# Patient Record
Sex: MALE | Race: WHITE | NOT HISPANIC OR LATINO | Employment: OTHER | URBAN - METROPOLITAN AREA
[De-identification: names, ages, dates, MRNs, and addresses within clinical notes are randomized per-mention and may not be internally consistent; named-entity substitution may affect disease eponyms.]

---

## 2017-03-07 ENCOUNTER — GENERIC CONVERSION - ENCOUNTER (OUTPATIENT)
Dept: OTHER | Facility: OTHER | Age: 82
End: 2017-03-07

## 2017-03-07 ENCOUNTER — ALLSCRIPTS OFFICE VISIT (OUTPATIENT)
Dept: OTHER | Facility: OTHER | Age: 82
End: 2017-03-07

## 2017-03-07 DIAGNOSIS — M54.50 LOW BACK PAIN: ICD-10-CM

## 2017-03-08 ENCOUNTER — HOSPITAL ENCOUNTER (OUTPATIENT)
Dept: RADIOLOGY | Facility: MEDICAL CENTER | Age: 82
Discharge: HOME | End: 2017-03-08
Payer: MEDICARE

## 2017-03-08 ENCOUNTER — TRANSCRIBE ORDERS (OUTPATIENT)
Dept: ADMINISTRATIVE | Facility: HOSPITAL | Age: 82
End: 2017-03-08

## 2017-03-08 DIAGNOSIS — M54.50 LOW BACK PAIN: ICD-10-CM

## 2017-03-08 PROCEDURE — 72110 X-RAY EXAM L-2 SPINE 4/>VWS: CPT

## 2017-03-25 ENCOUNTER — ALLSCRIPTS OFFICE VISIT (OUTPATIENT)
Dept: OTHER | Facility: OTHER | Age: 82
End: 2017-03-25

## 2017-03-30 ENCOUNTER — GENERIC CONVERSION - ENCOUNTER (OUTPATIENT)
Dept: OTHER | Facility: OTHER | Age: 82
End: 2017-03-30

## 2017-04-03 ENCOUNTER — GENERIC CONVERSION - ENCOUNTER (OUTPATIENT)
Dept: OTHER | Facility: OTHER | Age: 82
End: 2017-04-03

## 2017-04-11 ENCOUNTER — ALLSCRIPTS OFFICE VISIT (OUTPATIENT)
Dept: OTHER | Facility: OTHER | Age: 82
End: 2017-04-11

## 2017-05-18 ENCOUNTER — LAB REQUISITION (OUTPATIENT)
Dept: LAB | Facility: HOSPITAL | Age: 82
End: 2017-05-18
Payer: MEDICARE

## 2017-05-18 ENCOUNTER — GENERIC CONVERSION - ENCOUNTER (OUTPATIENT)
Dept: OTHER | Facility: OTHER | Age: 82
End: 2017-05-18

## 2017-05-18 ENCOUNTER — ALLSCRIPTS OFFICE VISIT (OUTPATIENT)
Dept: OTHER | Facility: OTHER | Age: 82
End: 2017-05-18

## 2017-05-18 DIAGNOSIS — C18.6 MALIGNANT NEOPLASM OF DESCENDING COLON (HCC): ICD-10-CM

## 2017-05-18 DIAGNOSIS — I10 ESSENTIAL (PRIMARY) HYPERTENSION: ICD-10-CM

## 2017-05-18 DIAGNOSIS — Z00.00 ENCOUNTER FOR GENERAL ADULT MEDICAL EXAMINATION WITHOUT ABNORMAL FINDINGS: ICD-10-CM

## 2017-05-18 DIAGNOSIS — R42 DIZZINESS AND GIDDINESS: ICD-10-CM

## 2017-05-18 LAB
ALBUMIN SERPL BCP-MCNC: 3.8 G/DL (ref 3.5–5)
ALP SERPL-CCNC: 40 U/L (ref 46–116)
ALT SERPL W P-5'-P-CCNC: 71 U/L (ref 12–78)
ANION GAP SERPL CALCULATED.3IONS-SCNC: 7 MMOL/L (ref 4–13)
ANISOCYTOSIS BLD QL SMEAR: PRESENT
AST SERPL W P-5'-P-CCNC: 39 U/L (ref 5–45)
BASOPHILS # BLD MANUAL: 0.11 THOUSAND/UL (ref 0–0.1)
BASOPHILS NFR MAR MANUAL: 2 % (ref 0–1)
BILIRUB SERPL-MCNC: 0.7 MG/DL (ref 0.2–1)
BUN SERPL-MCNC: 15 MG/DL (ref 5–25)
CALCIUM SERPL-MCNC: 8.6 MG/DL (ref 8.3–10.1)
CHLORIDE SERPL-SCNC: 103 MMOL/L (ref 100–108)
CO2 SERPL-SCNC: 27 MMOL/L (ref 21–32)
CREAT SERPL-MCNC: 1.01 MG/DL (ref 0.6–1.3)
EOSINOPHIL # BLD MANUAL: 0.05 THOUSAND/UL (ref 0–0.4)
EOSINOPHIL NFR BLD MANUAL: 1 % (ref 0–6)
ERYTHROCYTE [DISTWIDTH] IN BLOOD BY AUTOMATED COUNT: 13.2 % (ref 11.6–15.1)
GFR SERPL CREATININE-BSD FRML MDRD: >60 ML/MIN/1.73SQ M
GLUCOSE P FAST SERPL-MCNC: 208 MG/DL (ref 65–99)
HCT VFR BLD AUTO: 46.1 % (ref 36.5–49.3)
HGB BLD-MCNC: 15.9 G/DL (ref 12–17)
LYMPHOCYTES # BLD AUTO: 1.34 THOUSAND/UL (ref 0.6–4.47)
LYMPHOCYTES # BLD AUTO: 25 % (ref 14–44)
MCH RBC QN AUTO: 31.9 PG (ref 26.8–34.3)
MCHC RBC AUTO-ENTMCNC: 34.5 G/DL (ref 31.4–37.4)
MCV RBC AUTO: 92 FL (ref 82–98)
MONOCYTES # BLD AUTO: 0.64 THOUSAND/UL (ref 0–1.22)
MONOCYTES NFR BLD: 12 % (ref 4–12)
NEUTROPHILS # BLD MANUAL: 3.22 THOUSAND/UL (ref 1.85–7.62)
NEUTS SEG NFR BLD AUTO: 60 % (ref 43–75)
NRBC BLD AUTO-RTO: 0 /100 WBCS
PLATELET # BLD AUTO: 146 THOUSANDS/UL (ref 149–390)
PLATELET BLD QL SMEAR: ABNORMAL
PMV BLD AUTO: 12 FL (ref 8.9–12.7)
POTASSIUM SERPL-SCNC: 4.3 MMOL/L (ref 3.5–5.3)
PROT SERPL-MCNC: 7.1 G/DL (ref 6.4–8.2)
RBC # BLD AUTO: 4.99 MILLION/UL (ref 3.88–5.62)
RBC MORPH BLD: PRESENT
SODIUM SERPL-SCNC: 137 MMOL/L (ref 136–145)
TSH SERPL DL<=0.05 MIU/L-ACNC: 1.73 UIU/ML (ref 0.36–3.74)
WBC # BLD AUTO: 5.37 THOUSAND/UL (ref 4.31–10.16)

## 2017-05-18 PROCEDURE — 85027 COMPLETE CBC AUTOMATED: CPT | Performed by: FAMILY MEDICINE

## 2017-05-18 PROCEDURE — 80053 COMPREHEN METABOLIC PANEL: CPT | Performed by: FAMILY MEDICINE

## 2017-05-18 PROCEDURE — 85007 BL SMEAR W/DIFF WBC COUNT: CPT | Performed by: FAMILY MEDICINE

## 2017-05-18 PROCEDURE — 84443 ASSAY THYROID STIM HORMONE: CPT | Performed by: FAMILY MEDICINE

## 2017-06-29 ENCOUNTER — GENERIC CONVERSION - ENCOUNTER (OUTPATIENT)
Dept: OTHER | Facility: OTHER | Age: 82
End: 2017-06-29

## 2017-08-09 ENCOUNTER — ALLSCRIPTS OFFICE VISIT (OUTPATIENT)
Dept: OTHER | Facility: OTHER | Age: 82
End: 2017-08-09

## 2017-11-15 ENCOUNTER — ALLSCRIPTS OFFICE VISIT (OUTPATIENT)
Dept: OTHER | Facility: OTHER | Age: 82
End: 2017-11-15

## 2017-11-19 NOTE — PROGRESS NOTES
Assessment    1  Acute sinusitis, recurrence not specified, unspecified location (461 9) (J01 90)    Plan  Acute sinusitis, recurrence not specified, unspecified location    · Cefuroxime Axetil 500 MG Oral Tablet; TAKE 1 TABLET 2 TIMES DAILY AFTERMEALS    Discussion/Summary    Discussed OTC cold meds  Care, ER instructions given  F/U 5-7 days if not resolved  Possible side effects of new medications were reviewed with the patient/guardian today  The treatment plan was reviewed with the patient/guardian  The patient/guardian understands and agrees with the treatment plan      Chief Complaint    1  Cold Symptoms  Pt presents with sinus, congestion and blocked ears  History of Present Illness  HPI: PT  presents with 2-3 day history of runny nose, swollen glands, ears popping, nasal congestion; denies cough, ST, fever, chills, N/V/D  He has taken a leftover dose of Ceftin that was prescribed 5 years ago  Denies hx of asthma/allergies  Does not smoke  He has gotten a flu shot at [employer]  Review of Systems   Constitutional: no fever or chills, feels well, no tiredness, no recent weight loss or weight gain  ENT: as noted in HPI  Cardiovascular: no complaints of slow or fast heart rate, no chest pain, no palpitations, no leg claudication or lower extremity edema  Respiratory: as noted in HPI  Gastrointestinal: no complaints of abdominal pain, no constipation, no nausea or vomiting, no diarrhea or bloody stools  Genitourinary: no complaints of dysuria or incontinence, no hesitancy, no nocturia, no genital lesion, no inadequacy of penile erection  Active Problems  1  Acute sinusitis, recurrence not specified, unspecified location (461 9) (J01 90)   2  Benign essential hypertension (401 1) (I10)   3  Carcinoma of left colon (153 2) (C18 6)   4  Cataract of right eye (366 9) (H26 9)   5  Chronic low back pain (724 2,338 29) (M54 5,G89 29)   6  Dizziness (780 4) (R42)   7  Glaucoma (365 9) (H40 9)   8  Positive depression screening (796 4) (Z13 89)   9  Prediabetes (790 29) (R73 09)    Past Medical History  1  History of Acute lymphadenitis of upper limb (683) (L04 2)   2  Acute upper respiratory infection (465 9) (J06 9)   3  Acute upper respiratory infection (465 9) (J06 9)   4  Acute upper respiratory infection (465 9) (J06 9)   5  History of Allergic rhinitis due to pollen (477 0) (J30 1)   6  History of Asthma without status asthmaticus (493 90) (J45 909)   7  History of Diverticulosis (562 10) (K57 90)   8  History of acute bacterial sinusitis (V12 69) (Z87 09)   9  History of acute bacterial sinusitis (V12 69) (Z87 09)   10  History of acute pharyngitis (V12 69) (Z87 09)   11  History of acute sinusitis (V12 69) (Z87 09)   12  History of acute sinusitis (V12 69) (Z87 09)   13  History of allergic rhinitis (V12 69) (Z87 09)   14  History of colon cancer (V10 05) (Z85 038)   15  History of fatigue (V13 89) (Z87 898)   16  History of Inguinal hernia (550 90) (K40 90)   17  History of Nocturia (788 43) (R35 1)   18  History of Plantar fasciitis (728 71) (M72 2)   19  History of Preoperative examination (V72 84) (Z01 818)   20  History of Shoulder pain, right (719 41) (M25 511)   21  History of Skin lesion (709 9) (L98 9)   22  History of Skin lesion (709 9) (L98 9)   23  History of Special screening examination for neoplasm of prostate (V76 44) (Z12 5)   24  History of Strain of neck muscle, initial encounter (847 0) (S16 1XXA)   25  History of Wound, open, arm, forearm (881 00) (S51 809A)    Family History  Mother    1  Family history of    2  Family history of Diabetes (250 00) (E11 9)   3  Family history of Heart disease (429 9) (I51 9)  Father    4  Family history of     Social History   · Denied: History of Alcohol use   · Caffeine use (V49 89) (F15 90)   · Former smoker (V15 82) (W64 379)   · No drug use  The social history was reviewed and is unchanged  Surgical History    1   History of Cataract Surgery   2  History of Left Hemicolectomy   3  History of Shoulder Surgery    Current Meds   1  Wilmer Contour Test In [employer]; USE 1 STRIP Daily; Therapy: 47DJX6149 to (Last Yeseniaxiomara Lugo)  Requested for: 21Oct2016 Ordered   2  Combigan 0 2-0 5 % Ophthalmic Solution; Therapy: 45EVW8996 to ()  Requested for: 22QED4206 Recorded   3  Fluticasone Propionate 50 MCG/ACT Nasal Suspension; USE 2 SPRAYS IN EACH NOSTRIL ONCE DAILY; Therapy: 85LJV8443 to (Last Rx:79Ylj1648)  Requested for: 51Lci1814 Ordered   4  Lisinopril-Hydrochlorothiazide 10-12 5 MG Oral Tablet; TAKE 1 TABLET DAILY; Therapy: 88DRI8822 to (Evaluate:44Rjd4274)  Requested for: 30IZV0608; Last Rx:42Grd8551 Ordered   5  Lumigan 0 01 % Ophthalmic Solution; 1 drop both eyes bid Recorded   6  Simbrinza 1-0 2 % Ophthalmic Suspension; Therapy: 41CHT5084 to Recorded    The medication list was reviewed and updated today  Allergies  1  No Known Drug Allergies    Vitals   Recorded: 45KWC0180 09:33AM Recorded: 90OEA7040 09:08AM   Temperature  97 6 F, Tympanic   Heart Rate  97   Respiration  16   Systolic 606 946   Diastolic 78 82   Height  5 ft 6 in   Weight  181 lb    BMI Calculated  29 21   BSA Calculated  1 92   O2 Saturation  99   Pain Scale  0       Physical Exam   Constitutional  General appearance: No acute distress, well appearing and well nourished  Ears, Nose, Mouth, and Throat  External inspection of ears and nose: Normal    Otoscopic examination: Abnormal  -- Bilateral dull TMs  Nasal mucosa, septum, and turbinates: Abnormal  -- B/L boggy turbinates  Oropharynx: Abnormal  -- PND and erythema; no exudates  Pulmonary  Respiratory effort: No increased work of breathing or signs of respiratory distress  Auscultation of lungs: Clear to auscultation, equal breath sounds bilaterally, no wheezes, no rales, no rhonci  Cardiovascular  Auscultation of heart: Normal rate and rhythm, normal S1 and S2, without murmurs  Lymphatic  Palpation of lymph nodes in neck: No lymphadenopathy  Psychiatric  Orientation to person, place and time: Normal    Mood and affect: Normal    Additional Exam:  Vital signs were reviewed; neck supple  Future Appointments    Date/Time Provider Specialty Site   12/01/2017 10:00 AM CAITY Clark  Hematology Oncology [facility]       Signatures   Electronically signed by :  Priya Jean, [facility]; Nov 15 2017 10:27AM EST                       (Author)    Electronically signed by : Osito Katz DO; Nov 17 2017 10:18PM EST                       (Co-author)

## 2017-11-30 ENCOUNTER — GENERIC CONVERSION - ENCOUNTER (OUTPATIENT)
Dept: OTHER | Facility: OTHER | Age: 82
End: 2017-11-30

## 2017-12-01 ENCOUNTER — GENERIC CONVERSION - ENCOUNTER (OUTPATIENT)
Dept: OTHER | Facility: OTHER | Age: 82
End: 2017-12-01

## 2017-12-04 ENCOUNTER — GENERIC CONVERSION - ENCOUNTER (OUTPATIENT)
Dept: OTHER | Facility: OTHER | Age: 82
End: 2017-12-04

## 2018-01-13 VITALS
DIASTOLIC BLOOD PRESSURE: 92 MMHG | TEMPERATURE: 95.8 F | RESPIRATION RATE: 17 BRPM | WEIGHT: 181.56 LBS | OXYGEN SATURATION: 96 % | HEIGHT: 66 IN | SYSTOLIC BLOOD PRESSURE: 162 MMHG | BODY MASS INDEX: 29.18 KG/M2 | HEART RATE: 80 BPM

## 2018-01-13 VITALS
BODY MASS INDEX: 28.96 KG/M2 | RESPIRATION RATE: 17 BRPM | HEART RATE: 114 BPM | DIASTOLIC BLOOD PRESSURE: 80 MMHG | SYSTOLIC BLOOD PRESSURE: 142 MMHG | WEIGHT: 180.19 LBS | TEMPERATURE: 98.9 F | OXYGEN SATURATION: 96 % | HEIGHT: 66 IN

## 2018-01-14 VITALS
TEMPERATURE: 97.4 F | OXYGEN SATURATION: 98 % | SYSTOLIC BLOOD PRESSURE: 132 MMHG | HEART RATE: 112 BPM | RESPIRATION RATE: 17 BRPM | DIASTOLIC BLOOD PRESSURE: 86 MMHG | HEIGHT: 66 IN | BODY MASS INDEX: 29.1 KG/M2 | WEIGHT: 181.06 LBS

## 2018-01-14 VITALS
SYSTOLIC BLOOD PRESSURE: 158 MMHG | DIASTOLIC BLOOD PRESSURE: 88 MMHG | HEART RATE: 89 BPM | RESPIRATION RATE: 17 BRPM | OXYGEN SATURATION: 95 % | WEIGHT: 176 LBS | HEIGHT: 66 IN | BODY MASS INDEX: 28.28 KG/M2 | TEMPERATURE: 96.8 F

## 2018-01-14 VITALS
WEIGHT: 181 LBS | HEART RATE: 97 BPM | RESPIRATION RATE: 16 BRPM | HEIGHT: 66 IN | DIASTOLIC BLOOD PRESSURE: 78 MMHG | SYSTOLIC BLOOD PRESSURE: 122 MMHG | BODY MASS INDEX: 29.09 KG/M2 | TEMPERATURE: 97.6 F | OXYGEN SATURATION: 99 %

## 2018-01-14 NOTE — RESULT NOTES
Verified Results  * XR SPINE LUMBAR MINIMUM 4 VIEWS NON INJURY 17QSV2861 08:27AM Shirley Layne Order Number: [id number]     Test Name Result Flag Reference   XR SPINE LUMBAR MINIMUM 4 VIEWS (Report)     LUMBAR SPINE     INDICATION: Intermittent back pain for several years  Low back pain  COMPARISON: None     VIEWS: AP, lateral, bilateral oblique and coned down projections     IMAGES: 5     FINDINGS:     Osseous structures demineralized  Bilateral pars interarticularis defects L5  Grade 3 anterolisthesis L5 on S1  Loss in the axial heights of the T12 and L1 vertebral bodies along their superior endplates, compatible with Schmorl's node formation or compression fractures of unknown age  Old healed fractures of the left 9th and 10th ribs  Disc space narrowing throughout the lumbar spine, most pronounced L5-S1  Diffuse facet hypertrophic changes throughout the lumbar spine, most pronounced L4-L5 and L5-S1  Mild scoliotic curvature, concave left  Visualized soft tissues appear unremarkable  IMPRESSION:   1  Advanced degenerative changes lumbar spine, most pronounced L5-S1    2  Bilateral pars defects L5 with grade 3 anterolisthesis L5 on S1    3  Loss in the axial height of the T12 and L1 vertebral bodies along their superior endplates, representing either degenerative change/Schmorl's node formation versus age-indeterminate compression fracture  4  Old healed fractures of the left 9th and 10th ribs         ##sigslh##sigslh       Workstation performed: [id number]     Signed by:   Azra Vera DO   3/9/17

## 2018-01-15 VITALS
WEIGHT: 183.56 LBS | TEMPERATURE: 96.9 F | DIASTOLIC BLOOD PRESSURE: 90 MMHG | HEIGHT: 66 IN | SYSTOLIC BLOOD PRESSURE: 138 MMHG | RESPIRATION RATE: 17 BRPM | BODY MASS INDEX: 29.5 KG/M2 | OXYGEN SATURATION: 97 % | HEART RATE: 81 BPM

## 2018-01-15 NOTE — RESULT NOTES
Verified Results  (1) CBC/PLT/DIFF 57LKQ9260 11:17AM [facility] Order Number: [id number]     Test Name Result Flag Reference   WBC COUNT 5 37 Thousand/uL  4 31-10 16   RBC COUNT 4 99 Million/uL  3 88-5 62   HEMOGLOBIN 15 9 g/dL  12 0-17 0   HEMATOCRIT 46 1 %  36 5-49 3   MCV 92 fL  82-98   MCH 31 9 pg  26 8-34 3   MCHC 34 5 g/dL  31 4-37 4   RDW 13 2 %  11 6-15 1   MPV 12 0 fL  8 9-12 7   PLATELET COUNT 801 Thousands/uL L 149-390   nRBC AUTOMATED 0 /100 WBCs     This is an appended report  These results have been appended to a previously verified report  NEUTROPHILS - REL 60 %  43-75   LYMPHOCYTES - REL 25 %  14-44   MONOCYTES - REL 12 %  4-12   EOSINOPHILS - REL 1 %  0-6   BASOPHILS - REL 2 % H 0-1   NEUTROPHILS ABS 3 22 Thousand/uL  1 85-7 62   LYMPHOTCYTES ABS 1 34 Thousand/uL  0 60-4 47   MONOCYTES ABS 0 64 Thousand/uL  0 00-1 22   EOSINOPHILS ABS 0 05 Thousand/uL  0 00-0 40   BASOPHILS ABS 0 11 Thousand/uL H 0 00-0 10   TOTAL COUNTED      RBC MORPHOLOGY Present     Anisocytosis Present     PLT ESTIMATE Decreased A Adequate     (1) COMPREHENSIVE METABOLIC PANEL 65FHW7159 34:53ND [facility] Order Number: [id number]     Test Name Result Flag Reference   SODIUM 137 mmol/L  136-145   POTASSIUM 4 3 mmol/L  3 5-5 3   CHLORIDE 103 mmol/L  100-108   CARBON DIOXIDE 27 mmol/L  21-32   ANION GAP (CALC) 7 mmol/L  4-13   BLOOD UREA NITROGEN 15 mg/dL  5-25   CREATININE 1 01 mg/dL  0 60-1 30   Standardized to IDMS reference method   CALCIUM 8 6 mg/dL  8 3-10 1   BILI, TOTAL 0 70 mg/dL  0 20-1 00   ALK PHOSPHATAS 40 U/L L    ALT (SGPT) 71 U/L  12-78   AST(SGOT) 39 U/L  5-45   ALBUMIN 3 8 g/dL  3 5-5 0   TOTAL PROTEIN 7 1 g/dL  6 4-8 2   eGFR Non-African American      >60 0 ml/min/1 73sq m   [employer] Disease Education Program recommendations are as follows:  GFR calculation is accurate only with a steady state creatinine  Chronic Kidney disease less than 60 ml/min/1 73 sq  meters  Kidney failure less than 15 ml/min/1 73 sq  meters  GLUCOSE FASTING 208 mg/dL H 65-99     (1) TSH WITH FT4 REFLEX 23WYB6702 11:17AM Zhanna Berger Order Number: [id number]     Test Name Result Flag Reference   TSH 1 730 uIU/mL  0 358-3 740   Patients undergoing fluorescein dye angiography may retain small amounts of fluorescein in the body for 48-72 hours post procedure  Samples containing fluorescein can produce falsely depressed TSH values  If the patient had this procedure,a specimen should be resubmitted post fluorescein clearance

## 2018-01-24 VITALS
DIASTOLIC BLOOD PRESSURE: 104 MMHG | BODY MASS INDEX: 29.47 KG/M2 | RESPIRATION RATE: 16 BRPM | HEIGHT: 66 IN | WEIGHT: 183.38 LBS | HEART RATE: 95 BPM | OXYGEN SATURATION: 95 % | TEMPERATURE: 97.4 F | SYSTOLIC BLOOD PRESSURE: 172 MMHG

## 2018-01-24 VITALS
RESPIRATION RATE: 16 BRPM | SYSTOLIC BLOOD PRESSURE: 122 MMHG | TEMPERATURE: 99.4 F | DIASTOLIC BLOOD PRESSURE: 74 MMHG | HEART RATE: 113 BPM | BODY MASS INDEX: 29.41 KG/M2 | OXYGEN SATURATION: 95 % | WEIGHT: 183 LBS | HEIGHT: 66 IN

## 2018-03-05 ENCOUNTER — TELEPHONE (OUTPATIENT)
Dept: FAMILY MEDICINE CLINIC | Facility: CLINIC | Age: 83
End: 2018-03-05

## 2018-03-05 DIAGNOSIS — R73.03 PREDIABETES: Primary | ICD-10-CM

## 2018-03-05 PROBLEM — R97.0 ELEVATED CEA: Status: ACTIVE | Noted: 2017-12-01

## 2018-03-05 PROBLEM — G89.29 CHRONIC LOW BACK PAIN: Status: ACTIVE | Noted: 2017-03-07

## 2018-03-05 PROBLEM — R74.01 ELEVATED TRANSAMINASE LEVEL: Status: ACTIVE | Noted: 2017-12-01

## 2018-03-05 PROBLEM — Z13.31 POSITIVE DEPRESSION SCREENING: Status: ACTIVE | Noted: 2017-08-09

## 2018-03-05 PROBLEM — R42 DIZZINESS: Status: ACTIVE | Noted: 2017-05-18

## 2018-03-05 PROBLEM — M54.50 CHRONIC LOW BACK PAIN: Status: ACTIVE | Noted: 2017-03-07

## 2018-03-05 RX ORDER — LANCETS 30 GAUGE
EACH MISCELLANEOUS
Qty: 100 EACH | Refills: 3 | Status: SHIPPED | OUTPATIENT
Start: 2018-03-05

## 2018-03-05 NOTE — TELEPHONE ENCOUNTER
Patient came to office requesting lancet to test blood sugar, please sent to  [address] [place] [address] in [address] [place] [address], thank you

## 2018-03-13 DIAGNOSIS — R73.09 ABNORMAL GLUCOSE: Primary | ICD-10-CM

## 2018-05-28 DIAGNOSIS — R74.02 NONSPECIFIC ELEVATION OF LEVELS OF TRANSAMINASE AND LACTIC ACID DEHYDROGENASE (LDH): ICD-10-CM

## 2018-05-28 DIAGNOSIS — C18.6 MALIGNANT NEOPLASM OF DESCENDING COLON (HCC): ICD-10-CM

## 2018-05-28 DIAGNOSIS — R74.01 NONSPECIFIC ELEVATION OF LEVELS OF TRANSAMINASE AND LACTIC ACID DEHYDROGENASE (LDH): ICD-10-CM

## 2018-05-28 DIAGNOSIS — R97.0 ELEVATED CARCINOEMBRYONIC ANTIGEN: ICD-10-CM

## 2018-08-10 ENCOUNTER — LAB REQUISITION (OUTPATIENT)
Dept: LAB | Facility: HOSPITAL | Age: 83
End: 2018-08-10
Payer: MEDICARE

## 2018-08-10 DIAGNOSIS — D49.2 NEOPLASM OF UNSPECIFIED BEHAVIOR OF BONE, SOFT TISSUE, AND SKIN: ICD-10-CM

## 2018-08-10 PROCEDURE — 88305 TISSUE EXAM BY PATHOLOGIST: CPT | Performed by: PATHOLOGY

## 2018-08-21 PROCEDURE — 88305 TISSUE EXAM BY PATHOLOGIST: CPT | Performed by: PATHOLOGY

## 2018-08-22 ENCOUNTER — LAB REQUISITION (OUTPATIENT)
Dept: LAB | Facility: HOSPITAL | Age: 83
End: 2018-08-22
Payer: MEDICARE

## 2018-08-22 DIAGNOSIS — D49.2 NEOPLASM OF UNSPECIFIED BEHAVIOR OF BONE, SOFT TISSUE, AND SKIN: ICD-10-CM

## 2018-08-31 PROCEDURE — 88305 TISSUE EXAM BY PATHOLOGIST: CPT | Performed by: PATHOLOGY

## 2018-09-04 ENCOUNTER — LAB REQUISITION (OUTPATIENT)
Dept: LAB | Facility: HOSPITAL | Age: 83
End: 2018-09-04
Payer: MEDICARE

## 2018-09-04 DIAGNOSIS — D49.2 NEOPLASM OF UNSPECIFIED BEHAVIOR OF BONE, SOFT TISSUE, AND SKIN: ICD-10-CM

## 2018-11-26 DIAGNOSIS — R74.02 NONSPECIFIC ELEVATION OF LEVELS OF TRANSAMINASE AND LACTIC ACID DEHYDROGENASE (LDH): ICD-10-CM

## 2018-11-26 DIAGNOSIS — R97.0 ELEVATED CARCINOEMBRYONIC ANTIGEN: ICD-10-CM

## 2018-11-26 DIAGNOSIS — R74.01 NONSPECIFIC ELEVATION OF LEVELS OF TRANSAMINASE AND LACTIC ACID DEHYDROGENASE (LDH): ICD-10-CM

## 2018-11-26 DIAGNOSIS — C18.6 MALIGNANT NEOPLASM OF DESCENDING COLON (HCC): ICD-10-CM

## 2018-11-28 DIAGNOSIS — C18.6 CARCINOMA OF LEFT COLON (HCC): Primary | ICD-10-CM

## 2018-12-03 DIAGNOSIS — C18.6 CARCINOMA OF LEFT COLON (HCC): Primary | ICD-10-CM

## 2018-12-14 ENCOUNTER — OFFICE VISIT (OUTPATIENT)
Dept: HEMATOLOGY ONCOLOGY | Facility: CLINIC | Age: 83
End: 2018-12-14
Payer: MEDICARE

## 2018-12-14 VITALS
SYSTOLIC BLOOD PRESSURE: 130 MMHG | TEMPERATURE: 96.7 F | WEIGHT: 176 LBS | RESPIRATION RATE: 16 BRPM | DIASTOLIC BLOOD PRESSURE: 80 MMHG | BODY MASS INDEX: 28.28 KG/M2 | HEIGHT: 66 IN | OXYGEN SATURATION: 97 % | HEART RATE: 103 BPM

## 2018-12-14 DIAGNOSIS — C18.6 CARCINOMA OF LEFT COLON (HCC): Primary | ICD-10-CM

## 2018-12-14 PROCEDURE — 99214 OFFICE O/P EST MOD 30 MIN: CPT | Performed by: INTERNAL MEDICINE

## 2018-12-14 RX ORDER — BRIMONIDINE TARTRATE, TIMOLOL MALEATE 2; 5 MG/ML; MG/ML
SOLUTION/ DROPS OPHTHALMIC
COMMUNITY
Start: 2010-10-15 | End: 2020-05-13 | Stop reason: ALTCHOICE

## 2018-12-14 NOTE — PATIENT INSTRUCTIONS
The patient is aware to seek medical attention for abdominal pain, change in bowel habits, excessive fatigue, or if other new problems arise

## 2018-12-14 NOTE — PROGRESS NOTES
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, status post left hemicolectomy, T2N1 stage C1, eight courses of Xeloda from February 2006 to July 2006  Colonoscopy on April 15, 2015: There is a small polyp 23 cm below the anastomosis removed by forceps, three-year follow-up is recommended  Review of systems:      Constitutional:  He has been feeling well, he denies chills or sweats  HEENT:  He denies nose bleeds  He denies oral cavity or throat soreness  Cardiovascular:  He denies chest pain, there has been no edema  Respiratory:  He denies cough or dyspnea  GI:  His appetite has been good  No abdominal pain  Bowel habits have been formed and regular  :  He denies urinary frequency  Musculoskeletal:  He denies joint pain or back pain  Skin:  He denies rash  Neurologic:  He denies headache or paresthesias  He denies difficulty walking  Psychiatric:  He denies emotional problems  Hematologic:  He denies easy bruising  Physical Examination:    Blood pressure 130/80, pulse 103, temperature (!) 96 7 °F (35 9 °C), resp  rate 16, height 5' 6" (1 676 m), weight 79 8 kg (176 lb), SpO2 97 %  Body surface area is 1 89 meters squared  He appears well  EOMI  No hearing deficit  Orophaynx clear  No lymphadenopathy of the neck  No axillary lymphadenopathy   Lungs are clear bilaterally  Heart has regular rate and rhythm  No hepatic or splenic enlargement  No inguinal lymphadenopathy  No lower extremity edema  No ecchymoses    There are multiple keratoses and hemangiomata of the trunk and upper extremities and to lesser extent the lower extremities, none with suspicious appearance  Normal gait and station  Neurological is grossly intact  Oriented x3, normal mood and affect  ECOG 0     Laboratory:    From December 3, 2018:  WBC 6 0, hemoglobin 14 7, platelets 214, creatinine 1 17, alk-phos 45, bili 0 5, AST 31, ALT 48, CEA 7 0 (<7 0)  Contrast enhanced CT of the chest, abdomen pelvis from December 6, 2017:  The 2 mm YANG nodule is unchanged compared to November 27, 2015, no hilar mediastinal lymphadenopathy, there is a < 1 cm left hepatic lobe lesion likely a small cyst, a 2 2 x 1 7 left adrenal likely an adrenal myelolipoma, no abdominal pelvic lymphadenopathy, a 3 2 x 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

## 2019-01-11 DIAGNOSIS — E11.9 TYPE 2 DIABETES MELLITUS WITHOUT COMPLICATION, WITHOUT LONG-TERM CURRENT USE OF INSULIN (HCC): Primary | ICD-10-CM

## 2019-01-14 ENCOUNTER — TELEPHONE (OUTPATIENT)
Dept: FAMILY MEDICINE CLINIC | Facility: CLINIC | Age: 84
End: 2019-01-14

## 2019-01-14 RX ORDER — FLUTICASONE PROPIONATE 50 MCG
2 SPRAY, SUSPENSION (ML) NASAL DAILY
COMMUNITY
Start: 2017-08-09 | End: 2021-12-21

## 2019-01-14 RX ORDER — LISINOPRIL AND HYDROCHLOROTHIAZIDE 12.5; 1 MG/1; MG/1
1 TABLET ORAL DAILY
COMMUNITY
Start: 2017-05-18 | End: 2019-01-31 | Stop reason: ALTCHOICE

## 2019-01-14 NOTE — TELEPHONE ENCOUNTER
Patient came to office requesting a refill on his test strip, pt also bring along a form that needs to be filled out from the pharmacy in order for him to get his test strips  I ask MARK middleton if she could sign on this form which she did but just gave pt a QTY of 100 strips because pt has not been seeing for over over 2 years for his chronics conditions  Can you please put blood work orders in for patient, he will be going to [facility]  FY; patient was advice that he will not get any further refills until he have blood work done and make a follow up appointment, patient refused to make a appointment and  Said " I don't care, if you don't want to give me my medication I will find a different doctor''

## 2019-01-15 NOTE — TELEPHONE ENCOUNTER
I called and Spoke to Norm Corado informed him I was calling from our office and was asked to call per Debra Banks informing we would hate to lose him as a patient  He is not able to prescribe medication for him with out monitoring his condition  Pt stated," I Already said I would schedule at my convenience"  I informed pt I would let you know he will call at his convenience to make that regular follow up

## 2019-01-15 NOTE — TELEPHONE ENCOUNTER
Call patient  Please tell him that we would hate to lose him at as a patient, but cannot prescribed medication for him without monitoring his condition

## 2019-01-30 ENCOUNTER — TELEPHONE (OUTPATIENT)
Dept: FAMILY MEDICINE CLINIC | Facility: CLINIC | Age: 84
End: 2019-01-30

## 2019-01-30 NOTE — TELEPHONE ENCOUNTER
Jason Hewitt, pt's blood work from 1/23/19 are in [place]  I requested an update and now it shows  Let me know if you need anything else

## 2019-01-31 ENCOUNTER — OFFICE VISIT (OUTPATIENT)
Dept: FAMILY MEDICINE CLINIC | Facility: CLINIC | Age: 84
End: 2019-01-31
Payer: MEDICARE

## 2019-01-31 VITALS
RESPIRATION RATE: 15 BRPM | WEIGHT: 179.3 LBS | HEIGHT: 65 IN | BODY MASS INDEX: 29.87 KG/M2 | DIASTOLIC BLOOD PRESSURE: 90 MMHG | SYSTOLIC BLOOD PRESSURE: 150 MMHG | OXYGEN SATURATION: 96 % | HEART RATE: 92 BPM | TEMPERATURE: 96.1 F

## 2019-01-31 DIAGNOSIS — C18.6 CARCINOMA OF LEFT COLON (HCC): ICD-10-CM

## 2019-01-31 DIAGNOSIS — Z00.00 MEDICARE ANNUAL WELLNESS VISIT, SUBSEQUENT: Primary | ICD-10-CM

## 2019-01-31 DIAGNOSIS — E11.9 TYPE 2 DIABETES MELLITUS WITHOUT COMPLICATION, WITHOUT LONG-TERM CURRENT USE OF INSULIN (HCC): ICD-10-CM

## 2019-01-31 DIAGNOSIS — I10 BENIGN ESSENTIAL HYPERTENSION: ICD-10-CM

## 2019-01-31 LAB — HBA1C MFR BLD HPLC: 7.2 %

## 2019-01-31 PROCEDURE — 99214 OFFICE O/P EST MOD 30 MIN: CPT | Performed by: FAMILY MEDICINE

## 2019-01-31 PROCEDURE — G0439 PPPS, SUBSEQ VISIT: HCPCS | Performed by: FAMILY MEDICINE

## 2019-01-31 NOTE — PROGRESS NOTES
[facility]      NAME: Sultana Corral  AGE: 81 y o  SEX: male  : 1935   MRN: [id number]    DATE: 2019  TIME: 12:39 PM    Assessment and Plan     Problem List Items Addressed This Visit        Digestive    Carcinoma of left colon ([facility] [address] )     Stable  CEA level slightly decreased from 1 year ago  Endocrine    Type 2 diabetes mellitus without complication, without long-term current use of insulin ([facility] [address] )     Lab Results   Component Value Date    HGBA1C 7 2 2019       No results for input(s): POCGLU in the last 72 hours  Blood Sugar Average: Last 72 hrs:   hemoglobin A1c up to 7 2  Patient declines medication  Feels that if he more regularly checks his sugar he will also adhere to a better diet  Cardiovascular and Mediastinum    Benign essential hypertension     Currently controlled on no medication  Other Visit Diagnoses     Medicare annual wellness visit, subsequent    -  Primary    Questionnaire reviewed  Immunizations up-to-date  Age-related screenings reviewed  Patient declined to discuss advance directive              Return to office in:  6 months    Chief Complaint     Chief Complaint   Patient presents with   [facility] Wellness Visit     pt here for his AWV and to review labs       History of Present Illness     Patient presents for routine follow-up of chronic medical problems  Past medical history consistent for glaucoma, elevated fasting glucose and hypertension  He is on drops for his eyes but he is on no medication for his blood sugar or his blood pressure  He has no complaints at this time states that he feels well  He is also here to review recent fasting blood work          The following portions of the patient's history were reviewed and updated as appropriate: allergies, current medications, past family history, past medical history, past social history, past surgical history and problem list     Review of Systems Review of Systems   Constitutional: Negative  Respiratory: Negative  Cardiovascular: Negative  Gastrointestinal: Negative  Genitourinary: Negative  Musculoskeletal: Negative  Psychiatric/Behavioral: Negative  Active Problem List     Patient Active Problem List   Diagnosis    Benign essential hypertension    Carcinoma of left colon ([address] )    Cataract of right eye    Chronic low back pain    Dizziness    Elevated CEA    Elevated transaminase level    Glaucoma    Positive depression screening    Abnormal glucose    Type 2 diabetes mellitus without complication, without long-term current use of insulin (HCC)       Objective   /90 (BP Location: Right arm, Patient Position: Sitting, Cuff Size: Adult)   Pulse 92   Temp (!) 96 1 °F (35 6 °C) (Tympanic)   Resp 15   Ht 5' 5" (1 651 m)   Wt 81 3 kg (179 lb 4 8 oz)   SpO2 96%   BMI 29 84 kg/m²     Physical Exam   Constitutional: He is oriented to person, place, and time  He appears well-developed and well-nourished  No distress  HENT:   Head: Normocephalic and atraumatic  Eyes: Pupils are equal, round, and reactive to light  Conjunctivae are normal  Right eye exhibits no discharge  Neck: Normal range of motion  No thyromegaly present  Cardiovascular: Normal rate and regular rhythm  Pulmonary/Chest: Effort normal and breath sounds normal  No respiratory distress  Lymphadenopathy:     He has no cervical adenopathy  Neurological: He is alert and oriented to person, place, and time  Skin: Skin is warm and dry  He is not diaphoretic  Psychiatric: He has a normal mood and affect  His behavior is normal  Judgment and thought content normal    Nursing note and vitals reviewed          Current Medications     Current Outpatient Prescriptions:     bimatoprost (LUMIGAN) 0 01 % ophthalmic drops, Apply 1 drop to eye, Disp: , Rfl:     brimonidine-timolol (COMBIGAN) 0 2-0 5 %, Apply to eye, Disp: , Rfl:    Brinzolamide-Brimonidine (SIMBRINZA) 1-0 2 % SUSP, Apply to eye, Disp: , Rfl:     fluticasone (FLONASE) 50 mcg/act nasal spray, 2 sprays into each nostril daily, Disp: , Rfl:     glucose blood (DAFNE CONTOUR TEST) test strip, Test Once Daily, Disp: 100 each, Rfl: 1    Lancets MISC, To test BS once daily  , Disp: 100 each, Rfl: 3    Health Maintenance     Health Maintenance   Topic Date Due    Medicare Annual Wellness Visit (AWV)  1935    Diabetic Foot Exam  03/26/1946    URINE MICROALBUMIN  03/26/1945    DTaP,Tdap,and Td Vaccines (1 - Tdap) 07/03/2015    Pneumococcal PPSV23/PCV13 65+ Years / High and Highest Risk (2 of 2 - PPSV23) 09/15/2015    DM Eye Exam  06/29/2018    HEMOGLOBIN A1C  07/23/2019    Fall Risk  01/31/2020    Depression Screening PHQ  01/31/2020    CRC Screening: Colonoscopy  01/31/2022    INFLUENZA VACCINE  Completed     Immunization History   Administered Date(s) Administered    Influenza 09/09/2014, 10/02/2015, 10/04/2016, 11/01/2017, 10/05/2018    Influenza Quadrivalent, 6-35 Months IM 11/01/2017    Influenza Split High Dose Preservative Free IM 09/09/2014, 10/02/2015, 10/04/2016    Pneumococcal Conjugate 13-Valent 07/21/2015    TD (adult) Preservative Free 07/02/2015    Zoster 01/05/2013    Zoster Vaccine Recombinant 05/20/2018       Bob Boyd DO  [facility]

## 2019-01-31 NOTE — TELEPHONE ENCOUNTER
Thank you Hillary Paredes! He has his appointment today and just wanted to make sure it was here to review

## 2019-01-31 NOTE — PROGRESS NOTES
Assessment and Plan:  Problem List Items Addressed This Visit     None      Visit Diagnoses     Medicare annual wellness visit, subsequent    -  Primary    Questionnaire reviewed  Immunizations up-to-date  Age-related screenings reviewed  Patient declined to discuss advance directive    Type 2 diabetes mellitus without complication, without long-term current use of insulin [facility])            Health Maintenance Due   Topic Date Due    Medicare Annual Wellness Visit (AWV)  1935    Diabetic Foot Exam  03/26/1946    URINE MICROALBUMIN  03/26/1946    DTaP,Tdap,and Td Vaccines (1 - Tdap) 07/03/2015    Pneumococcal PPSV23/PCV13 65+ Years / High and Highest Risk (2 of 2 - PPSV23) 09/15/2015    DM Eye Exam  06/29/2018         HPI:  Patient Active Problem List   Diagnosis    Benign essential hypertension    Carcinoma of left colon [facility] )    Cataract of right eye    Chronic low back pain    Dizziness    Elevated CEA    Elevated transaminase level    Glaucoma    Positive depression screening    Abnormal glucose     Past Medical History:   Diagnosis Date    [facility]      History reviewed  No pertinent surgical history  Family History   Problem Relation Age of Onset    Family history unknown: Yes     History   Smoking Status    Former Smoker   Smokeless Tobacco    Never Used     Comment: quit 30 years ago (as of 12/2018)     History   Alcohol Use    Yes      History   Drug Use No         Current Outpatient Prescriptions   Medication Sig Dispense Refill    bimatoprost (LUMIGAN) 0 01 % ophthalmic drops Apply 1 drop to eye      brimonidine-timolol (COMBIGAN) 0 2-0 5 % Apply to eye      Brinzolamide-Brimonidine (SIMBRINZA) 1-0 2 % SUSP Apply to eye      fluticasone (FLONASE) 50 mcg/act nasal spray 2 sprays into each nostril daily      glucose blood (DAFNE CONTOUR TEST) test strip Test Once Daily 100 each 1    Lancets MISC To test BS once daily   100 each 3     No current facility-administered medications for this visit  No Known Allergies  Immunization History   Administered Date(s) Administered    Influenza 09/09/2014, 10/02/2015, 10/04/2016, 11/01/2017, 10/05/2018    Influenza Quadrivalent, 6-35 Months IM 11/01/2017    Influenza Split High Dose Preservative Free IM 09/09/2014, 10/02/2015, 10/04/2016    Pneumococcal Conjugate 13-Valent 07/21/2015    TD (adult) Preservative Free 07/02/2015    Zoster 01/05/2013    Zoster Vaccine Recombinant 05/20/2018       Patient Care Team:  Bradford Garces DO as PCP - General (Family Medicine)  MD Gigi Mckinnon MD    Medicare Screening Tests and Risk Assessments:  Jeni Brown is here for his Subsequent Wellness visit  Health Risk Assessment:  Patient rates overall health as good  Patient feels that their physical health rating is Same  Eyesight was rated as Same  Hearing was rated as Same  Patient feels that their emotional and mental health rating is Same  Pain experienced by patient in the last 7 days has been Some  Patient states that he has experienced no weight loss or gain in last 6 months  Emotional/Mental Health:  Patient has been feeling nervous/anxious  PHQ-9 Depression Screening:    Frequency of the following problems over the past two weeks:      1  Little interest or pleasure in doing things: 0 - not at all      2  Feeling down, depressed, or hopeless: 0 - not at all  PHQ-2 Score: 0          Broken Bones/Falls: Fall Risk Assessment:    In the past year, patient has experienced: History of falling in past year     Number of falls: 2 or more  Patient feels unsteady standing  Patient is not taking medication that can cause feelings of lightheadedness or tiredness  Patient often has no need to rush to the toilet  Chronic conditions that may contribute to falls diabetes  Bladder/Bowel:  Patient has not leaked urine accidently in the last six months    Patient reports no loss of bowel control  Immunizations:  Patient has had a flu vaccination within the last year  Patient has received a pneumonia shot  Patient has received a shingles shot  Home Safety:  Patient does not have trouble with stairs inside or outside of their home  Patient currently reports that there are no safety hazards present in home, no working smoke alarms, no working carbon monoxide detectors  Preventative Screenings:   prostate cancer screen performed, colon cancer screen completed, cholesterol screen completed, glaucoma eye exam completed,     Nutrition:  Current diet: Diabetic with servings of the following:    Medications:  Patient is currently taking over-the-counter supplements  Patient is able to manage medications  Lifestyle Choices:  Patient reports no tobacco use  Patient has smoked or used tobacco in the past   Patient has not stopped tobacco use  Patient reports alcohol use  Patient drives a vehicle  Patient wears seat belt  Activities of Daily Living:  Can get out of bed by his or her self, able to dress self, able to make own meals, able to do own shopping, able to bathe self, can do own laundry/housekeeping, can manage own money, pay bills and track expenses    Previous Hospitalizations:  No hospitalization or ED visit in past 12 months        Preventative Screening/Counseling:      Cardiovascular:      General: Screening Current          Diabetes:      General: Screening Current          Colorectal Cancer:      General: Patient Declines          Prostate Cancer:      General: Screening Not Indicated          Osteoporosis:      General: Screening Not Indicated          AAA:      General: Screening Not Indicated          Glaucoma:      General: Risks and Benefits Discussed          HIV:      General: Screening Not Indicated          Hepatitis C:      General: Screening Not Indicated        Advanced Directives:    Additional Comments: Patient declined to even answer questions on this subject  Immunizations:      Influenza: Influenza UTD This Year      Pneumococcal: Lifetime Vaccine Completed      Shingrix: Vaccine Status Unknown      Hepatitis B (Low risk patients): Series Not Indicated      Zostavax: Zostavax Vaccine UTD      TD: Td Vaccine UTD            No exam data present    Physical Exam:  Review of Systems   Gastrointestinal: Negative for bowel incontinence  Psychiatric/Behavioral: The patient is not nervous/anxious  Vitals:    01/31/19 1048   BP: 150/90   BP Location: Right arm   Patient Position: Sitting   Cuff Size: Adult   Pulse: 92   Resp: 15   Temp: (!) 96 1 °F (35 6 °C)   TempSrc: Tympanic   SpO2: 96%   Weight: 81 3 kg (179 lb 4 8 oz)   Height: 5' 5" (1 651 m)   Body mass index is 29 84 kg/m²      Physical Exam

## 2019-06-27 ENCOUNTER — TRANSITIONAL CARE MANAGEMENT (OUTPATIENT)
Dept: FAMILY MEDICINE CLINIC | Facility: CLINIC | Age: 84
End: 2019-06-27

## 2019-07-05 ENCOUNTER — OFFICE VISIT (OUTPATIENT)
Dept: FAMILY MEDICINE CLINIC | Facility: CLINIC | Age: 84
End: 2019-07-05
Payer: MEDICARE

## 2019-07-05 VITALS
HEART RATE: 94 BPM | HEIGHT: 64 IN | SYSTOLIC BLOOD PRESSURE: 120 MMHG | WEIGHT: 168 LBS | OXYGEN SATURATION: 97 % | BODY MASS INDEX: 28.68 KG/M2 | RESPIRATION RATE: 16 BRPM | TEMPERATURE: 96.5 F | DIASTOLIC BLOOD PRESSURE: 70 MMHG

## 2019-07-05 DIAGNOSIS — J01.90 ACUTE SINUSITIS, RECURRENCE NOT SPECIFIED, UNSPECIFIED LOCATION: Primary | ICD-10-CM

## 2019-07-05 PROCEDURE — 99213 OFFICE O/P EST LOW 20 MIN: CPT | Performed by: NURSE PRACTITIONER

## 2019-07-05 RX ORDER — AMOXICILLIN 875 MG/1
875 TABLET, COATED ORAL 2 TIMES DAILY
Qty: 14 TABLET | Refills: 0 | Status: SHIPPED | OUTPATIENT
Start: 2019-07-05 | End: 2019-07-12

## 2019-07-05 NOTE — PROGRESS NOTES
Pending sale to [facility]    ASSESSMENT AND PLAN     1  Acute sinusitis, recurrence not specified, unspecified location  Presents today with S/S consistent of a sinusitis  Assessment as below  Rx today for amoxicillin x7 days  Dose, use, symptom management reviewed  Advised re-evaluation should his symptoms change or worsen  Patient does have a follow-up scheduled with Dr Allison Jane for TCM status post CVA  No concerns today  Following with neuro and Cardiology  - amoxicillin (AMOXIL) 875 mg tablet; Take 1 tablet (875 mg total) by mouth 2 (two) times a day for 7 days  Dispense: 14 tablet; Refill: 0            SUBJECTIVE       Patient ID: Cesar Rolon is a 81 y o  male  Chief Complaint   Patient presents with    Sore Throat     x 2 weeks/congested,cough       HISTORY OF PRESENT ILLNESS    Presents today with sinus pain/pressure x5 days  Occasional productive cough  Blowing thick yellow secretions  Sore throat with postnasal drip  Denies fever  Denies GI distress  He had a few antibiotics left over from some other infection he had that he took and states that he did feel little better, but he ran out and has not taken any in the last 2 days  He is not sure what that antibiotic was  The following portions of the patient's history were reviewed and updated as appropriate: allergies, current medications, past family history, past medical history, past social history, past surgical history and problem list     REVIEW OF SYSTEMS  Review of Systems   Constitutional: Negative  HENT: Positive for congestion, postnasal drip, sinus pressure, sinus pain and sore throat  Negative for ear pain  Respiratory: Positive for cough  Negative for chest tightness, shortness of breath and wheezing  Cardiovascular: Negative  Neurological: Negative  Psychiatric/Behavioral: Negative          OBJECTIVE      VITAL SIGNS  /70 (BP Location: Left arm, Patient Position: Sitting, Cuff Size: Adult)   Pulse 94   Temp (!) 96 5 °F (35 8 °C) (Tympanic)   Resp 16   Ht 5' 4 37" (1 635 m)   Wt 76 2 kg (168 lb)   SpO2 97%   BMI 28 51 kg/m²     CURRENT MEDICATIONS    Current Outpatient Medications:     Brinzolamide-Brimonidine (SIMBRINZA) 1-0 2 % SUSP, Apply to eye, Disp: , Rfl:     glucose blood (DAFNE CONTOUR TEST) test strip, Test Once Daily, Disp: 100 each, Rfl: 1    Lancets MISC, To test BS once daily  , Disp: 100 each, Rfl: 3    amoxicillin (AMOXIL) 875 mg tablet, Take 1 tablet (875 mg total) by mouth 2 (two) times a day for 7 days, Disp: 14 tablet, Rfl: 0    brimonidine-timolol (COMBIGAN) 0 2-0 5 %, Apply to eye, Disp: , Rfl:     fluticasone (FLONASE) 50 mcg/act nasal spray, 2 sprays into each nostril daily, Disp: , Rfl:       PHYSICAL EXAMINATION   Physical Exam   Constitutional: He is oriented to person, place, and time  Vital signs are normal  He appears well-developed and well-nourished  HENT:   Head: Normocephalic  Right Ear: Hearing, tympanic membrane, external ear and ear canal normal    Left Ear: Hearing, tympanic membrane, external ear and ear canal normal    Nose: Mucosal edema present  Right sinus exhibits frontal sinus tenderness  Left sinus exhibits frontal sinus tenderness  Mouth/Throat: Mucous membranes are dry  Posterior oropharyngeal erythema present  No oropharyngeal exudate  Eyes: Conjunctivae are normal  Right eye exhibits no discharge  Left eye exhibits no discharge  Cardiovascular: Normal rate  An irregular rhythm present  Treating with Cardiology  Currently has loop recorder   Pulmonary/Chest: Effort normal and breath sounds normal  No respiratory distress  He has no decreased breath sounds  He has no wheezes  He has no rhonchi  He has no rales  Musculoskeletal: Normal range of motion  Lymphadenopathy:        Head (right side): No submental, no submandibular, no tonsillar, no preauricular, no posterior auricular and no occipital adenopathy present  Head (left side): No submental, no submandibular, no tonsillar, no preauricular, no posterior auricular and no occipital adenopathy present  He has no cervical adenopathy  Neurological: He is alert and oriented to person, place, and time  Skin: Skin is warm, dry and intact  Psychiatric: He has a normal mood and affect  Nursing note and vitals reviewed

## 2019-07-15 ENCOUNTER — OFFICE VISIT (OUTPATIENT)
Dept: FAMILY MEDICINE CLINIC | Facility: CLINIC | Age: 84
End: 2019-07-15
Payer: MEDICARE

## 2019-07-15 VITALS
WEIGHT: 167.2 LBS | OXYGEN SATURATION: 94 % | SYSTOLIC BLOOD PRESSURE: 114 MMHG | HEIGHT: 65 IN | DIASTOLIC BLOOD PRESSURE: 64 MMHG | HEART RATE: 69 BPM | BODY MASS INDEX: 27.86 KG/M2 | TEMPERATURE: 97.6 F

## 2019-07-15 DIAGNOSIS — E11.9 TYPE 2 DIABETES MELLITUS WITHOUT COMPLICATION, WITHOUT LONG-TERM CURRENT USE OF INSULIN (HCC): Primary | ICD-10-CM

## 2019-07-15 DIAGNOSIS — I63.512 ACUTE ISCHEMIC LEFT MCA STROKE (HCC): ICD-10-CM

## 2019-07-15 DIAGNOSIS — I10 BENIGN ESSENTIAL HYPERTENSION: ICD-10-CM

## 2019-07-15 DIAGNOSIS — E78.2 MIXED HYPERLIPIDEMIA: ICD-10-CM

## 2019-07-15 PROCEDURE — 99214 OFFICE O/P EST MOD 30 MIN: CPT | Performed by: FAMILY MEDICINE

## 2019-07-15 RX ORDER — ATORVASTATIN CALCIUM 40 MG/1
40 TABLET, FILM COATED ORAL
COMMUNITY
End: 2019-07-15 | Stop reason: SDUPTHER

## 2019-07-15 RX ORDER — METOPROLOL SUCCINATE 25 MG/1
25 TABLET, EXTENDED RELEASE ORAL DAILY
COMMUNITY
End: 2019-07-15 | Stop reason: SDUPTHER

## 2019-07-15 RX ORDER — ATORVASTATIN CALCIUM 40 MG/1
40 TABLET, FILM COATED ORAL
Qty: 30 TABLET | Refills: 3 | Status: SHIPPED | OUTPATIENT
Start: 2019-07-15 | End: 2021-10-26 | Stop reason: SDUPTHER

## 2019-07-15 RX ORDER — LISINOPRIL 5 MG/1
5 TABLET ORAL DAILY
COMMUNITY
End: 2019-07-15 | Stop reason: SDUPTHER

## 2019-07-15 RX ORDER — LISINOPRIL 5 MG/1
5 TABLET ORAL DAILY
Qty: 30 TABLET | Refills: 3 | Status: SHIPPED | OUTPATIENT
Start: 2019-07-15 | End: 2020-05-13 | Stop reason: ALTCHOICE

## 2019-07-15 RX ORDER — ASPIRIN 81 MG/1
81 TABLET ORAL DAILY
COMMUNITY
End: 2020-05-13 | Stop reason: ALTCHOICE

## 2019-07-15 RX ORDER — METOPROLOL SUCCINATE 25 MG/1
25 TABLET, EXTENDED RELEASE ORAL DAILY
Qty: 30 TABLET | Refills: 3 | Status: SHIPPED | OUTPATIENT
Start: 2019-07-15 | End: 2020-05-13 | Stop reason: DRUGHIGH

## 2019-07-15 NOTE — ASSESSMENT & PLAN NOTE
Minimal if any residual deficit aside from very slight speech disruption  Continue with current regimen and follow up with Cardiology regarding echocardiogram and depressed ejection fraction

## 2019-07-15 NOTE — ASSESSMENT & PLAN NOTE
Lab Results   Component Value Date    HGBA1C 7 2 01/23/2019       No results for input(s): POCGLU in the last 72 hours  Blood Sugar Average: Last 72 hrs:    Patient started on  Metformin during hospitalization  I encouraged him to continue with that medication
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
## 2019-09-27 DIAGNOSIS — E11.9 TYPE 2 DIABETES MELLITUS WITHOUT COMPLICATION, WITHOUT LONG-TERM CURRENT USE OF INSULIN (HCC): ICD-10-CM

## 2019-12-06 ENCOUNTER — OFFICE VISIT (OUTPATIENT)
Dept: HEMATOLOGY ONCOLOGY | Facility: CLINIC | Age: 84
End: 2019-12-06
Payer: MEDICARE

## 2019-12-06 VITALS
OXYGEN SATURATION: 97 % | SYSTOLIC BLOOD PRESSURE: 140 MMHG | DIASTOLIC BLOOD PRESSURE: 70 MMHG | RESPIRATION RATE: 16 BRPM | WEIGHT: 173 LBS | HEIGHT: 65 IN | BODY MASS INDEX: 28.82 KG/M2 | HEART RATE: 65 BPM | TEMPERATURE: 96.7 F

## 2019-12-06 DIAGNOSIS — C18.6 CARCINOMA OF LEFT COLON (HCC): Primary | ICD-10-CM

## 2019-12-06 PROCEDURE — 99213 OFFICE O/P EST LOW 20 MIN: CPT | Performed by: INTERNAL MEDICINE

## 2019-12-06 NOTE — PROGRESS NOTES
12/6/2019    Yaakovroger Nowaks    He was admitted to [facility] June 21-26, 2019 with cerebrovascular accident involving left middle cerebral artery distribution, he received treatment with tPA unsuccessfully and then mechanical thrombectomy with reestablishment of flow  A loop recorder was placed on June 26, 2019  Stefanie Bueno was done on June 26, 2019:  LVEF was 35%, moderate atheromatous disease in the descending aorta and aortic arch, no evidence of right to left shunt through a PFO  He was started on apixaban, currently 5 mg b i d  Cardiac catheterization was done on December 3, 2019: There is mild anterior lateral hypokinesis, LVEF 40-50% by visual estimate, mild LAD and severe RCA disease    At this time he is feeling well  There has been no residual neurological deficit  He denies nose bleeds or gingival bleeding or excessive bruising  Hematology/Oncology History: 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, status post left hemicolectomy, T2N1 stage C1, eight courses of Xeloda from February 2006 to July 2006       Colonoscopy on April 15, 2015: There is a small polyp 23 cm below the anastomosis removed by forceps, three-year follow-up is recommended  Review of systems:      Constitutional:  He has been feeling well, he denies chills or sweats  HEENT:  He denies nose bleeds  He denies oral cavity or throat soreness  Cardiovascular:  He denies chest pain, there has been no edema  Respiratory:  He denies cough or dyspnea  GI:  His appetite has been good  No abdominal pain  Bowel habits have been formed and regular  :  He denies urinary frequency  Musculoskeletal:  He denies joint pain or back pain  Skin:  He denies rash  Neurologic:  See HPI  He denies headache or paresthesias  He denies difficulty walking  Psychiatric:  He denies emotional problems      Hematologic:  He denies easy bruising  Physical Examination:    Blood pressure 140/70, pulse 65, temperature (!) 96 7 °F (35 9 °C), temperature source Tympanic, resp  rate 16, height 5' 5" (1 651 m), weight 78 5 kg (173 lb), SpO2 97 %  Body surface area is 1 86 meters squared  He appears well  EOMI  No hearing deficit  Orophaynx clear  No lymphadenopathy of the neck  No axillary lymphadenopathy   Lungs are clear bilaterally  Heart has regular rate and rhythm  No hepatic or splenic enlargement  No inguinal lymphadenopathy  No lower extremity edema  No ecchymoses  There are multiple keratoses and hemangiomata of the trunk and upper extremities and to lesser extent the lower extremities, none with suspicious appearance  Normal gait and station  Neurological is grossly intact  Oriented x3, normal mood and affect      ECOG 1    Laboratory:    From December 2, 2019:  CEA is 6 1 (< 7 0), creatinine 1 21, calcium 9 1, AST 31, ALT 46, alk-phos 38, bili 0 6, WBC 7 5 with ANC 3 2, hemoglobin 14 8, platelets 912    Assessment/Plan:    Bhavana Atkinson remains in clinical remission of adenocarcinoma left colon, T2N1, diagnosed in November 2005       The patient declines surveillance colonoscopy being concerned as to risk of bowel perforation  He is agreeable to fecal immunological test for colorectal cancer screening      He is aware to seek medical attention for abdominal pain, change in bowel habits, excessive fatigue, or if other new problems arise  Otherwise, I plan to see him again in one year

## 2020-05-12 DIAGNOSIS — E11.9 TYPE 2 DIABETES MELLITUS WITHOUT COMPLICATION, WITHOUT LONG-TERM CURRENT USE OF INSULIN (HCC): ICD-10-CM

## 2020-05-13 ENCOUNTER — OFFICE VISIT (OUTPATIENT)
Dept: FAMILY MEDICINE CLINIC | Facility: CLINIC | Age: 85
End: 2020-05-13
Payer: MEDICARE

## 2020-05-13 VITALS
WEIGHT: 170 LBS | HEART RATE: 97 BPM | SYSTOLIC BLOOD PRESSURE: 126 MMHG | BODY MASS INDEX: 28.29 KG/M2 | DIASTOLIC BLOOD PRESSURE: 82 MMHG | TEMPERATURE: 98 F | OXYGEN SATURATION: 95 %

## 2020-05-13 DIAGNOSIS — E78.2 MIXED HYPERLIPIDEMIA: ICD-10-CM

## 2020-05-13 DIAGNOSIS — C18.6 CARCINOMA OF LEFT COLON (HCC): ICD-10-CM

## 2020-05-13 DIAGNOSIS — I48.0 PAROXYSMAL ATRIAL FIBRILLATION (HCC): ICD-10-CM

## 2020-05-13 DIAGNOSIS — I25.10 CORONARY ARTERY DISEASE INVOLVING NATIVE CORONARY ARTERY OF NATIVE HEART WITHOUT ANGINA PECTORIS: ICD-10-CM

## 2020-05-13 DIAGNOSIS — I10 BENIGN ESSENTIAL HYPERTENSION: Primary | ICD-10-CM

## 2020-05-13 DIAGNOSIS — E11.9 TYPE 2 DIABETES MELLITUS WITHOUT COMPLICATION, WITHOUT LONG-TERM CURRENT USE OF INSULIN (HCC): ICD-10-CM

## 2020-05-13 PROBLEM — I69.30 CHRONIC ARTERIAL ISCHEMIC STROKE: Status: ACTIVE | Noted: 2020-01-29

## 2020-05-13 PROBLEM — N17.9 AKI (ACUTE KIDNEY INJURY) (HCC): Status: RESOLVED | Noted: 2020-04-09 | Resolved: 2020-05-13

## 2020-05-13 PROBLEM — I50.22 CHRONIC SYSTOLIC HEART FAILURE (HCC): Status: ACTIVE | Noted: 2019-07-16

## 2020-05-13 PROBLEM — Z95.818 STATUS POST PLACEMENT OF IMPLANTABLE LOOP RECORDER: Status: ACTIVE | Noted: 2019-07-16

## 2020-05-13 PROBLEM — Z86.73 CHRONIC ARTERIAL ISCHEMIC STROKE: Status: ACTIVE | Noted: 2020-01-29

## 2020-05-13 PROBLEM — N17.9 AKI (ACUTE KIDNEY INJURY) (HCC): Status: ACTIVE | Noted: 2020-04-09

## 2020-05-13 PROBLEM — E78.1 HYPERTRIGLYCERIDEMIA: Status: ACTIVE | Noted: 2019-07-15

## 2020-05-13 PROBLEM — R53.81 PHYSICAL DECONDITIONING: Status: ACTIVE | Noted: 2020-05-13

## 2020-05-13 PROBLEM — I63.512 ACUTE ISCHEMIC LEFT MCA STROKE (HCC): Status: RESOLVED | Noted: 2019-06-21 | Resolved: 2020-05-13

## 2020-05-13 PROCEDURE — 4040F PNEUMOC VAC/ADMIN/RCVD: CPT | Performed by: FAMILY MEDICINE

## 2020-05-13 PROCEDURE — 99214 OFFICE O/P EST MOD 30 MIN: CPT | Performed by: FAMILY MEDICINE

## 2020-05-13 PROCEDURE — 4010F ACE/ARB THERAPY RXD/TAKEN: CPT | Performed by: FAMILY MEDICINE

## 2020-05-13 PROCEDURE — 3079F DIAST BP 80-89 MM HG: CPT | Performed by: FAMILY MEDICINE

## 2020-05-13 PROCEDURE — 1036F TOBACCO NON-USER: CPT | Performed by: FAMILY MEDICINE

## 2020-05-13 PROCEDURE — 3074F SYST BP LT 130 MM HG: CPT | Performed by: FAMILY MEDICINE

## 2020-05-13 PROCEDURE — 1160F RVW MEDS BY RX/DR IN RCRD: CPT | Performed by: FAMILY MEDICINE

## 2020-05-13 PROCEDURE — 3066F NEPHROPATHY DOC TX: CPT | Performed by: FAMILY MEDICINE

## 2020-05-13 RX ORDER — APIXABAN 5 MG/1
5 TABLET, FILM COATED ORAL 2 TIMES DAILY
COMMUNITY
Start: 2020-04-22 | End: 2022-04-27 | Stop reason: SDUPTHER

## 2020-05-13 RX ORDER — CYCLOPENTOLATE HYDROCHLORIDE 10 MG/ML
SOLUTION/ DROPS OPHTHALMIC
COMMUNITY
Start: 2020-04-17 | End: 2021-12-21

## 2020-05-13 RX ORDER — NITROGLYCERIN 0.4 MG/1
0.4 TABLET SUBLINGUAL AS NEEDED
COMMUNITY
Start: 2019-12-03 | End: 2022-04-27

## 2020-05-13 RX ORDER — MIRTAZAPINE 15 MG/1
7.5 TABLET, FILM COATED ORAL DAILY
COMMUNITY
Start: 2020-05-12

## 2020-05-13 RX ORDER — ACETAZOLAMIDE 250 MG/1
500 TABLET ORAL 2 TIMES DAILY
COMMUNITY
Start: 2020-04-07 | End: 2020-05-13 | Stop reason: ALTCHOICE

## 2020-05-13 RX ORDER — METOPROLOL SUCCINATE 100 MG/1
100 TABLET, EXTENDED RELEASE ORAL DAILY
COMMUNITY
Start: 2020-04-22 | End: 2020-09-21 | Stop reason: SDUPTHER

## 2020-05-13 RX ORDER — AMLODIPINE BESYLATE 10 MG/1
10 TABLET ORAL DAILY
COMMUNITY
Start: 2020-05-01 | End: 2020-05-15 | Stop reason: ALTCHOICE

## 2020-05-13 RX ORDER — LISINOPRIL 10 MG/1
10 TABLET ORAL DAILY
COMMUNITY
Start: 2020-04-22 | End: 2022-04-27 | Stop reason: SDUPTHER

## 2020-05-13 RX ORDER — BRINZOLAMIDE 1 %
SUSPENSION, DROPS(FINAL DOSAGE FORM)(ML) OPHTHALMIC (EYE)
COMMUNITY
Start: 2020-04-07 | End: 2020-05-13 | Stop reason: ALTCHOICE

## 2020-05-26 DIAGNOSIS — E11.9 TYPE 2 DIABETES MELLITUS WITHOUT COMPLICATION, WITHOUT LONG-TERM CURRENT USE OF INSULIN (HCC): ICD-10-CM

## 2020-06-13 DIAGNOSIS — E11.9 TYPE 2 DIABETES MELLITUS WITHOUT COMPLICATION, WITHOUT LONG-TERM CURRENT USE OF INSULIN (HCC): ICD-10-CM

## 2020-09-21 ENCOUNTER — OFFICE VISIT (OUTPATIENT)
Dept: FAMILY MEDICINE CLINIC | Facility: CLINIC | Age: 85
End: 2020-09-21
Payer: MEDICARE

## 2020-09-21 VITALS
OXYGEN SATURATION: 97 % | SYSTOLIC BLOOD PRESSURE: 130 MMHG | DIASTOLIC BLOOD PRESSURE: 68 MMHG | TEMPERATURE: 97.6 F | WEIGHT: 167 LBS | HEIGHT: 65 IN | BODY MASS INDEX: 27.82 KG/M2 | HEART RATE: 87 BPM

## 2020-09-21 DIAGNOSIS — I10 BENIGN ESSENTIAL HYPERTENSION: ICD-10-CM

## 2020-09-21 DIAGNOSIS — I25.10 CORONARY ARTERY DISEASE INVOLVING NATIVE CORONARY ARTERY OF NATIVE HEART WITHOUT ANGINA PECTORIS: ICD-10-CM

## 2020-09-21 DIAGNOSIS — E11.9 TYPE 2 DIABETES MELLITUS WITHOUT COMPLICATION, WITHOUT LONG-TERM CURRENT USE OF INSULIN (HCC): ICD-10-CM

## 2020-09-21 DIAGNOSIS — I48.0 PAROXYSMAL ATRIAL FIBRILLATION (HCC): ICD-10-CM

## 2020-09-21 DIAGNOSIS — Z00.00 MEDICARE ANNUAL WELLNESS VISIT, SUBSEQUENT: Primary | ICD-10-CM

## 2020-09-21 DIAGNOSIS — E11.8 TYPE 2 DIABETES MELLITUS WITH COMPLICATION, WITHOUT LONG-TERM CURRENT USE OF INSULIN (HCC): ICD-10-CM

## 2020-09-21 DIAGNOSIS — C18.6 CARCINOMA OF LEFT COLON (HCC): ICD-10-CM

## 2020-09-21 PROCEDURE — G0438 PPPS, INITIAL VISIT: HCPCS | Performed by: FAMILY MEDICINE

## 2020-09-21 PROCEDURE — 99214 OFFICE O/P EST MOD 30 MIN: CPT | Performed by: FAMILY MEDICINE

## 2020-09-21 RX ORDER — METOPROLOL SUCCINATE 100 MG/1
100 TABLET, EXTENDED RELEASE ORAL DAILY
Qty: 90 TABLET | Refills: 1 | Status: SHIPPED | OUTPATIENT
Start: 2020-09-21 | End: 2021-02-12 | Stop reason: SDUPTHER

## 2020-09-21 NOTE — ASSESSMENT & PLAN NOTE
Lab Results   Component Value Date    HGBA1C 6 6 (H) 04/10/2020   Most recent hemoglobin A1c 6 6 in April  Due for recheck in near future

## 2020-09-21 NOTE — PROGRESS NOTES
[facility]      NAME: Nurys Osorio  AGE: 81 y o  SEX: male  : 1935   MRN: [id number]    DATE: 2020  TIME: 10:18 AM    Assessment and Plan     Problem List Items Addressed This Visit     Type 2 diabetes mellitus with complication, without long-term current use of insulin ([facility] )       Lab Results   Component Value Date    HGBA1C 6 6 (H) 04/10/2020   Most recent hemoglobin A1c 6 6 in April  Due for recheck in near future  Relevant Medications    metFORMIN (GLUCOPHAGE) 500 mg tablet    Paroxysmal atrial fibrillation (HCC)     Continue Eliquis  Relevant Medications    metoprolol succinate (TOPROL-XL) 100 mg 24 hr tablet    Coronary artery disease involving native coronary artery     Asymptomatic  Continue follow-up with Cardiology and risk factor management  Relevant Medications    metoprolol succinate (TOPROL-XL) 100 mg 24 hr tablet    Carcinoma of left colon ([facility] )     Continue routine surveillance and follow-up with Hematology-Oncology  Benign essential hypertension     Well controlled on lisinopril and metoprolol  Continue current dosages         Relevant Medications    metoprolol succinate (TOPROL-XL) 100 mg 24 hr tablet      Other Visit Diagnoses     Medicare annual wellness visit, subsequent    -  Primary     questionnaire reviewed  Immunization and screening history is updated  Advance directive not in place but counseling provided  Return to office in:  6 months    Chief Complaint     Chief Complaint   Patient presents with   [facility] Wellness Visit     Pt is here for a sub awv    Follow-up     Pt is here for a 4 month follow up  History of Present Illness     Patient presents for routine follow-up of chronic medical problems  He is being treated for type 2 diabetes, atrial fibrillation, hypertension, hyperlipidemia and colon cancer  He follows with cardiology for his atrial fibrillation and coronary disease    He sees hematology to follow-up on his colon cancer  He is on Eliquis for atrial fibrillation  He takes lisinopril for blood pressure along with metoprolol  He is on metformin for his diabetes atorvastatin for his lipids  He has no complaints today and feels well  He checks his sugars at home and they are almost always below 140  The following portions of the patient's history were reviewed and updated as appropriate: allergies, current medications, past family history, past medical history, past social history, past surgical history and problem list     Review of Systems   Review of Systems   Constitutional: Negative  Respiratory: Negative  Cardiovascular: Negative  Gastrointestinal: Negative  Genitourinary: Negative  Musculoskeletal: Negative  Psychiatric/Behavioral: Negative  Active Problem List     Patient Active Problem List   Diagnosis    Benign essential hypertension    Carcinoma of left colon (HCC)    Chronic low back pain    Elevated CEA    Elevated transaminase level    Glaucoma    Positive depression screening    Type 2 diabetes mellitus with complication, without long-term current use of insulin (HCC)    Hypertriglyceridemia    Chronic arterial ischemic stroke    Chronic systolic heart failure (HCC)    Coronary artery disease involving native coronary artery    Paroxysmal atrial fibrillation (HCC)    Physical deconditioning    Status post placement of implantable loop recorder       Objective   /68 (BP Location: Right arm, Patient Position: Sitting, Cuff Size: Adult)   Pulse 87   Temp 97 6 °F (36 4 °C) (Tympanic)   Ht 5' 5" (1 651 m)   Wt 75 8 kg (167 lb)   SpO2 97%   BMI 27 79 kg/m²     Physical Exam  Vitals signs and nursing note reviewed  Constitutional:       General: He is not in acute distress  Appearance: He is well-developed  He is not diaphoretic  HENT:      Head: Normocephalic and atraumatic     Eyes:      General:         Right eye: No discharge  Conjunctiva/sclera: Conjunctivae normal       Pupils: Pupils are equal, round, and reactive to light  Neck:      Musculoskeletal: Normal range of motion  Thyroid: No thyromegaly  Cardiovascular:      Rate and Rhythm: Normal rate and regular rhythm  Pulmonary:      Effort: Pulmonary effort is normal  No respiratory distress  Breath sounds: Normal breath sounds  Lymphadenopathy:      Cervical: No cervical adenopathy  Skin:     General: Skin is warm and dry  Neurological:      Mental Status: He is alert and oriented to person, place, and time  Psychiatric:         Behavior: Behavior normal          Thought Content: Thought content normal          Judgment: Judgment normal            Current Medications     Current Outpatient Medications:     atorvastatin (LIPITOR) 40 mg tablet, Take 1 tablet (40 mg total) by mouth daily at bedtime, Disp: 30 tablet, Rfl: 3    cyclopentolate (CYCLOGYL) 1 % ophthalmic solution, instill 1 drop into right eye twice a day, Disp: , Rfl:     ELIQUIS 5 MG, Take 5 mg by mouth 2 (two) times a day, Disp: , Rfl:     glucose blood (Wilmer Contour Test) test strip, Test Once Daily, Disp: 100 each, Rfl: 1    Lancets MISC, To test BS once daily  , Disp: 100 each, Rfl: 3    lisinopril (ZESTRIL) 10 mg tablet, Take 10 mg by mouth daily, Disp: , Rfl:     metFORMIN (GLUCOPHAGE) 500 mg tablet, Take 1 tablet (500 mg total) by mouth every 12 (twelve) hours, Disp: 180 tablet, Rfl: 1    metoprolol succinate (TOPROL-XL) 100 mg 24 hr tablet, Take 1 tablet (100 mg total) by mouth daily, Disp: 90 tablet, Rfl: 1    mirtazapine (REMERON) 15 mg tablet, Take 7 5 mg by mouth daily, Disp: , Rfl:     nitroglycerin (NITROSTAT) 0 4 mg SL tablet, Place 0 4 mg under the tongue as needed, Disp: , Rfl:     TOBRADEX ophthalmic ointment, 3 (three) times a day, Disp: , Rfl:     fluticasone (FLONASE) 50 mcg/act nasal spray, 2 sprays into each nostril daily, Disp: , Rfl: Health Maintenance     Health Maintenance   Topic Date Due    Colonoscopy Surveillance  1935    BMI: Followup Plan  03/26/1954    DTaP,Tdap,and Td Vaccines (1 - Tdap) 03/26/1957    Pneumococcal Vaccine: 65+ Years (2 of 2 - PPSV23) 07/21/2016    DM Eye Exam  06/29/2018    Medicare Annual Wellness Visit (AWV)  01/31/2020    Influenza Vaccine  07/01/2020    HEMOGLOBIN A1C  10/10/2020    Fall Risk  09/21/2021    Depression Screening PHQ  09/21/2021    BMI: Adult  09/21/2021    HIB Vaccine  Aged Out    Hepatitis B Vaccine  Aged Out    IPV Vaccine  Aged Out    Hepatitis A Vaccine  Aged Out    Meningococcal ACWY Vaccine  Aged Out    HPV Vaccine  Aged [employer] History   Administered Date(s) Administered    INFLUENZA 09/09/2014, 10/02/2015, 10/04/2016, 11/01/2017, 10/05/2018, 11/07/2019    Influenza Quadrivalent, 6-35 Months IM 11/01/2017    Influenza Split High Dose Preservative Free IM 09/09/2014, 10/02/2015, 10/04/2016    Pneumococcal 11/07/2019    Pneumococcal Conjugate 13-Valent 07/21/2015    TD (adult) Preservative Free 07/02/2015    Tetanus, adsorbed 07/02/2015    Zoster 01/05/2013    Zoster Vaccine Recombinant 05/20/2018    influenza, trivalent, adjuvanted 09/26/2019       Neda Lal DO  [facility]

## 2020-09-21 NOTE — PROGRESS NOTES
Assessment and Plan:     Problem List Items Addressed This Visit     Type 2 diabetes mellitus without complication, without long-term current use of insulin ([facility] )      Other Visit Diagnoses     Medicare annual wellness visit, subsequent    -  Primary     questionnaire reviewed  Immunization and screening history is updated  Advance directive not in place but counseling provided  BMI Counseling: Body mass index is 27 79 kg/m²  The BMI is above normal  Nutrition recommendations include decreasing portion sizes, encouraging healthy choices of fruits and vegetables, limiting drinks that contain sugar, moderation in carbohydrate intake and increasing intake of lean protein  Exercise recommendations include exercising 3-5 times per week  No pharmacotherapy was ordered  Preventive health issues were discussed with patient, and age appropriate screening tests were ordered as noted in patient's After Visit Summary  Personalized health advice and appropriate referrals for health education or preventive services given if needed, as noted in patient's After Visit Summary  History of Present Illness:     Patient presents for Welcome to Medicare visit       Patient Care Team:  Anthoney Essex, DO as PCP - General (Family Medicine)  MD Martina Oliveira MD     Review of Systems:     Review of Systems   Problem List:     Patient Active Problem List   Diagnosis    Benign essential hypertension    [facility]    Chronic low back pain    Elevated CEA    Elevated transaminase level    Glaucoma    Positive depression screening    Type 2 diabetes mellitus without complication, without long-term current use of insulin (HCC)    Hypertriglyceridemia    Chronic arterial ischemic stroke    Chronic systolic heart failure ([facility] )    Coronary artery disease involving native coronary artery    Paroxysmal atrial fibrillation [facility]    Physical deconditioning    Status post placement of implantable loop recorder      Past Medical and Surgical History:     Past Medical History:   Diagnosis Date    [facility] 6/21/2019    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 MRI small bleed CT 8-19 no bleed No neurology follow up LINQ 6-19 PAF seen 11-19   Last Assessment & Plan:  Good recovery CVA- recent LINQ trasmission showed PAF: Would like to start Eliquis but because had CNS bleed needs neurology evaluation make sure ok  No change meds   NICOLE (acute kidney injury) ([facility] ) 4/9/2020    Colon cancer ([facility] )     Vitreous hemorrhage of right eye ([facility] ) 7/19/2016     History reviewed  No pertinent surgical history  Family History:     Family History   Family history unknown: Yes      Social History:     E-Cigarette/Vaping    E-Cigarette Use Never User      E-Cigarette/Vaping Substances    Nicotine No     THC No     CBD No     Flavoring No     Other No     Unknown No      Social History     Socioeconomic History    Marital status: /Civil Union     Spouse name: None    Number of children: None    Years of education: None    Highest education level: None   Occupational History    None   Social Needs    Financial resource strain: None    Food insecurity     Worry: None     Inability: None    Transportation needs     Medical: None     Non-medical: None   Tobacco Use    Smoking status: Former Smoker    Smokeless tobacco: Never Used    Tobacco comment: quit 30 years ago (as of 12/2018)   Substance and Sexual Activity    Alcohol use:  Yes    Drug use: No    Sexual activity: Not Currently   Lifestyle    Physical activity     Days per week: None     Minutes per session: None    Stress: None   Relationships    Social connections     Talks on phone: None     Gets together: None     Attends Protestant service: None     Active member of club or organization: None     Attends meetings of clubs or organizations: None     Relationship status: None    Intimate partner violence     Fear of current or ex partner: None     Emotionally abused: None     Physically abused: None     Forced sexual activity: None   Other Topics Concern    None   Social History Narrative    None      Medications and Allergies:     Current Outpatient Medications   Medication Sig Dispense Refill    atorvastatin (LIPITOR) 40 mg tablet Take 1 tablet (40 mg total) by mouth daily at bedtime 30 tablet 3    cyclopentolate (CYCLOGYL) 1 % ophthalmic solution instill 1 drop into right eye twice a day      ELIQUIS 5 MG Take 5 mg by mouth 2 (two) times a day      glucose blood (Wilmer Contour Test) test strip Test Once Daily 100 each 1    Lancets MISC To test BS once daily  100 each 3    lisinopril (ZESTRIL) 10 mg tablet Take 10 mg by mouth daily      metFORMIN (GLUCOPHAGE) 500 mg tablet take 1 tablet by mouth every 12 hours 180 tablet 1    metoprolol succinate (TOPROL-XL) 100 mg 24 hr tablet Take 100 mg by mouth daily      mirtazapine (REMERON) 15 mg tablet Take 7 5 mg by mouth daily      nitroglycerin (NITROSTAT) 0 4 mg SL tablet Place 0 4 mg under the tongue as needed      TOBRADEX ophthalmic ointment 3 (three) times a day      fluticasone (FLONASE) 50 mcg/act nasal spray 2 sprays into each nostril daily       No current facility-administered medications for this visit  No Known Allergies   Immunizations:     Immunization History   Administered Date(s) Administered    INFLUENZA 09/09/2014, 10/02/2015, 10/04/2016, 11/01/2017, 10/05/2018, 11/07/2019    Influenza Quadrivalent, 6-35 Months IM 11/01/2017    Influenza Split High Dose Preservative Free IM 09/09/2014, 10/02/2015, 10/04/2016    Pneumococcal 11/07/2019    Pneumococcal Conjugate 13-Valent 07/21/2015    TD (adult) Preservative Free 07/02/2015    Tetanus, adsorbed 07/02/2015    Zoster 01/05/2013    Zoster Vaccine Recombinant 05/20/2018    influenza, trivalent, adjuvanted 09/26/2019      Health Maintenance:      There are no preventive care reminders to display for this patient  Topic Date Due    DTaP,Tdap,and Td Vaccines (1 - Tdap) 03/26/1957    Pneumococcal Vaccine: 65+ Years (2 of 2 - PPSV23) 07/21/2016    Influenza Vaccine  07/01/2020      Medicare Screening Tests and Risk Assessments:     Huong Johnson is here for his Subsequent Wellness visit  Last Medicare Wellness visit information reviewed, patient interviewed and updates made to the record today  Health Risk Assessment:   Patient rates overall health as good  Patient feels that their physical health rating is same  Eyesight was rated as same  Hearing was rated as same  Patient feels that their emotional and mental health rating is same  Pain experienced in the last 7 days has been some  Patient's pain rating has been 4/10  Patient states that he has experienced no weight loss or gain in last 6 months  Depression Screening:   PHQ-2 Score: 0      Fall Risk Screening: In the past year, patient has experienced: no history of falling in past year      Home Safety:  Patient has trouble with stairs inside or outside of their home  Patient has working smoke alarms and has working carbon monoxide detector  Home safety hazards include: none  Nutrition:   Current diet is Regular  Medications:   Patient is currently taking over-the-counter supplements  OTC medications include: see medication list  Patient is able to manage medications  Activities of Daily Living (ADLs)/Instrumental Activities of Daily Living (IADLs):   Walk and transfer into and out of bed and chair?: Yes  Dress and groom yourself?: Yes    Bathe or shower yourself?: Yes    Feed yourself?  Yes  Do your laundry/housekeeping?: No  Manage your money, pay your bills and track your expenses?: Yes  Make your own meals?: No    Do your own shopping?: Yes    Previous Hospitalizations:   Any hospitalizations or ED visits within the last 12 months?: Yes    How many hospitalizations have you had in the last year?: 1-2    Advance Care Planning:   Living will: No    Durable POA for healthcare: No    Advanced directive: No    Advanced directive counseling given: Yes    Five wishes given: Yes      Cognitive Screening:   Provider or family/friend/caregiver concerned regarding cognition?: No    PREVENTIVE SCREENINGS      Cardiovascular Screening:    General: Screening Not Indicated and History Lipid Disorder      Diabetes Screening:     General: Screening Not Indicated and History Diabetes      Colorectal Cancer Screening:     General: History Colorectal Cancer      Prostate Cancer Screening:    General: Screening Not Indicated      Osteoporosis Screening:    General: Screening Not Indicated      Abdominal Aortic Aneurysm (AAA) Screening:    Risk factors include: tobacco use        Lung Cancer Screening:     General: Screening Not Indicated      Hepatitis C Screening:    General: Screening Not Indicated    No exam data present     Physical Exam:     /68 (BP Location: Right arm, Patient Position: Sitting, Cuff Size: Adult)   Pulse 87   Temp 97 6 °F (36 4 °C) (Tympanic)   Ht 5' 5" (1 651 m)   Wt 75 8 kg (167 lb)   SpO2 97%   BMI 27 79 kg/m²     Physical Exam     Don Lomas,

## 2020-10-24 ENCOUNTER — OFFICE VISIT (OUTPATIENT)
Dept: FAMILY MEDICINE CLINIC | Facility: CLINIC | Age: 85
End: 2020-10-24
Payer: MEDICARE

## 2020-10-24 VITALS
BODY MASS INDEX: 28.36 KG/M2 | DIASTOLIC BLOOD PRESSURE: 70 MMHG | WEIGHT: 170.4 LBS | SYSTOLIC BLOOD PRESSURE: 114 MMHG | OXYGEN SATURATION: 95 % | TEMPERATURE: 97.6 F | HEART RATE: 61 BPM

## 2020-10-24 DIAGNOSIS — M10.9 ACUTE GOUT INVOLVING TOE OF LEFT FOOT, UNSPECIFIED CAUSE: Primary | ICD-10-CM

## 2020-10-24 PROCEDURE — 99214 OFFICE O/P EST MOD 30 MIN: CPT | Performed by: FAMILY MEDICINE

## 2020-10-24 RX ORDER — PREDNISONE 20 MG/1
40 TABLET ORAL DAILY
Qty: 10 TABLET | Refills: 0 | Status: SHIPPED | OUTPATIENT
Start: 2020-10-24 | End: 2020-10-29

## 2020-12-07 ENCOUNTER — TELEPHONE (OUTPATIENT)
Dept: HEMATOLOGY ONCOLOGY | Facility: CLINIC | Age: 85
End: 2020-12-07

## 2020-12-07 DIAGNOSIS — C18.6 CARCINOMA OF LEFT COLON (HCC): Primary | ICD-10-CM

## 2020-12-15 LAB — HBA1C MFR BLD HPLC: 6.3 %

## 2021-01-07 ENCOUNTER — TELEMEDICINE (OUTPATIENT)
Dept: HEMATOLOGY ONCOLOGY | Facility: CLINIC | Age: 86
End: 2021-01-07
Payer: MEDICARE

## 2021-01-07 DIAGNOSIS — C18.6 CARCINOMA OF LEFT COLON (HCC): Primary | ICD-10-CM

## 2021-01-07 DIAGNOSIS — R97.0 ELEVATED CEA: ICD-10-CM

## 2021-01-07 PROCEDURE — 99441 PR PHYS/QHP TELEPHONE EVALUATION 5-10 MIN: CPT | Performed by: INTERNAL MEDICINE

## 2021-01-07 NOTE — PROGRESS NOTES
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 status post left hemicolectomy stage III (T2, N1, M0) he received adjuvant capecitabine from February 2006 until July 2006    The last colonoscopy in April 2015 with polyp status post removal, no evidence of disease by review of system, lab criteria    He has persistent upper normal limit CEA, current CEA 6 4 (7 in 2019)    He had multiple medical problem including congestive heart failure, atrial fibrillation, CVA, currently on apixaban 5 mg p o  b i d  followed by Cardiology    Follow-up in 1 year with CBC, CMP, CEA  Problem List Items Addressed This Visit        Digestive    Carcinoma of left colon ([facility] ) - Primary    Relevant Orders    CBC and differential    Comprehensive metabolic panel    CEA       Other    Elevated CEA    Relevant Orders    CBC and differential    Comprehensive metabolic panel    CEA                Reason for visit is   Chief Complaint   Patient presents with    Virtual Brief Visit        Encounter provider Zhanna Clark MD    Provider located at [address] [place] [address]  [phone]    Recent Visits  No visits were found meeting these conditions  Showing recent visits within past 7 days and meeting all other requirements     Today's Visits  Date Type Provider Dept   01/07/21 Telemedicine Zhanna Clark MD [facility]   Showing today's visits and meeting all other requirements     Future Appointments  No visits were found meeting these conditions  Showing future appointments within next 150 days and meeting all other requirements        After connecting through telephone, the patient was identified by name and date of birth   Bekah Irving was informed that this is a telemedicine visit and that the visit is being conducted through telephone  My office door was closed  No one else was in the room  He acknowledged consent and understanding of privacy and security of the platform  The patient has agreed to participate and understands he can discontinue the visit at any time  Patient is aware this is a billable service  Subjective    Karine Morse is a 81 y o  male with history of colon cancer, congestive heart failure, CVA, atrial fibrillation, diabetes mellitus type 2  HPI   81-year-old  male who was diagnosed with moderately differentiated adenocarcinoma of the left colon with 1/12 positive lymph nodes status post left hemicolectomy stage III (T2, N1 a, M0) status post adjuvant capecitabine for 8 courses spanning from February 2006-July 2006    The last colonoscopy on April 2015 with polyp 23 cm below the anastomosis removed by forceps    He had congestive heart failure, CVA, coronary artery disease, atrial fibrillation with admission to the hospital on June 2019 with CVA involving the left middle cerebral artery status post tPA and successfully and then mechanical thrombectomy, ejection fraction in the range of 35%-40% he had been on apixaban 5 mg p o  b i d  CEA had been upper normal limit in the range of 7 in 2018    CEA 6 4 in 2020    Past Medical History:   Diagnosis Date    [facility]    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 MRI small bleed CT 8-19 no bleed No neurology follow up LINQ 6-19 PAF seen 11-19   Last Assessment & Plan:  Good recovery CVA- recent LINQ trasmission showed PAF: Would like to start Eliquis but because had CNS bleed needs neurology evaluation make sure ok  No change meds   [facility]ICOLE [facility]) [facility] ) 4/9/2020    Colon cancer ([facility] )     Vitreous hemorrhage of right eye ([facility] ) 7/19/2016       No past surgical history on file      Current Outpatient Medications   Medication Sig Dispense Refill    atorvastatin (LIPITOR) 40 mg tablet Take 1 tablet (40 mg total) by mouth daily at bedtime 30 tablet 3    cyclopentolate (CYCLOGYL) 1 % ophthalmic solution instill 1 drop into right eye twice a day      ELIQUIS 5 MG Take 5 mg by mouth 2 (two) times a day      fluticasone (FLONASE) 50 mcg/act nasal spray 2 sprays into each nostril daily      glucose blood (Wilmer Contour Test) test strip Test Once Daily 100 each 1    Lancets MISC To test BS once daily  100 each 3    lisinopril (ZESTRIL) 10 mg tablet Take 10 mg by mouth daily      metFORMIN (GLUCOPHAGE) 500 mg tablet Take 1 tablet (500 mg total) by mouth every 12 (twelve) hours 180 tablet 1    metoprolol succinate (TOPROL-XL) 100 mg 24 hr tablet Take 1 tablet (100 mg total) by mouth daily 90 tablet 1    mirtazapine (REMERON) 15 mg tablet Take 7 5 mg by mouth daily      nitroglycerin (NITROSTAT) 0 4 mg SL tablet Place 0 4 mg under the tongue as needed      TOBRADEX ophthalmic ointment 3 (three) times a day       No current facility-administered medications for this visit  No Known Allergies    Review of Systems   Constitutional: Positive for fatigue  Negative for chills and fever  HENT: Negative for ear pain and sore throat  Eyes: Negative for pain and visual disturbance  Respiratory: Positive for shortness of breath  Negative for cough  Cardiovascular: Negative for chest pain and palpitations  Gastrointestinal: Negative for abdominal pain, anal bleeding, blood in stool, constipation, diarrhea, nausea, rectal pain and vomiting  Endocrine: Positive for cold intolerance  Genitourinary: Negative for dysuria and hematuria  Musculoskeletal: Positive for arthralgias  Negative for back pain  Skin: Negative for color change and rash  Neurological: Negative for seizures and syncope  Hematological: Negative for adenopathy  Bruises/bleeds easily  All other systems reviewed and are negative  There were no vitals filed for this visit        I spent 10 minutes directly with the patient during this visit    VIRTUAL VISIT 80Felipe Chatterjee acknowledges that he has consented to an online visit or consultation  He understands that the online visit is based solely on information provided by him, and that, in the absence of a face-to-face physical evaluation by the physician, the diagnosis he receives is both limited and provisional in terms of accuracy and completeness  This is not intended to replace a full medical face-to-face evaluation by the physician  Viviana Cloud understands and accepts these terms

## 2021-02-12 DIAGNOSIS — E11.9 TYPE 2 DIABETES MELLITUS WITHOUT COMPLICATION, WITHOUT LONG-TERM CURRENT USE OF INSULIN (HCC): ICD-10-CM

## 2021-02-12 DIAGNOSIS — Z23 ENCOUNTER FOR IMMUNIZATION: ICD-10-CM

## 2021-02-12 DIAGNOSIS — I10 BENIGN ESSENTIAL HYPERTENSION: ICD-10-CM

## 2021-02-12 RX ORDER — METOPROLOL SUCCINATE 100 MG/1
100 TABLET, EXTENDED RELEASE ORAL DAILY
Qty: 90 TABLET | Refills: 1 | Status: SHIPPED | OUTPATIENT
Start: 2021-02-12 | End: 2021-08-06

## 2021-02-12 NOTE — TELEPHONE ENCOUNTER
[facility] requesting a refill on the following medications:  Metformin 500 mg  Metoprolol 100 mg  Please sign off if agreeable

## 2021-04-08 LAB
LEFT EYE DIABETIC RETINOPATHY: NORMAL
RIGHT EYE DIABETIC RETINOPATHY: NORMAL

## 2021-04-12 ENCOUNTER — OFFICE VISIT (OUTPATIENT)
Dept: FAMILY MEDICINE CLINIC | Facility: CLINIC | Age: 86
End: 2021-04-12
Payer: MEDICARE

## 2021-04-12 VITALS
WEIGHT: 176.4 LBS | TEMPERATURE: 97.4 F | SYSTOLIC BLOOD PRESSURE: 140 MMHG | DIASTOLIC BLOOD PRESSURE: 88 MMHG | BODY MASS INDEX: 29.39 KG/M2 | HEIGHT: 65 IN | OXYGEN SATURATION: 98 % | HEART RATE: 77 BPM

## 2021-04-12 DIAGNOSIS — S41.112A SKIN TEAR OF LEFT UPPER ARM WITHOUT COMPLICATION, INITIAL ENCOUNTER: Primary | ICD-10-CM

## 2021-04-12 DIAGNOSIS — E11.8 TYPE 2 DIABETES MELLITUS WITH COMPLICATION, WITHOUT LONG-TERM CURRENT USE OF INSULIN (HCC): ICD-10-CM

## 2021-04-12 DIAGNOSIS — M25.552 LEFT HIP PAIN: ICD-10-CM

## 2021-04-12 PROCEDURE — 99213 OFFICE O/P EST LOW 20 MIN: CPT | Performed by: FAMILY MEDICINE

## 2021-04-12 RX ORDER — BIMATOPROST 0.01 %
DROPS OPHTHALMIC (EYE)
COMMUNITY
Start: 2021-02-11

## 2021-04-12 NOTE — PROGRESS NOTES
[facility]      NAME: Deep Spencer  AGE: 81 y o  SEX: male  : 1935   MRN: [id number]    DATE: 2021  TIME: 9:16 AM    Assessment and Plan     Problem List Items Addressed This Visit     Type 2 diabetes mellitus with complication, without long-term current use of insulin ([facility] [address] )       Lab Results   Component Value Date    HGBA1C 6 3 (H) 12/15/2020   Stable on current therapy           Other Visit Diagnoses     Skin tear of left upper arm without complication, initial encounter    -  Primary    Left hip pain             injuries related to recent fall  Left upper arm skin tear superficial   No sign of infection  Dressing reapplied  Recommend normal wound care  Can take Tylenol for left hip bruise  No x-rays necessary at this time  Return to office in:   P r n  Chief Complaint     Chief Complaint   Patient presents with    Fall     left arm/leg pain  Pt fell on        History of Present Illness       Patient complains of left upper arm pain  He had a fall approximately 8 days ago in his yd /porch area  He has some discomfort in his right forearm with bruising, left hip and thigh and has an open wound on his left upper arm which is covered with a dressing  He is able to ambulate and move all extremities without difficulty but does have some discomfort in his left hip when he lays on that side at night  He is been applying a clean dressing and Neosporin to a skin tear on his left upper arm      The following portions of the patient's history were reviewed and updated as appropriate: allergies, current medications, past family history, past medical history, past social history, past surgical history and problem list     Review of Systems   Review of Systems   Constitutional: Negative  Respiratory: Negative  Cardiovascular: Negative  Gastrointestinal: Negative  Genitourinary: Negative  Musculoskeletal: Positive for arthralgias   Back pain: left hip  Skin: Positive for wound  Psychiatric/Behavioral: Negative  Active Problem List     Patient Active Problem List   Diagnosis    Benign essential hypertension    Carcinoma of left colon (HCC)    Chronic low back pain    Elevated CEA    Elevated transaminase level    Glaucoma    Positive depression screening    Type 2 diabetes mellitus with complication, without long-term current use of insulin (HCC)    Hypertriglyceridemia    Chronic arterial ischemic stroke    Chronic systolic heart failure (HCC)    Coronary artery disease involving native coronary artery    Paroxysmal atrial fibrillation (HCC)    Physical deconditioning    Status post placement of implantable loop recorder    Injury of right foot    Foot pain, right       Objective   /88 (BP Location: Right arm, Patient Position: Sitting, Cuff Size: Standard)   Pulse 77   Temp (!) 97 4 °F (36 3 °C) (Temporal)   Ht 5' 5" (1 651 m)   Wt 80 kg (176 lb 6 4 oz)   SpO2 98%   BMI 29 35 kg/m²     Physical Exam  Cardiovascular:      Pulses: no weak pulses          Dorsalis pedis pulses are 2+ on the right side and 2+ on the left side  Posterior tibial pulses are 2+ on the right side and 2+ on the left side  Feet:      Right foot:      Skin integrity: Callus and dry skin present  No ulcer, skin breakdown, erythema or warmth  Left foot:      Skin integrity: Callus and dry skin present  No ulcer, skin breakdown, erythema or warmth  Skin:     Comments: Superficial skin tears left upper arm       Patient's shoes and socks removed  Right Foot/Ankle   Right Foot Inspection  Skin Exam: skin normal, skin intact, dry skin, callus and callus no warmth, no erythema, no maceration, no abnormal color, no pre-ulcer and no ulcer                          Toe Exam: ROM and strength within normal limits  Sensory   Vibration: intact  Proprioception: intact   Monofilament testing: intact  Vascular  Capillary refills: < 3 seconds  The right DP pulse is 2+  The right PT pulse is 2+  Left Foot/Ankle  Left Foot Inspection  Skin Exam: skin normal, skin intact, dry skin and callusno warmth, no erythema, no maceration, normal color, no pre-ulcer and no ulcer                         Toe Exam: ROM and strength within normal limits                   Sensory   Vibration: intact  Proprioception: intact  Monofilament: intact  Vascular  Capillary refills: < 3 seconds  The left DP pulse is 2+  The left PT pulse is 2+  Assign Risk Category:  No deformity present; No loss of protective sensation; No weak pulses       Risk: 0        Current Medications     Current Outpatient Medications:     atorvastatin (LIPITOR) 40 mg tablet, Take 1 tablet (40 mg total) by mouth daily at bedtime, Disp: 30 tablet, Rfl: 3    glucose blood (Wilmer Contour Test) test strip, Test Once Daily, Disp: 100 each, Rfl: 1    Lancets MISC, To test BS once daily  , Disp: 100 each, Rfl: 3    lisinopril (ZESTRIL) 10 mg tablet, Take 10 mg by mouth daily, Disp: , Rfl:     metFORMIN (GLUCOPHAGE) 500 mg tablet, Take 1 tablet (500 mg total) by mouth every 12 (twelve) hours, Disp: 180 tablet, Rfl: 1    metoprolol succinate (TOPROL-XL) 100 mg 24 hr tablet, Take 1 tablet (100 mg total) by mouth daily, Disp: 90 tablet, Rfl: 1    mirtazapine (REMERON) 15 mg tablet, Take 7 5 mg by mouth daily, Disp: , Rfl:     TOBRADEX ophthalmic ointment, 3 (three) times a day, Disp: , Rfl:     cyclopentolate (CYCLOGYL) 1 % ophthalmic solution, instill 1 drop into right eye twice a day, Disp: , Rfl:     ELIQUIS 5 MG, Take 5 mg by mouth 2 (two) times a day, Disp: , Rfl:     fluticasone (FLONASE) 50 mcg/act nasal spray, 2 sprays into each nostril daily, Disp: , Rfl:     Lumigan 0 01 % ophthalmic drops, , Disp: , Rfl:     meclizine (ANTIVERT) 25 mg tablet, Take 1 tablet (25 mg total) by mouth every 8 (eight) hours as needed for dizziness, Disp: 30 tablet, Rfl: 0    methylPREDNISolone 4 MG tablet therapy pack, Use as directed on package, Disp: 21 each, Rfl: 0    nitroglycerin (NITROSTAT) 0 4 mg SL tablet, Place 0 4 mg under the tongue as needed, Disp: , Rfl:     Health Maintenance     Health Maintenance   Topic Date Due    DTaP,Tdap,and Td Vaccines (1 - Tdap) 03/26/1956    Pneumococcal Vaccine: 65+ Years (1 of 2 - PPSV23) 09/15/2015    HEMOGLOBIN A1C  06/15/2021    Fall Risk  09/21/2021    Depression Screening PHQ  09/21/2021    Medicare Annual Wellness Visit (AWV)  09/21/2021    BMI: Followup Plan  09/21/2021    DM Eye Exam  04/08/2022    Diabetic Foot Exam  04/12/2022    BMI: Adult  06/03/2022    Influenza Vaccine  Completed    COVID-19 Vaccine  Completed    HIB Vaccine  Aged Out    Hepatitis B Vaccine  Aged Out    IPV Vaccine  Aged Out    Hepatitis A Vaccine  Aged Out    Meningococcal ACWY Vaccine  Aged Out    HPV Vaccine  Aged Out     Immunization History   Administered Date(s) Administered    INFLUENZA 09/09/2014, 10/02/2015, 10/04/2016, 11/01/2017, 10/05/2018, 11/07/2019, 08/28/2020, 10/03/2020    Influenza Quadrivalent, 6-35 Months IM 11/01/2017    Influenza Split High Dose Preservative Free IM 09/09/2014, 10/02/2015, 10/04/2016    Influenza, high dose seasonal 0 7 mL 10/03/2020    Pneumococcal 11/07/2019    Pneumococcal Conjugate 13-Valent 07/21/2015    SARS-CoV-2 / COVID-19 mRNA IM ([address]) 01/28/2021, 02/25/2021    TD (adult) Preservative Free 07/02/2015    Tetanus, adsorbed 07/02/2015    Zoster 01/05/2013    Zoster Vaccine Recombinant 05/20/2018    influenza, trivalent, adjuvanted 09/26/2019       Israel Luciano DO  [facility]

## 2021-04-21 ENCOUNTER — TELEPHONE (OUTPATIENT)
Dept: FAMILY MEDICINE CLINIC | Facility: CLINIC | Age: 86
End: 2021-04-21

## 2021-04-21 DIAGNOSIS — R53.81 PHYSICAL DECONDITIONING: Primary | ICD-10-CM

## 2021-04-21 DIAGNOSIS — M54.50 CHRONIC LOW BACK PAIN WITHOUT SCIATICA, UNSPECIFIED BACK PAIN LATERALITY: ICD-10-CM

## 2021-04-21 DIAGNOSIS — R26.9 ABNORMALITY OF GAIT: ICD-10-CM

## 2021-04-21 DIAGNOSIS — G89.29 CHRONIC LOW BACK PAIN WITHOUT SCIATICA, UNSPECIFIED BACK PAIN LATERALITY: ICD-10-CM

## 2021-04-21 NOTE — TELEPHONE ENCOUNTER
Patient would like an order put in for a walker  He said it is called a rolater(sp?), truly asked him several times and not sure what he was saying  Please call him and let him know when the order is in  [phone]

## 2021-05-17 ENCOUNTER — OFFICE VISIT (OUTPATIENT)
Dept: FAMILY MEDICINE CLINIC | Facility: CLINIC | Age: 86
End: 2021-05-17
Payer: MEDICARE

## 2021-05-17 ENCOUNTER — APPOINTMENT (OUTPATIENT)
Dept: RADIOLOGY | Facility: CLINIC | Age: 86
End: 2021-05-17
Payer: MEDICARE

## 2021-05-17 VITALS
HEART RATE: 90 BPM | DIASTOLIC BLOOD PRESSURE: 60 MMHG | SYSTOLIC BLOOD PRESSURE: 118 MMHG | TEMPERATURE: 97.9 F | OXYGEN SATURATION: 98 % | BODY MASS INDEX: 28.76 KG/M2 | HEIGHT: 65 IN | WEIGHT: 172.6 LBS

## 2021-05-17 DIAGNOSIS — S99.921A INJURY OF RIGHT FOOT, INITIAL ENCOUNTER: ICD-10-CM

## 2021-05-17 DIAGNOSIS — S99.921A INJURY OF RIGHT FOOT, INITIAL ENCOUNTER: Primary | ICD-10-CM

## 2021-05-17 PROCEDURE — 73630 X-RAY EXAM OF FOOT: CPT

## 2021-05-17 PROCEDURE — 99214 OFFICE O/P EST MOD 30 MIN: CPT | Performed by: FAMILY MEDICINE

## 2021-05-17 NOTE — ASSESSMENT & PLAN NOTE
Patient reports injury to right foot 4 weeks ago after he dropped water softener cartridge on his foot  Mild bony tenderness over 3rd base of toe  Obtain Xray to rule out fracture/abnormality  Patient walking with rolling for stability     Will consider referral to Ortho/sports medicine for recommendations if Xray abnormal

## 2021-05-17 NOTE — PROGRESS NOTES
Assessment/Plan:    1  Injury of right foot, initial encounter  Assessment & Plan:  Patient reports injury to right foot 4 weeks ago after he dropped water softener cartridge on his foot  Mild bony tenderness over 3rd base of toe  Obtain Xray to rule out fracture/abnormality  Patient walking with rolling for stability  Will consider referral to Ortho/sports medicine for recommendations if Xray abnormal      Orders:  -     XR foot 3+ vw right; Future; Expected date: 05/17/2021      Subjective:      Patient ID: Rafa Kaur is a 81 y o  male  HPI    Patient is presenting with chronic pain in his right foot for the last month  4 weeks ago he dropped a water softener on his right foot  The foot was bruised and swollen at the base of his toes  He did not go to the doctor for this injury  Patient states that he is able to ambulate but he has pain mostly located in the bottom of his foot  No swelling or deformity  All other ROS negative  The following portions of the patient's history were reviewed and updated as appropriate: allergies, current medications, past family history, past medical history, past social history, past surgical history, and problem list       Current Outpatient Medications:     atorvastatin (LIPITOR) 40 mg tablet, Take 1 tablet (40 mg total) by mouth daily at bedtime, Disp: 30 tablet, Rfl: 3    cyclopentolate (CYCLOGYL) 1 % ophthalmic solution, instill 1 drop into right eye twice a day, Disp: , Rfl:     ELIQUIS 5 MG, Take 5 mg by mouth 2 (two) times a day, Disp: , Rfl:     fluticasone (FLONASE) 50 mcg/act nasal spray, 2 sprays into each nostril daily, Disp: , Rfl:     glucose blood (Wilmer Contour Test) test strip, Test Once Daily, Disp: 100 each, Rfl: 1    Lancets MISC, To test BS once daily  , Disp: 100 each, Rfl: 3    lisinopril (ZESTRIL) 10 mg tablet, Take 10 mg by mouth daily, Disp: , Rfl:     Lumigan 0 01 % ophthalmic drops, , Disp: , Rfl:     metFORMIN (GLUCOPHAGE) 500 mg tablet, Take 1 tablet (500 mg total) by mouth every 12 (twelve) hours, Disp: 180 tablet, Rfl: 1    metoprolol succinate (TOPROL-XL) 100 mg 24 hr tablet, Take 1 tablet (100 mg total) by mouth daily, Disp: 90 tablet, Rfl: 1    mirtazapine (REMERON) 15 mg tablet, Take 7 5 mg by mouth daily, Disp: , Rfl:     nitroglycerin (NITROSTAT) 0 4 mg SL tablet, Place 0 4 mg under the tongue as needed, Disp: , Rfl:     TOBRADEX ophthalmic ointment, 3 (three) times a day, Disp: , Rfl:       Review of Systems   Constitutional: Negative for chills and fever  HENT: Negative for ear pain and sore throat  Eyes: Negative for pain and visual disturbance  Respiratory: Negative for cough and shortness of breath  Cardiovascular: Negative for chest pain and palpitations  Gastrointestinal: Negative for abdominal pain and vomiting  Genitourinary: Negative for dysuria and hematuria  Musculoskeletal: Positive for arthralgias (foot pain )  Negative for back pain  Skin: Negative for color change and rash  Neurological: Negative for seizures and syncope  All other systems reviewed and are negative  Objective:      /60 (BP Location: Right arm, Patient Position: Sitting, Cuff Size: Large)   Pulse 90   Temp 97 9 °F (36 6 °C)   Ht 5' 5" (1 651 m)   Wt 78 3 kg (172 lb 9 6 oz)   SpO2 98%   BMI 28 72 kg/m²          Physical Exam  Vitals signs and nursing note reviewed  Constitutional:       General: He is not in acute distress  Appearance: Normal appearance  He is not ill-appearing  HENT:      Head: Normocephalic and atraumatic  Eyes:      Extraocular Movements: Extraocular movements intact  Cardiovascular:      Rate and Rhythm: Normal rate and regular rhythm  Pulmonary:      Effort: Pulmonary effort is normal  No respiratory distress  Breath sounds: Normal breath sounds  No wheezing  Musculoskeletal:      Right ankle: Normal       Right lower leg: No edema        Right foot: Normal range of motion and normal capillary refill  Bony tenderness present  No tenderness, swelling, crepitus or deformity  Feet:    Skin:     General: Skin is warm  Findings: No bruising, erythema or lesion  Neurological:      General: No focal deficit present  Mental Status: He is alert        Gait: Gait (using rolling walker ) normal    Psychiatric:         Mood and Affect: Mood normal          Behavior: Behavior normal

## 2021-05-26 ENCOUNTER — OFFICE VISIT (OUTPATIENT)
Dept: FAMILY MEDICINE CLINIC | Facility: CLINIC | Age: 86
End: 2021-05-26
Payer: MEDICARE

## 2021-05-26 VITALS
OXYGEN SATURATION: 94 % | HEIGHT: 65 IN | SYSTOLIC BLOOD PRESSURE: 122 MMHG | HEART RATE: 73 BPM | BODY MASS INDEX: 28.29 KG/M2 | TEMPERATURE: 97.7 F | WEIGHT: 169.8 LBS | DIASTOLIC BLOOD PRESSURE: 84 MMHG

## 2021-05-26 DIAGNOSIS — M79.671 FOOT PAIN, RIGHT: Primary | ICD-10-CM

## 2021-05-26 PROCEDURE — 99214 OFFICE O/P EST MOD 30 MIN: CPT | Performed by: FAMILY MEDICINE

## 2021-05-26 RX ORDER — METHYLPREDNISOLONE 4 MG/1
TABLET ORAL
Qty: 21 EACH | Refills: 0 | Status: SHIPPED | OUTPATIENT
Start: 2021-05-26

## 2021-05-26 NOTE — PROGRESS NOTES
Assessment/Plan:    1  Foot pain, right  Assessment & Plan:  Xray of right foot normal   Patient reports improvement in pain today  He is still ambulating with walker due to the pain  No increased swelling or redness  Patient with history of gout in the past    We will start treatment with prednisone taper at this time  Follow up if symptoms do not improve within 1 week and will refer to Ortho  Patient verbalized understanding  Orders:  -     methylPREDNISolone 4 MG tablet therapy pack; Use as directed on package      Subjective:      Patient ID: Henry López is a 81 y o  male  HPI    Patient presenting for follow up of foot pain  His Xray was normal and was reviewed with the patient  Atilio does not wish to see Sports Medicine for his foot pain because now he believes that his symptoms are due to gout  He recalls history of gout in his foot in the past   The pain has been present for a month  He does states that the pain has improved, but is still present over the top of his foot  He denies swelling, redness or warmth to touch  No other recent events  The following portions of the patient's history were reviewed and updated as appropriate: allergies, current medications, past family history, past medical history, past social history, past surgical history, and problem list       Current Outpatient Medications:     atorvastatin (LIPITOR) 40 mg tablet, Take 1 tablet (40 mg total) by mouth daily at bedtime, Disp: 30 tablet, Rfl: 3    cyclopentolate (CYCLOGYL) 1 % ophthalmic solution, instill 1 drop into right eye twice a day, Disp: , Rfl:     ELIQUIS 5 MG, Take 5 mg by mouth 2 (two) times a day, Disp: , Rfl:     fluticasone (FLONASE) 50 mcg/act nasal spray, 2 sprays into each nostril daily, Disp: , Rfl:     glucose blood (Wilmer Contour Test) test strip, Test Once Daily, Disp: 100 each, Rfl: 1    Lancets MISC, To test BS once daily  , Disp: 100 each, Rfl: 3    lisinopril (ZESTRIL) 10 mg tablet, Take 10 mg by mouth daily, Disp: , Rfl:     Lumigan 0 01 % ophthalmic drops, , Disp: , Rfl:     metFORMIN (GLUCOPHAGE) 500 mg tablet, Take 1 tablet (500 mg total) by mouth every 12 (twelve) hours, Disp: 180 tablet, Rfl: 1    metoprolol succinate (TOPROL-XL) 100 mg 24 hr tablet, Take 1 tablet (100 mg total) by mouth daily, Disp: 90 tablet, Rfl: 1    mirtazapine (REMERON) 15 mg tablet, Take 7 5 mg by mouth daily, Disp: , Rfl:     nitroglycerin (NITROSTAT) 0 4 mg SL tablet, Place 0 4 mg under the tongue as needed, Disp: , Rfl:     TOBRADEX ophthalmic ointment, 3 (three) times a day, Disp: , Rfl:     methylPREDNISolone 4 MG tablet therapy pack, Use as directed on package, Disp: 21 each, Rfl: 0      Review of Systems   Constitutional: Negative for chills and fever  HENT: Negative for ear pain and sore throat  Eyes: Negative for pain and visual disturbance  Respiratory: Negative for cough and shortness of breath  Cardiovascular: Negative for chest pain and palpitations  Gastrointestinal: Negative for abdominal pain and vomiting  Genitourinary: Negative for dysuria and hematuria  Musculoskeletal: Positive for arthralgias (right foot pain)  Negative for back pain  Skin: Negative for color change and rash  Neurological: Negative for seizures and syncope  All other systems reviewed and are negative  Objective:      /84 (BP Location: Right arm, Patient Position: Sitting, Cuff Size: Large)   Pulse 73   Temp 97 7 °F (36 5 °C) (Tympanic)   Ht 5' 5" (1 651 m)   Wt 77 kg (169 lb 12 8 oz)   SpO2 94%   BMI 28 26 kg/m²          Physical Exam  Vitals signs and nursing note reviewed  Constitutional:       General: He is not in acute distress  Appearance: Normal appearance  He is not ill-appearing  HENT:      Head: Normocephalic and atraumatic  Cardiovascular:      Rate and Rhythm: Normal rate and regular rhythm  Heart sounds: Normal heart sounds  Pulmonary:      Effort: Pulmonary effort is normal  No respiratory distress  Breath sounds: Normal breath sounds  No wheezing  Skin:     General: Skin is warm  Neurological:      General: No focal deficit present  Mental Status: He is alert  Psychiatric:         Mood and Affect: Mood normal          Behavior: Behavior normal          Thought Content:  Thought content normal

## 2021-05-26 NOTE — ASSESSMENT & PLAN NOTE
Xray of right foot normal   Patient reports improvement in pain today  He is still ambulating with walker due to the pain  No increased swelling or redness  Patient with history of gout in the past    We will start treatment with prednisone taper at this time  Follow up if symptoms do not improve within 1 week and will refer to Ortho  Patient verbalized understanding

## 2021-06-03 ENCOUNTER — OFFICE VISIT (OUTPATIENT)
Dept: FAMILY MEDICINE CLINIC | Facility: CLINIC | Age: 86
End: 2021-06-03
Payer: MEDICARE

## 2021-06-03 VITALS
OXYGEN SATURATION: 95 % | HEIGHT: 65 IN | SYSTOLIC BLOOD PRESSURE: 128 MMHG | WEIGHT: 171 LBS | TEMPERATURE: 97.5 F | HEART RATE: 66 BPM | BODY MASS INDEX: 28.49 KG/M2 | DIASTOLIC BLOOD PRESSURE: 80 MMHG | RESPIRATION RATE: 16 BRPM

## 2021-06-03 DIAGNOSIS — H81.10 BENIGN PAROXYSMAL POSITIONAL VERTIGO, UNSPECIFIED LATERALITY: Primary | ICD-10-CM

## 2021-06-03 PROCEDURE — 99214 OFFICE O/P EST MOD 30 MIN: CPT | Performed by: FAMILY MEDICINE

## 2021-06-03 RX ORDER — MECLIZINE HYDROCHLORIDE 25 MG/1
25 TABLET ORAL EVERY 8 HOURS PRN
Qty: 30 TABLET | Refills: 0 | Status: SHIPPED | OUTPATIENT
Start: 2021-06-03 | End: 2021-06-10 | Stop reason: SDUPTHER

## 2021-06-03 NOTE — PROGRESS NOTES
Assessment/Plan:   1  Benign paroxysmal positional vertigo, unspecified laterality    The patient's symptoms appeared the stone, symptoms appear most likely secondary to benign proximal positional vertigo  His cardiac exam was negative today  He does see Cardiology regularly  He also does have a  ICD which has been placed  This time, will hold off on further testing  Will start treatment empirically with meclizine 25 mg t i d  p r n   If any symptoms should worsen, he must call or follow up immediately  - meclizine (ANTIVERT) 25 mg tablet; Take 1 tablet (25 mg total) by mouth every 8 (eight) hours as needed for dizziness  Dispense: 30 tablet; Refill: 0           There are no diagnoses linked to this encounter  Subjective:       Chief Complaint   Patient presents with    Dizziness     when laying down       Patient ID: Ladarius Castro is a 81 y o  male  Patient is a 69-year-old male presents today with a CC of dizziness  He states the resources for the past 2 weeks  He only develops these symptoms when he lays down or gets up from a laying down position  He feels a spinning sensation  He denies any syncopal events  He also denies any headaches, palpitations, chest pain or shortness of breath  He has not been taking anything for symptom relief  Review of Systems   Constitutional: Negative for activity change, chills, fatigue and fever  HENT: Negative for congestion, ear pain, sinus pressure and sore throat  Eyes: Negative for redness, itching and visual disturbance  Respiratory: Negative for cough and shortness of breath  Cardiovascular: Negative for chest pain and palpitations  Gastrointestinal: Negative for abdominal pain, diarrhea and nausea  Endocrine: Negative for cold intolerance and heat intolerance  Genitourinary: Negative for dysuria, flank pain and frequency  Musculoskeletal: Negative for arthralgias, back pain, gait problem and myalgias     Skin: Negative for color change  Allergic/Immunologic: Negative for environmental allergies  Neurological: Negative for dizziness, numbness and headaches  Psychiatric/Behavioral: Negative for behavioral problems and sleep disturbance  The following portions of the patient's history were reviewed and updated as appropriate : past family history, past medical history, past social history and past surgical history  Current Outpatient Medications:     atorvastatin (LIPITOR) 40 mg tablet, Take 1 tablet (40 mg total) by mouth daily at bedtime, Disp: 30 tablet, Rfl: 3    cyclopentolate (CYCLOGYL) 1 % ophthalmic solution, instill 1 drop into right eye twice a day, Disp: , Rfl:     ELIQUIS 5 MG, Take 5 mg by mouth 2 (two) times a day, Disp: , Rfl:     fluticasone (FLONASE) 50 mcg/act nasal spray, 2 sprays into each nostril daily, Disp: , Rfl:     glucose blood (Wilmer Contour Test) test strip, Test Once Daily, Disp: 100 each, Rfl: 1    Lancets MISC, To test BS once daily  , Disp: 100 each, Rfl: 3    lisinopril (ZESTRIL) 10 mg tablet, Take 10 mg by mouth daily, Disp: , Rfl:     Lumigan 0 01 % ophthalmic drops, , Disp: , Rfl:     metFORMIN (GLUCOPHAGE) 500 mg tablet, Take 1 tablet (500 mg total) by mouth every 12 (twelve) hours, Disp: 180 tablet, Rfl: 1    methylPREDNISolone 4 MG tablet therapy pack, Use as directed on package, Disp: 21 each, Rfl: 0    metoprolol succinate (TOPROL-XL) 100 mg 24 hr tablet, Take 1 tablet (100 mg total) by mouth daily, Disp: 90 tablet, Rfl: 1    mirtazapine (REMERON) 15 mg tablet, Take 7 5 mg by mouth daily, Disp: , Rfl:     TOBRADEX ophthalmic ointment, 3 (three) times a day, Disp: , Rfl:     nitroglycerin (NITROSTAT) 0 4 mg SL tablet, Place 0 4 mg under the tongue as needed, Disp: , Rfl:          Objective:         Vitals:    06/03/21 1423   BP: 128/80   BP Location: Right arm   Patient Position: Sitting   Cuff Size: Adult   Pulse: 66   Resp: 16   Temp: 97 5 °F (36 4 °C)   TempSrc: Tympanic   SpO2: 95%   Weight: 77 6 kg (171 lb)   Height: 5' 5" (1 651 m)     Physical Exam  Vitals signs reviewed  Constitutional:       Appearance: He is well-developed  HENT:      Head: Normocephalic and atraumatic  Nose: Nose normal       Mouth/Throat:      Pharynx: No oropharyngeal exudate  Eyes:      General: No scleral icterus  Right eye: No discharge  Left eye: No discharge  Pupils: Pupils are equal, round, and reactive to light  Neck:      Musculoskeletal: Normal range of motion and neck supple  Trachea: No tracheal deviation  Cardiovascular:      Rate and Rhythm: Normal rate and regular rhythm  Pulses:           Dorsalis pedis pulses are 2+ on the right side and 2+ on the left side  Posterior tibial pulses are 2+ on the right side and 2+ on the left side  Heart sounds: Normal heart sounds  No murmur  No friction rub  No gallop  Comments:   No carotid bruit  Pulmonary:      Effort: Pulmonary effort is normal  No respiratory distress  Breath sounds: Normal breath sounds  No wheezing or rales  Abdominal:      General: Bowel sounds are normal  There is no distension  Palpations: Abdomen is soft  Tenderness: There is no abdominal tenderness  There is no guarding or rebound  Musculoskeletal: Normal range of motion  Lymphadenopathy:      Head:      Right side of head: No submental or submandibular adenopathy  Left side of head: No submental or submandibular adenopathy  Cervical: No cervical adenopathy  Right cervical: No superficial, deep or posterior cervical adenopathy  Left cervical: No superficial, deep or posterior cervical adenopathy  Skin:     General: Skin is warm and dry  Findings: No erythema  Neurological:      Mental Status: He is alert and oriented to person, place, and time  Cranial Nerves: No cranial nerve deficit  Sensory: No sensory deficit     Psychiatric:         Mood and Affect: Mood is not anxious or depressed  Speech: Speech normal          Behavior: Behavior normal          Thought Content:  Thought content normal          Judgment: Judgment normal

## 2021-06-10 DIAGNOSIS — H81.10 BENIGN PAROXYSMAL POSITIONAL VERTIGO, UNSPECIFIED LATERALITY: ICD-10-CM

## 2021-06-10 RX ORDER — MECLIZINE HYDROCHLORIDE 25 MG/1
25 TABLET ORAL EVERY 8 HOURS PRN
Qty: 30 TABLET | Refills: 0 | Status: SHIPPED | OUTPATIENT
Start: 2021-06-10

## 2021-07-05 ENCOUNTER — HOSPITAL ENCOUNTER (EMERGENCY)
Facility: HOSPITAL | Age: 86
Discharge: HOME | End: 2021-07-05
Attending: EMERGENCY MEDICINE | Admitting: EMERGENCY MEDICINE
Payer: MEDICARE

## 2021-07-05 ENCOUNTER — APPOINTMENT (EMERGENCY)
Dept: RADIOLOGY | Facility: HOSPITAL | Age: 86
End: 2021-07-05
Payer: MEDICARE

## 2021-07-05 ENCOUNTER — OFFICE VISIT (OUTPATIENT)
Dept: URGENT CARE | Facility: MEDICAL CENTER | Age: 86
End: 2021-07-05
Payer: MEDICARE

## 2021-07-05 VITALS
HEART RATE: 95 BPM | SYSTOLIC BLOOD PRESSURE: 139 MMHG | TEMPERATURE: 98.9 F | DIASTOLIC BLOOD PRESSURE: 67 MMHG | OXYGEN SATURATION: 98 % | BODY MASS INDEX: 28.46 KG/M2 | WEIGHT: 171 LBS | RESPIRATION RATE: 18 BRPM

## 2021-07-05 VITALS
TEMPERATURE: 98.5 F | OXYGEN SATURATION: 96 % | HEART RATE: 83 BPM | RESPIRATION RATE: 16 BRPM | SYSTOLIC BLOOD PRESSURE: 146 MMHG | DIASTOLIC BLOOD PRESSURE: 89 MMHG

## 2021-07-05 DIAGNOSIS — R53.81 PHYSICAL DECONDITIONING: ICD-10-CM

## 2021-07-05 DIAGNOSIS — R53.83 FATIGUE, UNSPECIFIED TYPE: Primary | ICD-10-CM

## 2021-07-05 DIAGNOSIS — R42 DIZZINESS: Primary | ICD-10-CM

## 2021-07-05 LAB
ANION GAP SERPL CALCULATED.3IONS-SCNC: 8 MMOL/L (ref 4–13)
BACTERIA UR QL AUTO: ABNORMAL /HPF
BASOPHILS # BLD AUTO: 0.01 THOUSANDS/ΜL (ref 0–0.1)
BASOPHILS NFR BLD AUTO: 0 % (ref 0–1)
BILIRUB UR QL STRIP: NEGATIVE
BUN SERPL-MCNC: 13 MG/DL (ref 5–25)
CALCIUM SERPL-MCNC: 8.4 MG/DL (ref 8.3–10.1)
CHLORIDE SERPL-SCNC: 106 MMOL/L (ref 100–108)
CLARITY UR: CLEAR
CO2 SERPL-SCNC: 29 MMOL/L (ref 21–32)
COLOR UR: YELLOW
CREAT SERPL-MCNC: 1.2 MG/DL (ref 0.6–1.3)
EOSINOPHIL # BLD AUTO: 0.07 THOUSAND/ΜL (ref 0–0.61)
EOSINOPHIL NFR BLD AUTO: 1 % (ref 0–6)
ERYTHROCYTE [DISTWIDTH] IN BLOOD BY AUTOMATED COUNT: 12.6 % (ref 11.6–15.1)
GFR SERPL CREATININE-BSD FRML MDRD: 55 ML/MIN/1.73SQ M
GLUCOSE SERPL-MCNC: 159 MG/DL (ref 65–140)
GLUCOSE SERPL-MCNC: 221 MG/DL (ref 65–140)
GLUCOSE UR STRIP-MCNC: NEGATIVE MG/DL
HCT VFR BLD AUTO: 40.1 % (ref 36.5–49.3)
HGB BLD-MCNC: 13.5 G/DL (ref 12–17)
HGB UR QL STRIP.AUTO: ABNORMAL
IMM GRANULOCYTES # BLD AUTO: 0.03 THOUSAND/UL (ref 0–0.2)
IMM GRANULOCYTES NFR BLD AUTO: 1 % (ref 0–2)
KETONES UR STRIP-MCNC: NEGATIVE MG/DL
LEUKOCYTE ESTERASE UR QL STRIP: NEGATIVE
LYMPHOCYTES # BLD AUTO: 1.62 THOUSANDS/ΜL (ref 0.6–4.47)
LYMPHOCYTES NFR BLD AUTO: 26 % (ref 14–44)
MCH RBC QN AUTO: 31.1 PG (ref 26.8–34.3)
MCHC RBC AUTO-ENTMCNC: 33.7 G/DL (ref 31.4–37.4)
MCV RBC AUTO: 92 FL (ref 82–98)
MONOCYTES # BLD AUTO: 1.08 THOUSAND/ΜL (ref 0.17–1.22)
MONOCYTES NFR BLD AUTO: 17 % (ref 4–12)
NEUTROPHILS # BLD AUTO: 3.52 THOUSANDS/ΜL (ref 1.85–7.62)
NEUTS SEG NFR BLD AUTO: 55 % (ref 43–75)
NITRITE UR QL STRIP: NEGATIVE
NON-SQ EPI CELLS URNS QL MICRO: ABNORMAL /HPF
NRBC BLD AUTO-RTO: 0 /100 WBCS
PH UR STRIP.AUTO: 6 [PH] (ref 4.5–8)
PLATELET # BLD AUTO: 147 THOUSANDS/UL (ref 149–390)
PMV BLD AUTO: 10.6 FL (ref 8.9–12.7)
POTASSIUM SERPL-SCNC: 3.9 MMOL/L (ref 3.5–5.3)
PROT UR STRIP-MCNC: NEGATIVE MG/DL
RBC # BLD AUTO: 4.34 MILLION/UL (ref 3.88–5.62)
RBC #/AREA URNS AUTO: ABNORMAL /HPF
SODIUM SERPL-SCNC: 143 MMOL/L (ref 136–145)
SP GR UR STRIP.AUTO: 1.01 (ref 1–1.03)
TROPONIN I SERPL-MCNC: <0.02 NG/ML
TSH SERPL DL<=0.05 MIU/L-ACNC: 1.78 UIU/ML (ref 0.36–3.74)
UROBILINOGEN UR QL STRIP.AUTO: 0.2 E.U./DL
WBC # BLD AUTO: 6.33 THOUSAND/UL (ref 4.31–10.16)
WBC #/AREA URNS AUTO: ABNORMAL /HPF

## 2021-07-05 PROCEDURE — 81001 URINALYSIS AUTO W/SCOPE: CPT

## 2021-07-05 PROCEDURE — 84443 ASSAY THYROID STIM HORMONE: CPT | Performed by: EMERGENCY MEDICINE

## 2021-07-05 PROCEDURE — G0463 HOSPITAL OUTPT CLINIC VISIT: HCPCS | Performed by: PHYSICIAN ASSISTANT

## 2021-07-05 PROCEDURE — 99213 OFFICE O/P EST LOW 20 MIN: CPT | Performed by: PHYSICIAN ASSISTANT

## 2021-07-05 PROCEDURE — 97163 PT EVAL HIGH COMPLEX 45 MIN: CPT

## 2021-07-05 PROCEDURE — 80048 BASIC METABOLIC PNL TOTAL CA: CPT | Performed by: EMERGENCY MEDICINE

## 2021-07-05 PROCEDURE — 85025 COMPLETE CBC W/AUTO DIFF WBC: CPT | Performed by: EMERGENCY MEDICINE

## 2021-07-05 PROCEDURE — 99285 EMERGENCY DEPT VISIT HI MDM: CPT | Performed by: EMERGENCY MEDICINE

## 2021-07-05 PROCEDURE — 84484 ASSAY OF TROPONIN QUANT: CPT | Performed by: EMERGENCY MEDICINE

## 2021-07-05 PROCEDURE — 71045 X-RAY EXAM CHEST 1 VIEW: CPT

## 2021-07-05 PROCEDURE — 82948 REAGENT STRIP/BLOOD GLUCOSE: CPT | Performed by: PHYSICIAN ASSISTANT

## 2021-07-05 PROCEDURE — 93005 ELECTROCARDIOGRAM TRACING: CPT

## 2021-07-05 PROCEDURE — 36415 COLL VENOUS BLD VENIPUNCTURE: CPT | Performed by: EMERGENCY MEDICINE

## 2021-07-05 PROCEDURE — 99284 EMERGENCY DEPT VISIT MOD MDM: CPT

## 2021-07-05 RX ORDER — ACETAMINOPHEN 325 MG/1
TABLET ORAL EVERY 6 HOURS
COMMUNITY

## 2021-07-05 RX ORDER — ACETAMINOPHEN 325 MG/1
975 TABLET ORAL ONCE
Status: COMPLETED | OUTPATIENT
Start: 2021-07-05 | End: 2021-07-05

## 2021-07-05 RX ADMIN — ACETAMINOPHEN 975 MG: 325 TABLET, FILM COATED ORAL at 10:45

## 2021-07-05 NOTE — PHYSICAL THERAPY NOTE
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 MRI small bleed CT 8-19 no bleed No neurology follow up LINQ 6-19 PAF seen 11-19   Last Assessment & Plan:  Good recovery CVA- recent LINQ trasmission showed PAF: Would like to start Eliquis but because had CNS bleed needs neurology evaluation make sure ok  No change meds   NICOLE (acute kidney [facility]) ([facility] ) 4/9/2020    Colon cancer ([facility] )     Vitreous hemorrhage of right eye ([facility] ) 7/19/2016     History reviewed  No pertinent surgical history  07/05/21 1233   PT Last Visit   PT Visit Date 07/05/21   Note Type   Note type Evaluation   Pain Assessment   Pain Assessment Tool Pain Assessment not indicated - pt denies pain   Pain Score No Pain   Home Living   Type of [address] [place] [address] One level;Stairs to enter without rails   Bathroom Shower/Tub Walk-in shower   Bathroom Toilet Raised   Bathroom Equipment Grab bars in [facility]; Other (Comment)  (Rollator)   Additional Comments 1 MIGUEL   Prior Function   Level of [place] Independent with ADLs and functional mobility; Needs assistance with IADLs   Lives With Spouse   Receives Help From Family   ADL Assistance Independent   IADLs Needs assistance   Falls in the last 6 months 1 to 4  (1)   Vocational Retired   Comments use of AD prn at home, reports he will use the walker if he feels unsteady as well as when ambulating in community  +  spouse home w patient to assist   Restrictions/Precautions   Weight Bearing Precautions Per Order No   Other Precautions Multiple lines;Telemetry; Fall Risk   General   Additional Pertinent History pt admitted to ED with complaints of fatigue, weakness and fall pta  reports hx of balance issues w "close call" falls d/t LOB, has hx of vertigo and currently on meclizine   Family/Caregiver Present Yes   Cognition   Overall Cognitive Status WFL   Arousal/Participation Cooperative   Orientation Level Oriented X4   Memory Within functional limits   Following Commands Follows all commands and directions without difficulty   RLE Assessment   RLE Assessment WFL   LLE Assessment   LLE Assessment WFL   Coordination   Sensation WFL  (denies n/t)   Bed Mobility   Supine to Sit 5  Supervision   Additional items Increased time required   Sit to Supine 5  Supervision   Additional items Increased time required   Transfers   Sit to Stand 5  Supervision   Additional items Increased time required; Other  (RW)   Stand to Sit 5  Supervision   Additional items Increased time required   Ambulation/Elevation   Gait pattern Short stride; Excessively slow   Gait Assistance 5  Supervision  (CGA)   Additional items Assist x 1   Assistive Device Rolling walker   Distance 25'   Balance   Static Standing Fair   Dynamic Standing Fair -   Ambulatory Fair -   Endurance Deficit   Endurance Deficit No   Endurance Deficit Description vitals supine at rest: 141/74, 87, 95% RA  /89, 90  Activity Tolerance   Activity Tolerance Patient tolerated treatment well   Medical Staff Made Aware Nena Momin resident   Assessment   Prognosis Good   Problem List Impaired balance   Assessment Germain Rosario is a 81 y o  [facility] on 7/5/2021 for <principal problem not specified>  Present to ED with complaints of fatigue, weakness and fall pta  Reports resolution of sx on evaluation  Does have hx of vertigo (takes meclizine) as well as balance issues  Use of AD prn at home given abovementioned factors  PT was consulted and pt was seen on 7/5/2021 for mobility assessment and d/c planning  Pt presents w multiple lines, low fall risk   Pt is currently functioning at a supervision assistance x1 level for bed mobility, transfers and ambulation w RW  No gross LOB but presents w gait deviations contributing to fall risk including slow gait speeds and decreased foot clearance  5xSTS score 29" indicating pt at risk for recurrent falls and would benefit from further fall risk assessment/ balance training  Pt will benefit from continued skilled IP PT to address the above mentioned impairments  in order to maximize recovery and increase functional independence when completing mobility and ADLs  At this time PT recommendations for d/c are home w OPPT for balance, falls risk  Barriers to Discharge None   Goals   Patient Goals go home   STG Expiration Date 07/19/21   Short Term Goal #1 1)  Pt will perform bed mobility with [place] demonstrating appropriate technique 100% of the time in order to improve function  2)  Perform all transfers with N[place]omi demonstrating safe and appropriate technique 100% of the time in order to improve ability to negotiate safely in home environment  3) Amb with least restrictive AD > 150'x1 with mod I in order to demonstrate ability to negotiate in home environment  4)  Improve overall strength and balance 1/2 grade in order to optimize ability to perform functional tasks and reduce fall risk  5) Increase activity tolerance to 45 minutes in order to improve endurance to functional tasks  6)  Negotiate stairs using most appropriate technique and S in order to be able to negotiate safely in home environment  7) PT for ongoing patient and family/caregiver education, DME needs and d/c planning in order to promote highest level of function in least restrictive environment  PT Treatment Day 0   Plan   Treatment/Interventions Functional transfer training;LE strengthening/ROM; Elevations; Therapeutic exercise; Endurance training;Patient/family training;Equipment eval/education; Bed mobility;Gait training; Compensatory technique education;Spoke to advanced practitioner   PT Frequency 2-3x/wk Recommendation   PT Discharge Recommendation Home with outpatient rehabilitation   PT - OK to Discharge Yes   Additional Comments The patient's AM-PAC Basic Mobility Inpatient Short Form Raw Score is 18, Standardized Score is 41 05  A standardized score less than 42 9 suggests the patient may benefit from discharge to post-acute rehabilitation services  Please also refer to the recommendation of the Physical Therapist for safe discharge planning  Current score reflects need for supervision for all tasks given fall risk/ for safety      AM-PAC Basic Mobility Inpatient   Turning in Bed Without Bedrails 3   Lying on Back to Sitting on Edge of Flat Bed 3   Moving Bed to Chair 3   Standing Up From Chair 3   Walk in Room 3   Climb 3-5 Stairs 3  (w railings)   Basic Mobility Inpatient Raw Score 18   Basic Mobility Standardized Score 41 05     History: co - morbidities, age, social background, fall risk, use of assistive device prn, assist for iadl's, multiple lines  Exam: impairments in systems including neuromuscular (balance, gait, transfers, motor function and sensation), am-pac, cardiopulmonary, cognition  Clinical: stable/unpredictable  Complexity:high      Siddhartha Pinto, PT

## 2021-07-05 NOTE — ED NOTES
Pt ambulated to bathroom with walker without assistance - pt reported no dizziness and no lightheadedness      Karolina Shirley RN  07/05/21 8247

## 2021-07-05 NOTE — ED ATTENDING ATTESTATION
7/5/2021  I, Margart Galeazzi, MD, saw and evaluated the patient  I have discussed the patient with the resident/non-physician practitioner and agree with the resident's/non-physician practitioner's findings, Plan of Care, and MDM as documented in the resident's/non-physician practitioner's note, except where noted  All available labs and Radiology studies were reviewed  I was present for key portions of any procedure(s) performed by the resident/non-physician practitioner and I was immediately available to provide assistance  At this point I agree with the current assessment done in the Emergency Department  I have conducted an independent evaluation of this patient a history and physical is as follows:    Final Diagnosis:  1  Dizziness    2  Physical deconditioning      Chief Complaint   Patient presents with    Dizziness     Patient reports feeling weak and dizzy since yesterday and today at 2 am was unable to make it to the bathroom so he laid down on floor and was unable to get up  Reports still feeling weak and dizzy also reports headache  Denies cp/sob  Took meclizine and feels like it made his dizziness worse  This is an 55-year-old male with history of diabetes, paroxysmal AFib, hyperlipidemia, CHF, CVA, hypertension who presents with weakness  Patient states that last evening, he started to feel dizzy described as vertiginous symptoms  He took a meclizine which improved his symptoms  However, after taking the meclizine, he started to feel diffusely weak  This morning, he was still feeling weak and was unable to get out of bed  However, patient was brought to the emergency department by private vehicle  Denies fever/chills, nausea/vomiting, lightheadedness/dizziness, numbness, headache, change in vision, URI symptoms, neck pain, chest pain, palpitations, shortness of breath, cough, back pain, flank pain, abdominal pain, diarrhea, hematochezia, melena, dysuria, hematuria        PMH:  - paroxysmal AFib, hypertension, hyperlipidemia, CHF, diabetes  PSH:  - not applicable    PE:   Vitals:    07/05/21 1115 07/05/21 1159 07/05/21 1215 07/05/21 1251   BP: 153/84 150/73 141/74 146/89   BP Location:  Right arm  Right arm   Pulse: 88 89 88 83   Resp: 16 16 16 16   Temp:       TempSrc:       SpO2: 95% 94% 95% 96%       Constitutional: Vital signs are normal  He appears well-developed  He is cooperative  No distress  HENT:   Mouth/Throat: Uvula is midline, oropharynx is clear and moist and mucous membranes are normal    Eyes: Pupil is equal, round, and reactive to light  Conjunctivae and EOM are normal   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  Neck: Trachea normal  No thyroid mass and no thyromegaly present  Cardiovascular: Normal rate, regular rhythm, normal heart sounds, intact distal pulses and normal pulses  No murmur heard  Pulmonary/Chest: Effort normal and breath sounds normal    Abdominal: Soft  Normal appearance and bowel sounds are normal  There is no tenderness  There is no rebound, no guarding and no CVA tenderness  Neurological: He is alert  Cranial nerves 2-12 intact, strength out of 5 throughout, sensation intact throughout  Skin: Skin is warm, dry and intact  Psychiatric: He has a normal mood and affect  His speech is normal and behavior is normal  Thought content normal          A:  - this is a chronically ill 60-year-old male who presents with diffuse weakness  P:  - will check labs, EKG, chest x-ray  Disposition pending re-evaluation and ambulatory test     - 13 point ROS was performed and all are normal unless stated in the history above  - Nursing note reviewed  Vitals reviewed  - Orders placed by myself and/or advanced practitioner / resident     - Previous chart was reviewed  - No language barrier    - History obtained from patient  - There are no limitations to the history obtained  - Critical care time: Not applicable for this patient            Medications acetaminophen (TYLENOL) tablet 975 mg (975 mg Oral Given 7/5/21 1045)     XR chest 1 view portable   ED Interpretation   No acute cardiopulmonary disease as interpreted by myself  Orders Placed This Encounter   Procedures    XR chest 1 view portable    Basic metabolic panel    CBC and differential    Troponin I    TSH    Urine Microscopic    Ambulatory referral to Physical Therapy    Urine dip analyzer    PT eval and treat    ECG 12 lead     Labs Reviewed   BASIC METABOLIC PANEL - Abnormal       Result Value Ref Range Status    Sodium 143  136 - 145 mmol/L Final    Potassium 3 9  3 5 - 5 3 mmol/L Final    Chloride 106  100 - 108 mmol/L Final    CO2 29  21 - 32 mmol/L Final    ANION GAP 8  4 - 13 mmol/L Final    BUN 13  5 - 25 mg/dL Final    Creatinine 1 20  0 60 - 1 30 mg/dL Final    Comment: Standardized to IDMS reference method    Glucose 159 (*) 65 - 140 mg/dL Final    Comment: If the patient is fasting, the ADA then defines impaired fasting glucose as > 100 mg/dL and diabetes as > or equal to 123 mg/dL  Specimen collection should occur prior to Sulfasalazine administration due to the potential for falsely depressed results  Specimen collection should occur prior to Sulfapyridine administration due to the potential for falsely elevated results      Calcium 8 4  8 3 - 10 1 mg/dL Final    eGFR 55  ml/min/1 73sq m Final    Narrative:     [place] guidelines for Chronic Kidney Disease (CKD):     Stage 1 with normal or high GFR (GFR > 90 mL/min/1 73 square meters)    Stage 2 Mild CKD (GFR = 60-89 mL/min/1 73 square meters)    Stage 3A Moderate CKD (GFR = 45-59 mL/min/1 73 square meters)    Stage 3B Moderate CKD (GFR = 30-44 mL/min/1 73 square meters)    Stage 4 Severe CKD (GFR = 15-29 mL/min/1 73 square meters)    Stage 5 End Stage CKD (GFR <15 mL/min/1 73 square meters)  Note: GFR calculation is accurate only with a steady state creatinine   CBC AND DIFFERENTIAL - Abnormal    WBC 6 33  4 31 - 10 16 Thousand/uL Final    RBC 4 34  3 88 - 5 62 Million/uL Final    Hemoglobin 13 5  12 0 - 17 0 g/dL Final    Hematocrit 40 1  36 5 - 49 3 % Final    MCV 92  82 - 98 fL Final    MCH 31 1  26 8 - 34 3 pg Final    MCHC 33 7  31 4 - 37 4 g/dL Final    RDW 12 6  11 6 - 15 1 % Final    MPV 10 6  8 9 - 12 7 fL Final    Platelets 828 (*) 585 - 390 Thousands/uL Final    nRBC 0  /100 WBCs Final    Neutrophils Relative 55  43 - 75 % Final    Immat GRANS % 1  0 - 2 % Final    Lymphocytes Relative 26  14 - 44 % Final    Monocytes Relative 17 (*) 4 - 12 % Final    Eosinophils Relative 1  0 - 6 % Final    Basophils Relative 0  0 - 1 % Final    Neutrophils Absolute 3 52  1 85 - 7 62 Thousands/µL Final    Immature Grans Absolute 0 03  0 00 - 0 20 Thousand/uL Final    Lymphocytes Absolute 1 62  0 60 - 4 47 Thousands/µL Final    Monocytes Absolute 1 08  0 17 - 1 22 Thousand/µL Final    Eosinophils Absolute 0 07  0 00 - 0 61 Thousand/µL Final    Basophils Absolute 0 01  0 00 - 0 10 Thousands/µL Final   URINE MICROSCOPIC - Abnormal    RBC, UA 0-1 (*) None Seen, 2-4 /hpf Final    WBC, UA None Seen  None Seen, 2-4, 5-60 /hpf Final    Epithelial Cells None Seen  None Seen, Occasional /hpf Final    Bacteria, UA None Seen  None Seen, Occasional /hpf Final   URINE MACROSCOPIC, POC - Abnormal    Color, UA Yellow   Final    Clarity, UA Clear   Final    pH, UA 6 0  4 5 - 8 0 Final    Leukocytes, UA Negative  Negative Final    Nitrite, UA Negative  Negative Final    Protein, UA Negative  Negative mg/dl Final    Glucose, UA Negative  Negative mg/dl Final    Ketones, UA Negative  Negative mg/dl Final    Urobilinogen, UA 0 2  0 2, 1 0 E U /dl E U /dl Final    Bilirubin, UA Negative  Negative Final    Blood, UA Small (*) Negative Final    Specific Jackson, UA 1 010  1 003 - 1 030 Final    Narrative:     CLINITEK RESULT   TROPONIN I - Normal    Troponin I <0 02  <=0 04 ng/mL Final    Comment: 3Autovalidation override  [employer] Chemistry analyzer 99% cutoff is > 0 04 ng/mL in network labs     o cTnI 99% cutoff is useful only when applied to patients in the clinical setting of myocardial ischemia   o cTnI 99% cutoff should be interpreted in the context of clinical history, ECG findings and possibly cardiac imaging to establish correct diagnosis  o cTnI 99% cutoff may be suggestive but clearly not indicative of a coronary event without the clinical setting of myocardial ischemia  TSH, 3RD GENERATION - Normal    TSH 3RD GENERATON 1 783  0 358 - 3 740 uIU/mL Final    Narrative:     Patients undergoing fluorescein dye angiography may retain small amounts of fluorescein in the body for 48-72 hours post procedure  Samples containing fluorescein can produce falsely depressed TSH values  If the patient had this procedure,a specimen should be resubmitted post fluorescein clearance  Time reflects when diagnosis was documented in both MDM as applicable and the Disposition within this note     Time User Action Codes Description Comment    7/5/2021 12:49 PM Latha Dickeyy Add [R42] Dizziness     7/5/2021 12:49 PM Latha Dickeyy Add [R53 81] Physical deconditioning       ED Disposition     ED Disposition Condition Date/Time Comment    Discharge Stable Mon Jul 5, 2021 12:49 PM Emily Garcia discharge to home/self care              Follow-up Information     Follow up With Specialties Details Why Contact Info Additional Information    [address] [facility] Physical Therapy Call   [facility], [address] [place] [address] [place]  , [place], [place], [address] Family Medicine Call   [address]  [place] [address]  [phone]           Patient's Medications   Discharge Prescriptions    No medications on file       Prior to Admission Medications   Prescriptions Last Dose Informant Patient Reported? Taking? ELIQUIS 5 MG   Yes No   Sig: Take 5 mg by mouth 2 (two) times a day   Lancets MISC  Self No No   Sig: To test BS once daily  Lumigan 0 01 % ophthalmic drops   Yes No   TOBRADEX ophthalmic ointment   Yes No   Sig: 3 (three) times a day   acetaminophen (TYLENOL) 325 mg tablet   Yes No   Sig: every 6 (six) hours   atorvastatin (LIPITOR) 40 mg tablet  Self No No   Sig: Take 1 tablet (40 mg total) by mouth daily at bedtime   cyclopentolate (CYCLOGYL) 1 % ophthalmic solution   Yes No   Sig: instill 1 drop into right eye twice a day   fluticasone (FLONASE) 50 mcg/act nasal spray  Self Yes No   Si sprays into each nostril daily   glucose blood (Wilmer Contour Test) test strip   No No   Sig: Test Once Daily   lisinopril (ZESTRIL) 10 mg tablet   Yes No   Sig: Take 10 mg by mouth daily   meclizine (ANTIVERT) 25 mg tablet   No No   Sig: Take 1 tablet (25 mg total) by mouth every 8 (eight) hours as needed for dizziness   metFORMIN (GLUCOPHAGE) 500 mg tablet   No No   Sig: Take 1 tablet (500 mg total) by mouth every 12 (twelve) hours   methylPREDNISolone 4 MG tablet therapy pack   No No   Sig: Use as directed on package   metoprolol succinate (TOPROL-XL) 100 mg 24 hr tablet   No No   Sig: Take 1 tablet (100 mg total) by mouth daily   mirtazapine (REMERON) 15 mg tablet   Yes No   Sig: Take 7 5 mg by mouth daily   nitroglycerin (NITROSTAT) 0 4 mg SL tablet   Yes No   Sig: Place 0 4 mg under the tongue as needed      Facility-Administered Medications: None       Portions of the record may have been created with voice recognition software  Occasional wrong word or "sound a like" substitutions may have occurred due to the inherent limitations of voice recognition software  Read the chart carefully and recognize, using context, where substitutions have occurred        ED Course         Critical Care Time  Procedures

## 2021-07-05 NOTE — PLAN OF CARE
Problem: PHYSICAL THERAPY ADULT  Goal: Performs mobility at highest level of function for planned discharge setting  See evaluation for individualized goals  Description: Treatment/Interventions: Functional transfer training, LE strengthening/ROM, Elevations, Therapeutic exercise, Endurance training, Patient/family training, Equipment eval/education, Bed mobility, Gait training, Compensatory technique education, Spoke to advanced practitioner          See flowsheet documentation for full assessment, interventions and recommendations  Note: Prognosis: Good  Problem List: Impaired balance  Assessment: Daniel Smith is a 81 y o  male admitted [facility] on 7/5/2021 for <principal problem not specified>  Present to ED with complaints of fatigue, weakness and fall pta  Reports resolution of sx on evaluation  Does have hx of vertigo (takes meclizine) as well as balance issues  Use of AD prn at home given abovementioned factors  PT was consulted and pt was seen on 7/5/2021 for mobility assessment and d/c planning  Pt presents w multiple lines, low fall risk  Pt is currently functioning at a supervision assistance x1 level for bed mobility, transfers and ambulation w RW  No gross LOB but presents w gait deviations contributing to fall risk including slow gait speeds and decreased foot clearance  5xSTS score 29" indicating pt at risk for recurrent falls and would benefit from further fall risk assessment/ balance training  Pt will benefit from continued skilled IP PT to address the above mentioned impairments  in order to maximize recovery and increase functional independence when completing mobility and ADLs  At this time PT recommendations for d/c are home w OPPT for balance, falls risk  Barriers to Discharge: None        PT Discharge Recommendation: Home with outpatient rehabilitation     PT - OK to Discharge: Yes    See flowsheet documentation for full assessment

## 2021-07-05 NOTE — PROGRESS NOTES
[address]        NAME: Frances Villavicencio is a 81 y o  male  : 1935    MRN: [id number]  DATE: 2021  TIME: 8:49 AM    Assessment and Plan   Fatigue, unspecified type [R53 [id number]]  1  Fatigue, unspecified type  Transfer to other facility     Due to pt age, sx, and significant PMH, pt sent to ED immediately for further evaluation  Physical exam largely unremarkable at this time  Pt would like to go with his son to [facility] ED  I educated them to go immediately there and enter through the ED entrance  Patient Instructions       Go to ER for further evaluation  Chief Complaint     Chief Complaint   Patient presents with    Dizziness     Patient c/o dizziness  History of Present Illness       Fatigue  This is a new problem  The current episode started yesterday (Last night pt felt dizzy  He has been getting dizzy spells so he took his meclazine  At around 2 AM his neighbor came over and found him on the floor  Pt went to bed and this morning he was so tired he couldnt get out of bed)  The problem occurs constantly  The problem has been gradually improving  Associated symptoms include fatigue and weakness  Pertinent negatives include no abdominal pain, arthralgias, chest pain, chills, congestion, diaphoresis, fever, headaches, myalgias, nausea, neck pain, numbness, rash, sore throat or vomiting  Nothing aggravates the symptoms  Treatments tried: meclazine last night  Nothing today  Review of Systems   Review of Systems   Constitutional: Positive for fatigue  Negative for chills, diaphoresis and fever  HENT: Negative for congestion, postnasal drip, sinus pressure, sore throat and trouble swallowing  Eyes: Negative  Respiratory: Negative for chest tightness, shortness of breath and wheezing  Cardiovascular: Negative  Negative for chest pain and palpitations  Gastrointestinal: Negative for abdominal pain, diarrhea, nausea and vomiting     Endocrine: Negative  Genitourinary: Negative for dysuria  Musculoskeletal: Negative  Negative for arthralgias, back pain, myalgias, neck pain and neck stiffness  Skin: Negative for pallor and rash  Allergic/Immunologic: Negative  Neurological: Positive for dizziness and weakness  Negative for syncope, light-headedness, numbness and headaches  Hematological: Negative  Psychiatric/Behavioral: Negative  Current Medications       Current Outpatient Medications:     acetaminophen (TYLENOL) 325 mg tablet, every 6 (six) hours, Disp: , Rfl:     atorvastatin (LIPITOR) 40 mg tablet, Take 1 tablet (40 mg total) by mouth daily at bedtime, Disp: 30 tablet, Rfl: 3    cyclopentolate (CYCLOGYL) 1 % ophthalmic solution, instill 1 drop into right eye twice a day, Disp: , Rfl:     ELIQUIS 5 MG, Take 5 mg by mouth 2 (two) times a day, Disp: , Rfl:     fluticasone (FLONASE) 50 mcg/act nasal spray, 2 sprays into each nostril daily, Disp: , Rfl:     glucose blood (Wilmer Contour Test) test strip, Test Once Daily, Disp: 100 each, Rfl: 1    Lancets MISC, To test BS once daily  , Disp: 100 each, Rfl: 3    lisinopril (ZESTRIL) 10 mg tablet, Take 10 mg by mouth daily, Disp: , Rfl:     Lumigan 0 01 % ophthalmic drops, , Disp: , Rfl:     meclizine (ANTIVERT) 25 mg tablet, Take 1 tablet (25 mg total) by mouth every 8 (eight) hours as needed for dizziness, Disp: 30 tablet, Rfl: 0    metFORMIN (GLUCOPHAGE) 500 mg tablet, Take 1 tablet (500 mg total) by mouth every 12 (twelve) hours, Disp: 180 tablet, Rfl: 1    methylPREDNISolone 4 MG tablet therapy pack, Use as directed on package, Disp: 21 each, Rfl: 0    metoprolol succinate (TOPROL-XL) 100 mg 24 hr tablet, Take 1 tablet (100 mg total) by mouth daily, Disp: 90 tablet, Rfl: 1    mirtazapine (REMERON) 15 mg tablet, Take 7 5 mg by mouth daily, Disp: , Rfl:     nitroglycerin (NITROSTAT) 0 4 mg SL tablet, Place 0 4 mg under the tongue as needed, Disp: , Rfl:     TOBRADEX ophthalmic ointment, 3 (three) times a day, Disp: , Rfl:     Current Allergies     Allergies as of 07/05/2021    (No Known Allergies)            The following portions of the patient's history were reviewed and updated as appropriate: allergies, current medications, past family history, past medical history, past social history, past surgical history and problem list      Past Medical History:   Diagnosis Date    [facility]21/2019    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 MRI small bleed CT 8-19 no bleed No neurology follow up LINQ 6-19 PAF seen 11-19   Last Assessment & Plan:  Good recovery CVA- recent LINQ trasmission showed PAF: Would like to start Eliquis but because had CNS bleed needs neurology evaluation make sure ok  No change meds   NICOLE [facility]) [facility] 4/9/2020    Colon cancer ([facility] )     Vitreous hemorrhage of right eye ([facility] ) 7/19/2016       No past surgical history on file  Family History   Family history unknown: Yes         Medications have been verified  Objective   /67   Pulse 95   Temp 98 9 °F (37 2 °C)   Resp 18   Wt 77 6 kg (171 lb)   SpO2 98%   BMI 28 46 kg/m²        Physical Exam     Physical Exam  Vitals and nursing note reviewed  Constitutional:       General: He is not in acute distress  Appearance: Normal appearance  He is well-developed  He is not diaphoretic  HENT:      Head: Normocephalic and atraumatic  Right Ear: Tympanic membrane, ear canal and external ear normal       Left Ear: Tympanic membrane, ear canal and external ear normal    Eyes:      General:         Right eye: No discharge  Left eye: No discharge  Extraocular Movements: Extraocular movements intact  Conjunctiva/sclera: Conjunctivae normal    Cardiovascular:      Rate and Rhythm: Normal rate and regular rhythm  Heart sounds: Normal heart sounds  Pulmonary:      Effort: Pulmonary effort is normal  No respiratory distress  Breath sounds: Normal breath sounds  No wheezing, rhonchi or rales  Musculoskeletal:      Cervical back: Normal range of motion and neck supple  Lymphadenopathy:      Cervical: No cervical adenopathy  Skin:     General: Skin is warm  Capillary Refill: Capillary refill takes less than 2 seconds  Coloration: Skin is not pale  Findings: No rash  Neurological:      General: No focal deficit present  Mental Status: He is alert and oriented to person, place, and time  GCS: GCS eye subscore is 4  GCS verbal subscore is 5  GCS motor subscore is 6  Cranial Nerves: Cranial nerves are intact  No cranial nerve deficit or dysarthria  Sensory: Sensation is intact  Motor: Motor function is intact  No weakness, tremor, abnormal muscle tone, seizure activity or pronator drift  Coordination: Coordination is intact  Romberg sign negative  Coordination normal  Finger-Nose-Finger Test and Heel to [facility] Test normal       Gait: Gait is intact

## 2021-07-05 NOTE — DISCHARGE INSTRUCTIONS
Patient Instructions: Today you were seen in the emergency department for dizziness and deconditioning  We reviewed your EKG and your labs and determined that you would be able to be discharged  You should follow up with your primary care doctor and physical therapy  Please return to the emergency department if your symptoms get worse, you develop a fever, or you have any other symptoms we discussed today prior to discharge  Nice to meet you! Best of luck with everything!

## 2021-07-06 LAB
ATRIAL RATE: 97 BPM
P AXIS: 43 DEGREES
PR INTERVAL: 218 MS
QRS AXIS: -15 DEGREES
QRSD INTERVAL: 74 MS
QT INTERVAL: 336 MS
QTC INTERVAL: 426 MS
T WAVE AXIS: 10 DEGREES
VENTRICULAR RATE: 97 BPM

## 2021-07-06 PROCEDURE — 93010 ELECTROCARDIOGRAM REPORT: CPT | Performed by: INTERNAL MEDICINE

## 2021-07-10 NOTE — ED PROVIDER NOTES
Final Diagnosis:  1  Dizziness    2  Physical deconditioning      ED Course as of Jul 10 0859   Mon Jul 05, 2021   3688 Procedure Note: EKG  Date/Time: 07/05/21 9:34 AM   Interpreted by: Pepe Bernal  Indications / Diagnosis: dizziness (more like vertigo than syncope according to patient)  ECG reviewed by me, the ED Provider: yes   The EKG demonstrates:  Rhythm: normal sinus  Intervals: normal intervals  Axis: normal axis  QRS/Blocks: normal QRS  ST Changes: No acute ST Changes, no STD/MIGUEL           1011 Troponin I: <0 02   1011 Hemoglobin: 13 5   1053 Physical therapy to come evaluate the patient for discharge        Chief Complaint   Patient presents with    Dizziness     Patient reports feeling weak and dizzy since yesterday and today at 2 am was unable to make it to the bathroom so he laid down on floor and was unable to get up  Reports still feeling weak and dizzy also reports headache  Denies cp/sob  Took meclizine and feels like it made his dizziness worse  ASSESSMENT + PLAN:   - Nursing note reviewed  1  Dizziness  -mild dizziness last night, that has resolved at this point  -EKG, cardiac workup    2  Weakness  -patient complaining of global weakness, no focal area and no concerning signs on examination for stroke  -will evaluate with some labs and reassess  -evaluate for ambulatory dysfunction  -formal evaluation was physical therapy after labs came back reassuring  -patient okay for home discharge (PT recommended a referral)      Final Dispo   Patient was reassessed  Vital signs stable  Patient and/or family given discharge instructions and return precautions  Patient and/or family was reassured  The patient and/or family vocalizes understanding  Answered all of the patient's and/or family's questions  Will follow up with PT and PCP  Patient and/or family are agreeable to the plan          Medications   acetaminophen (TYLENOL) tablet 975 mg (975 mg Oral Given 7/5/21 1045)     Time reflects when diagnosis was documented in both MDM as applicable and the Disposition within this note     Time User Action Codes Description Comment    7/5/2021 12:49 PM Verneita Moles Add [R42] Dizziness     7/5/2021 12:49 PM Verneita Moles Add [R53 81] Physical deconditioning       ED Disposition     ED Disposition Condition Date/Time Comment    Discharge Stable Mon Jul 5, 2021 12:49 PM Angelica Tolentino discharge to home/self care  Follow-up Information     Follow up With Specialties Details Why Contact Info Additional Information    [id number] [facility] Physical Therapy Call   [facility], [address] [place] [address] , [place], [place], [address] [facility], [address], DO Family Medicine Call   [address] [facility] [address] [place] [address]  [phone]           Discharge Medication List as of 7/5/2021 12:51 PM      CONTINUE these medications which have NOT CHANGED    Details   acetaminophen (TYLENOL) 325 mg tablet every 6 (six) hours, Historical Med      atorvastatin (LIPITOR) 40 mg tablet Take 1 tablet (40 mg total) by mouth daily at bedtime, Starting Mon 7/15/2019, Normal      cyclopentolate (CYCLOGYL) 1 % ophthalmic solution instill 1 drop into right eye twice a day, Historical Med      ELIQUIS 5 MG Take 5 mg by mouth 2 (two) times a day, Starting Wed 4/22/2020, Historical Med      fluticasone (FLONASE) 50 mcg/act nasal spray 2 sprays into each nostril daily, Starting Wed 8/9/2017, Historical Med      glucose blood ([place] Contour Test) test strip Test Once Daily, Normal      Lancets MISC To test BS once daily  , Normal      lisinopril (ZESTRIL) 10 mg tablet Take 10 mg by mouth daily, Starting Wed 4/22/2020, Historical Med      Lumigan 0 01 % ophthalmic drops Starting Thu 2/11/2021, Historical Med      meclizine (ANTIVERT) 25 mg tablet Take 1 tablet (25 mg total) by mouth every 8 (eight) hours as needed for dizziness, Starting Thu 6/10/2021, Normal      metFORMIN (GLUCOPHAGE) 500 mg tablet Take 1 tablet (500 mg total) by mouth every 12 (twelve) hours, Starting 2021, Normal      methylPREDNISolone 4 MG tablet therapy pack Use as directed on package, Normal      metoprolol succinate (TOPROL-XL) 100 mg 24 hr tablet Take 1 tablet (100 mg total) by mouth daily, Starting 2021, Normal      mirtazapine (REMERON) 15 mg tablet Take 7 5 mg by mouth daily, Starting 2020, Historical Med      nitroglycerin (NITROSTAT) 0 4 mg SL tablet Place 0 4 mg under the tongue as needed, Starting Tue 12/3/2019, Until 2021, Historical Med      TOBRADEX ophthalmic ointment 3 (three) times a day, Starting 2020, Historical Med             Prior to Admission Medications   Prescriptions Last Dose Informant Patient Reported? Taking? ELIQUIS 5 MG   Yes No   Sig: Take 5 mg by mouth 2 (two) times a day   Lancets MISC  Self No No   Sig: To test BS once daily     Lumigan 0 01 % ophthalmic drops   Yes No   TOBRADEX ophthalmic ointment   Yes No   Sig: 3 (three) times a day   acetaminophen (TYLENOL) 325 mg tablet   Yes No   Sig: every 6 (six) hours   atorvastatin (LIPITOR) 40 mg tablet  Self No No   Sig: Take 1 tablet (40 mg total) by mouth daily at bedtime   cyclopentolate (CYCLOGYL) 1 % ophthalmic solution   Yes No   Sig: instill 1 drop into right eye twice a day   fluticasone (FLONASE) 50 mcg/act nasal spray  Self Yes No   Si sprays into each nostril daily   glucose blood ([place] Contour Test) test strip   No No   Sig: Test Once Daily   lisinopril (ZESTRIL) 10 mg tablet   Yes No   Sig: Take 10 mg by mouth daily   meclizine (ANTIVERT) 25 mg tablet   No No   Sig: Take 1 tablet (25 mg total) by mouth every 8 (eight) hours as needed for dizziness   metFORMIN (GLUCOPHAGE) 500 mg tablet   No No   Sig: Take 1 tablet (500 mg total) by mouth every 12 (twelve) hours   methylPREDNISolone 4 MG tablet therapy pack No No   Sig: Use as directed on package   metoprolol succinate (TOPROL-XL) 100 mg 24 hr tablet   No No   Sig: Take 1 tablet (100 mg total) by mouth daily   mirtazapine (REMERON) 15 mg tablet   Yes No   Sig: Take 7 5 mg by mouth daily   nitroglycerin (NITROSTAT) 0 4 mg SL tablet   Yes No   Sig: Place 0 4 mg under the tongue as needed      Facility-Administered Medications: None       History of Present Illness: This is a 81 y o  male presenting today for evaluation of dizziness and now focal weakness  Patient believes that the dizziness was related to his vertigo  Patient has history of stroke, paroxysmal AFib, diabetes, kidney disease and says that last night he started to have some dizziness  He says that he took his meclizine  This morning he woke up and said that he was globally weak  No focal deficits  Patient denies any changes with his speech or his vision  He says that he just cannot really get up  His son is with him and says that this that happened a few years ago and did not exactly determine what happened  Patient also had a history of a stroke that did not leave the patient with any residual deficits  No fever, chills, nausea, vomiting, diarrhea, chest pain, shortness of breath, abdominal pain, headaches  Patient does not currently have dizziness now  - No language barrier    - History obtained from patient and chart and son   - There are no limitations to the history obtained  - Previous charting was reviewed  Some data reviewed included below for ease of access whether or not it is relevant to this patient encounter  Past Medical, Past Surgical History:    has a past medical history of Acute ischemic left MCA stroke ([facility] ) (6/21/2019), NICOLE (acute kidney injury) ([facility] ) (4/9/2020), Colon cancer ([facility] ), and Vitreous hemorrhage of right eye ([facility] ) (7/19/2016)  has no past surgical history on file       Allergies:   No Known Allergies    Social and Family History:     Social History Substance and Sexual Activity   Alcohol Use Yes     Social History     Tobacco Use   Smoking Status Former Smoker   Smokeless Tobacco Never Used   Tobacco Comment    quit 30 years ago (as of 12/2018)     Social History     Substance and Sexual Activity   Drug Use No       Review of Systems:   Review of Systems   Constitutional: Positive for fatigue  Negative for chills, diaphoresis and fever  HENT: Negative  Eyes: Negative  Negative for visual disturbance  Respiratory: Negative  Negative for shortness of breath  Cardiovascular: Negative  Negative for chest pain  Gastrointestinal: Negative  Negative for abdominal pain, nausea and vomiting  Endocrine: Negative  Genitourinary: Negative  Negative for dysuria  Musculoskeletal: Negative  Negative for myalgias  Skin: Negative  Negative for rash  Allergic/Immunologic: Negative  Neurological: Positive for dizziness and weakness  Negative for light-headedness, numbness and headaches  Hematological: Negative  Psychiatric/Behavioral: Negative  All other systems reviewed and are negative  Physical Examination     Vitals:    07/05/21 1115 07/05/21 1159 07/05/21 1215 07/05/21 1251   BP: 153/84 150/73 141/74 146/89   BP Location:  Right arm  Right arm   Pulse: 88 89 88 83   Resp: 16 16 16 16   Temp:       TempSrc:       SpO2: 95% 94% 95% 96%     Vitals reviewed by me  Physical Exam  Vitals and nursing note reviewed  Constitutional:       General: He is not in acute distress  Appearance: He is well-developed  He is not ill-appearing  Comments: Appears stated age   HENT:      Head: Normocephalic and atraumatic  Right Ear: External ear normal       Left Ear: External ear normal       Nose: No congestion  Mouth/Throat:      Pharynx: No oropharyngeal exudate  Eyes:      General: No scleral icterus  Extraocular Movements: Extraocular movements intact        Conjunctiva/sclera: Conjunctivae normal       Pupils: Pupils are equal, round, and reactive to light  Cardiovascular:      Rate and Rhythm: Normal rate and regular rhythm  Pulmonary:      Effort: Pulmonary effort is normal       Breath sounds: Normal breath sounds  Abdominal:      General: There is no distension  Palpations: Abdomen is soft  Tenderness: There is no abdominal tenderness  Musculoskeletal:         General: Normal range of motion  Cervical back: Normal range of motion and neck supple  Skin:     General: Skin is warm and dry  Neurological:      General: No focal deficit present  Mental Status: He is alert and oriented to person, place, and time  Mental status is at baseline  Cranial Nerves: No cranial nerve deficit  Sensory: No sensory deficit  Motor: No weakness or abnormal muscle tone  Coordination: Coordination normal       Comments: Grossly neuro intact; Able to move all extremities   Psychiatric:         Mood and Affect: Mood normal                            ED Course as of Jul 10 0859   Mon Jul 05, 2021   0444 Procedure Note: EKG  Date/Time: 07/05/21 9:34 AM   Interpreted by: India Headings  Indications / Diagnosis: dizziness (more like vertigo than syncope according to patient)  ECG reviewed by me, the ED Provider: yes   The EKG demonstrates:  Rhythm: normal sinus  Intervals: normal intervals  Axis: normal axis  QRS/Blocks: normal QRS  ST Changes: No acute ST Changes, no STD/MIGUEL           1011 Troponin I: <0 02   1011 Hemoglobin: 13 5   1053 Physical therapy to come evaluate the patient for discharge        XR chest 1 view portable   ED Interpretation   No acute cardiopulmonary disease as interpreted by myself  Final Result      No acute cardiopulmonary disease  Mild reticulation in the lateral left lung which could be due to mild fibrosis           Workstation performed: [id number]           Orders Placed This Encounter   Procedures    XR chest 1 view portable    Basic metabolic panel    CBC and differential    Troponin I    TSH    Urine Microscopic    Ambulatory referral to Physical Therapy    EKG RESULTS    ECG 12 lead    ECG 12 lead       Labs:     Labs Reviewed   BASIC METABOLIC PANEL - Abnormal       Result Value Ref Range Status    Sodium 143  136 - 145 mmol/L Final    Potassium 3 9  3 5 - 5 3 mmol/L Final    Chloride 106  100 - 108 mmol/L Final    CO2 29  21 - 32 mmol/L Final    ANION GAP 8  4 - 13 mmol/L Final    BUN 13  5 - 25 mg/dL Final    Creatinine 1 20  0 60 - 1 30 mg/dL Final    Comment: Standardized to IDMS reference method    Glucose 159 (*) 65 - 140 mg/dL Final    Comment: If the patient is fasting, the ADA then defines impaired fasting glucose as > 100 mg/dL and diabetes as > or equal to 123 mg/dL  Specimen collection should occur prior to Sulfasalazine administration due to the potential for falsely depressed results  Specimen collection should occur prior to Sulfapyridine administration due to the potential for falsely elevated results      Calcium 8 4  8 3 - 10 1 mg/dL Final    eGFR 55  ml/min/1 73sq m Final    Narrative:     [place] guidelines for Chronic Kidney Disease (CKD):     Stage 1 with normal or high GFR (GFR > 90 mL/min/1 73 square meters)    Stage 2 Mild CKD (GFR = 60-89 mL/min/1 73 square meters)    Stage 3A Moderate CKD (GFR = 45-59 mL/min/1 73 square meters)    Stage 3B Moderate CKD (GFR = 30-44 mL/min/1 73 square meters)    Stage 4 Severe CKD (GFR = 15-29 mL/min/1 73 square meters)    Stage 5 End Stage CKD (GFR <15 mL/min/1 73 square meters)  Note: GFR calculation is accurate only with a steady state creatinine   CBC AND DIFFERENTIAL - Abnormal    WBC 6 33  4 31 - 10 16 Thousand/uL Final    RBC 4 34  3 88 - 5 62 Million/uL Final    Hemoglobin 13 5  12 0 - 17 0 g/dL Final    Hematocrit 40 1  36 5 - 49 3 % Final    MCV 92  82 - 98 fL Final    MCH 31 1  26 8 - 34 3 pg Final    MCHC 33 7  31 4 - 37 4 g/dL Final    RDW 12 6 11 6 - 15 1 % Final    MPV 10 6  8 9 - 12 7 fL Final    Platelets 508 (*) 279 - 390 Thousands/uL Final    nRBC 0  /100 WBCs Final    Neutrophils Relative 55  43 - 75 % Final    Immat GRANS % 1  0 - 2 % Final    Lymphocytes Relative 26  14 - 44 % Final    Monocytes Relative 17 (*) 4 - 12 % Final    Eosinophils Relative 1  0 - 6 % Final    Basophils Relative 0  0 - 1 % Final    Neutrophils Absolute 3 52  1 85 - 7 62 Thousands/µL Final    Immature Grans Absolute 0 03  0 00 - 0 20 Thousand/uL Final    Lymphocytes Absolute 1 62  0 60 - 4 47 Thousands/µL Final    Monocytes Absolute 1 08  0 17 - 1 22 Thousand/µL Final    Eosinophils Absolute 0 07  0 00 - 0 61 Thousand/µL Final    Basophils Absolute 0 01  0 00 - 0 10 Thousands/µL Final   URINE MICROSCOPIC - Abnormal    RBC, UA 0-1 (*) None Seen, 2-4 /hpf Final    WBC, UA None Seen  None Seen, 2-4, 5-60 /hpf Final    Epithelial Cells None Seen  None Seen, Occasional /hpf Final    Bacteria, UA None Seen  None Seen, Occasional /hpf Final   URINE MACROSCOPIC, POC - Abnormal    Color, UA Yellow   Final    Clarity, UA Clear   Final    pH, UA 6 0  4 5 - 8 0 Final    Leukocytes, UA Negative  Negative Final    Nitrite, UA Negative  Negative Final    Protein, UA Negative  Negative mg/dl Final    Glucose, UA Negative  Negative mg/dl Final    Ketones, UA Negative  Negative mg/dl Final    Urobilinogen, UA 0 2  0 2, 1 0 E U /dl E U /dl Final    Bilirubin, UA Negative  Negative Final    Blood, UA Small (*) Negative Final    Specific [place], UA 1 010  1 003 - 1 030 Final    Narrative:     CLINITEK RESULT   TROPONIN I - Normal    Troponin I <0 02  <=0 04 ng/mL Final    Comment: 3Autovalidation override  [employer] Chemistry analyzer 99% cutoff is > 0 04 ng/mL in network labs     o cTnI 99% cutoff is useful only when applied to patients in the clinical setting of myocardial ischemia   o cTnI 99% cutoff should be interpreted in the context of clinical history, ECG findings and possibly cardiac imaging to establish correct diagnosis  o cTnI 99% cutoff may be suggestive but clearly not indicative of a coronary event without the clinical setting of myocardial ischemia  TSH, 3RD GENERATION - Normal    TSH 3RD [facility] 1 783  0 358 - 3 740 uIU/mL Final    Narrative:     Patients undergoing fluorescein dye angiography may retain small amounts of fluorescein in the body for 48-72 hours post procedure  Samples containing fluorescein can produce falsely depressed TSH values  If the patient had this procedure,a specimen should be resubmitted post fluorescein clearance  Imaging:   XR chest 1 view portable    Result Date: 7/5/2021  Narrative: CHEST INDICATION:   dizziness  COMPARISON:  None  EXAM PERFORMED/VIEWS:  XR CHEST PORTABLE  FINDINGS: Cardiomediastinal silhouette normal  Low lung volumes with vascular crowding  Mild reticulation in the peripheral left lung  No effusion or pneumothorax  Osseous structures normal for age  Impression: No acute cardiopulmonary disease  Mild reticulation in the lateral left lung which could be due to mild fibrosis  Workstation performed: [id number]        Final Diagnosis:  1  Dizziness    2  Physical deconditioning        Code Status: No Order    Portions of the record may have been created with voice recognition software  Occasional wrong word or "sound a like" substitutions may have occurred due to the inherent limitations of voice recognition software  Read the chart carefully and recognize, using context, where substitutions have occurred      Electronically signed by:  Caren German, PGY 3, MD Dulce Wetzel MD  07/10/21 4400

## 2021-07-27 ENCOUNTER — EVALUATION (OUTPATIENT)
Dept: PHYSICAL THERAPY | Facility: REHABILITATION | Age: 86
End: 2021-07-27
Payer: MEDICARE

## 2021-07-27 DIAGNOSIS — R26.9 GAIT DISTURBANCE: ICD-10-CM

## 2021-07-27 DIAGNOSIS — R53.81 PHYSICAL DECONDITIONING: Primary | ICD-10-CM

## 2021-07-27 DIAGNOSIS — R26.89 BALANCE DISORDER: ICD-10-CM

## 2021-07-27 PROCEDURE — 97163 PT EVAL HIGH COMPLEX 45 MIN: CPT

## 2021-07-27 PROCEDURE — 97112 NEUROMUSCULAR REEDUCATION: CPT

## 2021-07-27 NOTE — PROGRESS NOTES
PT Evaluation     Name: Germain Rosario  Date: 21  : 1935  Referring Provider: Dayan Mcgee MD  AUTHORIZATION:   Insurance: Payor: Juwan Krishnams / Plan: MEDICARE A AND B / Product Type: Medicare A & B Fee for Service /    24 visits through 10/21/21, PN due 21      SUBJECTIVE:  HPI: Germain Rosario is a 81 y o  male referred to outpatient physical therapy for the following diagnosis   Encounter Diagnoses   Name Primary?  Physical deconditioning     Gait disturbance Yes    Balance disorder           Patient reports lives with his wife  Has a one level home with basement parking but he jernigan in front of the house and enters the side porch  Small step to enter  Falls Hx: loss his balance outdoors in the snow, required assistance to get up  He fell off the side porch because I loss my balance  I can't walk on the grass when it is too tall, because I can't lift my feet  Patient goals    Patient-Specific Functional Scale   Task is scored 0 (unable to perform activity) to 10 (ability to perform activity independently)  Activity      1  I want to be able to park in the garage 5    2  I want to be able to stand up straight and not lean forwards 5    3  Picking up object from the floor 5    4  Walk on my property (grass) without falling  4        Past Medical History:   Diagnosis Date    Acute ischemic left MCA stroke ([facility] [address] ) 2019    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 MRI small bleed CT - no bleed No neurology follow up LINQ 6-19 PAF seen    Last Assessment & Plan:  Good recovery CVA- recent LINQ trasmission showed PAF: Would like to start Eliquis but because had CNS bleed needs neurology evaluation make sure ok  No change meds      NICOLE (acute kidney injury) ([facility] [address] ) 2020    Colon cancer ([facility] [address] )     Vitreous hemorrhage of right eye ([facility] [address] ) 2016       Current Outpatient Medications:     acetaminophen (TYLENOL) 325 mg tablet, every 6 (six) hours, Disp: , Rfl:   atorvastatin (LIPITOR) 40 mg tablet, Take 1 tablet (40 mg total) by mouth daily at bedtime, Disp: 30 tablet, Rfl: 3    cyclopentolate (CYCLOGYL) 1 % ophthalmic solution, instill 1 drop into right eye twice a day, Disp: , Rfl:     ELIQUIS 5 MG, Take 5 mg by mouth 2 (two) times a day, Disp: , Rfl:     fluticasone (FLONASE) 50 mcg/act nasal spray, 2 sprays into each nostril daily, Disp: , Rfl:     glucose blood (Wilmer Contour Test) test strip, Test Once Daily, Disp: 100 each, Rfl: 1    Lancets MISC, To test BS once daily  , Disp: 100 each, Rfl: 3    lisinopril (ZESTRIL) 10 mg tablet, Take 10 mg by mouth daily, Disp: , Rfl:     Lumigan 0 01 % ophthalmic drops, , Disp: , Rfl:     meclizine (ANTIVERT) 25 mg tablet, Take 1 tablet (25 mg total) by mouth every 8 (eight) hours as needed for dizziness, Disp: 30 tablet, Rfl: 0    metFORMIN (GLUCOPHAGE) 500 mg tablet, Take 1 tablet (500 mg total) by mouth every 12 (twelve) hours, Disp: 180 tablet, Rfl: 1    methylPREDNISolone 4 MG tablet therapy pack, Use as directed on package, Disp: 21 each, Rfl: 0    metoprolol succinate (TOPROL-XL) 100 mg 24 hr tablet, Take 1 tablet (100 mg total) by mouth daily, Disp: 90 tablet, Rfl: 1    mirtazapine (REMERON) 15 mg tablet, Take 7 5 mg by mouth daily, Disp: , Rfl:     nitroglycerin (NITROSTAT) 0 4 mg SL tablet, Place 0 4 mg under the tongue as needed, Disp: , Rfl:     TOBRADEX ophthalmic ointment, 3 (three) times a day, Disp: , Rfl:   7/27/2021      Objective     Vital Signs:  /72  HR 61  SpO2 95%    Coordination Left Right   Heel To Shin Limited by decrease ROM  decreased ROM     Finger To Nose  normal normal   Rapid Alternating Movement [facility] WFL       Sensation Left Right   Kinesthesia     Light Touch     Sharp/Dull     2 Point Discrimination         AROM/MSShoulder Left  [facility] Right  [facility]   Elbow [facility] WFL   Hand  [facility]       AROM Left   Right     Flexion 85 85   Extension 0 0   Abduction 35 35   External Rotation 25 25         AROM  Knee Left Right   Flexion  [facility] WFL   Extension [facility] WFL     AROM  Ankle Left Right   Doriflexion  0 0   Plantarflexion 20 20       Manual Muscle Testing - Hip Left Right   Flexion 4 4   Extension 3+ 3+   Abduction 3+ 3+   External Rotation 3- 3-     Manual Muscle Testing - Knee Left Right   Flexion 3+ 3+   Extension 4 4     Manual Muscle Testing - Ankle Left Right   Doriflexion 3- 3-   Plantarflexion 3+ 3+     Core Movement Tasks Description of task    Sitting Abnormal - posterior pelvic tilt, forward head   Sitting to Standing Posterior losses of balance, Unable without use of arms and Poor control in descent   Standing Other: increased base of support, trunk forward flexed   Gait Assessment Decreased foot clearance, Lacks heel strike, Lacks knee extension during stance phase gait, Decreased hip extension, Decreased trunk rotation and Decreased arm swing left>right   Step-up Requires use of railing(s) and Abnormal foot placement, frwrd trunk lean   Functional Reach   5"          Outcome Measures    6 Minute Walk Test (ft): 320' stopped walking at 3' 48"   94 SPO2 85HR   rpe 8 reports 3/10   2 Minute Walk Test (ft): 167'   10MWT   67 m/s   5x Sit To Stand (s):   49 sec   TU sec without device       MCTSIB Sway Index   Feet Together, Eyes Open  30 sec   Feet Together, Eyes Closed  24 sec  * "I start getting weak   Feet Together, Eyes Open Foam  0   Feet Together, Eyes Closed Foam  0     Functional Gait Assessment: Held to do fatigue        Assessment/Plan    Assessment:  Patient was referred to outpatient physical therapy with diagnosis of physical deconditioning  He reports since covid-19his activity level has diminished significantly  At this time he present with decreased  ROM, strength, static balance, functional reach, endurance, and gait deviations including those listed in observation section  His functional outcome measures but him at risk of falls   These impairments are limiting the patient's ability to participate in household roles such as taking out the trash, rimming the weeds, moving the lawn due to difficulty getting off the tractor  Patient will benefit from skilled outpatient physical therapy to address the above impairments in order to improve safety during ambulation and increase participation community activities        STG's: 4 weeks    - Patient improves EC on foam task by 5 points on MCSTIB for improved static balance within 4 weeks    - Patient is independent with initial home exercise program for improvements at home within 2 weeks    - Patient ambulates for 6 consecutive min with appropriate vital sign response for improved endurance within 4 weeks    - Patient will demonstrate improve muscle strength by improving 5 x sit to stand by 15 seconds    - Patient will complete FGA in 2 weeks    - Patient will improve in functional reach by 4 inches for improved balance  LTG's: 12 weeks   - Patient improves distance ambulated on 6MWT by 30% within 8 weeks for improved endurance   - Patient decreased time to complete TUG by 4 sec for decreased risk of falls   - Patient improves gait speed to within 30% of age matched norms within 8 weeks   - Patient improves score on FGA by 5 points for improved dynamic balance   - Patient will  Improve PSFS by 15 points for improved quality of life in 12 weeks    Plan:  Patient will benefit from skilled outpatient physical therapy 2 x/wk for 12 wk   Therapeutic exercises, Neuromuscular re-education, therapeutic activities and manual therapy intervention        Precautions: chronic back pain, right eye prosthesis, chronic arterial ischemic stroke       7/28            Endurance 6 MWT                                                   Neuro Re-Ed             Balance mctsib  TUG             Functional Reach                                                                             Ther Ex 5x sit to stand

## 2021-07-29 ENCOUNTER — OFFICE VISIT (OUTPATIENT)
Dept: PHYSICAL THERAPY | Facility: REHABILITATION | Age: 86
End: 2021-07-29
Payer: MEDICARE

## 2021-07-29 DIAGNOSIS — R26.9 GAIT DISTURBANCE: ICD-10-CM

## 2021-07-29 DIAGNOSIS — R26.89 BALANCE DISORDER: ICD-10-CM

## 2021-07-29 DIAGNOSIS — R53.81 PHYSICAL DECONDITIONING: Primary | ICD-10-CM

## 2021-07-29 PROCEDURE — 97112 NEUROMUSCULAR REEDUCATION: CPT | Performed by: PHYSICAL THERAPIST

## 2021-07-29 PROCEDURE — 97116 GAIT TRAINING THERAPY: CPT | Performed by: PHYSICAL THERAPIST

## 2021-07-29 PROCEDURE — 97110 THERAPEUTIC EXERCISES: CPT | Performed by: PHYSICAL THERAPIST

## 2021-07-29 NOTE — PROGRESS NOTES
Daily Note     Today's date: 2021  Patient name: Te Perdomo  : 1935  MRN: [id number]  Referring provider: Isrrael Peter MD  Dx:   Encounter Diagnosis     ICD-10-CM    1  Physical deconditioning  R53 81    2  Gait disturbance  R26 9    3  Balance disorder  R26 89                   Subjective: Reports no changes since last visit  Would like to work on his balance  When not using walker feeling like he is going to fall forward  Doesn't  his feet high enough  Reports his low back is "achy and tight" with standing for extended periods  Objective: See treatment diary below      Assessment: Tolerated treatment well  Pt was somewhat limited through out visit by low back pain requesting rest breaks  Demonstrates inadequate foot clearance particularly on LLE needing verbal and visual cues to improve  Also tendency to lean forward with ambulating w/o AD  Demonstrated instability at times need occasional UE support with balance training  Patient would benefit from continued PT      Plan: Continue per plan of care  Precautions: chronic back pain, right eye prosthesis, chronic arterial ischemic stroke    HEP: STS, standing march, gastroc stretch                Endurance 6 MWT                                                   Neuro Re-Ed             Balance mctsib  TUG FT foam EO 30"x3            Functional Reach            Step ups  6in 1' ea fowrd           Hurdles   Low fowrd 5 laps in//bars   Min UE use           Walking march  6 laps in //bars 1 hand on rail                                     Ther Ex 5x sit to stand            Scifit  L1 10 min    HR-86  O2-96%           STS  2x10 no UEs           Gastroc stretch  30"x2 ea standing                                     Gait Training             Overground   200ft x1  100ft x1 no AD cues to for step height, and posture               [employer] TM

## 2021-08-02 ENCOUNTER — OFFICE VISIT (OUTPATIENT)
Dept: PHYSICAL THERAPY | Facility: REHABILITATION | Age: 86
End: 2021-08-02
Payer: MEDICARE

## 2021-08-02 DIAGNOSIS — R26.89 BALANCE DISORDER: ICD-10-CM

## 2021-08-02 DIAGNOSIS — R26.9 GAIT DISTURBANCE: ICD-10-CM

## 2021-08-02 DIAGNOSIS — R53.81 PHYSICAL DECONDITIONING: Primary | ICD-10-CM

## 2021-08-02 PROCEDURE — 97112 NEUROMUSCULAR REEDUCATION: CPT | Performed by: PHYSICAL THERAPIST

## 2021-08-02 PROCEDURE — 97110 THERAPEUTIC EXERCISES: CPT | Performed by: PHYSICAL THERAPIST

## 2021-08-02 PROCEDURE — 97116 GAIT TRAINING THERAPY: CPT | Performed by: PHYSICAL THERAPIST

## 2021-08-02 NOTE — PROGRESS NOTES
Daily Note     Today's date: 2021  Patient name: Daniel Smith  : 1935  MRN: [id number]  Referring provider: Dawna Savage MD  Dx:   Encounter Diagnosis     ICD-10-CM    1  Physical deconditioning  R53 81    2  Gait disturbance  R26 9    3  Balance disorder  R26 89                   Subjective: Reports feeling well  Is having less low back pain  States he threw his back brace away  Objective: See treatment diary below      Assessment: Tolerated treatment well  Pt did not complain of low back pain today, but is limited by fatigue  Considerable dragging of LLE, which he is able to improve with cues  Tends to perform exercises slowly  Patient would benefit from continued PT      Plan: Continue per plan of care  Precautions: chronic back pain, right eye prosthesis, chronic arterial ischemic stroke    HEP: STS, standing march, gastroc stretch               Endurance 6 MWT                                                   Neuro Re-Ed             Balance mctsib  TUG FT foam EO 30"x3 FT foam EO 30"x3           Functional Reach            Step ups  6in 1' ea fowrd 6in 1' ea fowrd, no UE    Lateral, 1 hand 1'          Hurdles   Low fowrd 5 laps in//bars  Min UE use           Walking march  6 laps in //bars 1 hand on rail                                     Ther Ex 5x sit to stand            Scifit  L1 10 min    HR-86  O2-96% L3 10 min    HR-88  O2-96%          STS  2x10 no UEs 2x10 no UEs          Gastroc stretch  30"x2 ea standing 30"x2 ea standing                                    Gait Training             Overground   200ft x1  100ft x1 no AD cues to for step height, and posture     100ft x3 no AD in SOLO  Cues for step height   Stepping over cones          [employer]Data

## 2021-08-03 ENCOUNTER — OFFICE VISIT (OUTPATIENT)
Dept: PHYSICAL THERAPY | Facility: REHABILITATION | Age: 86
End: 2021-08-03
Payer: MEDICARE

## 2021-08-03 DIAGNOSIS — R26.89 BALANCE DISORDER: Primary | ICD-10-CM

## 2021-08-03 DIAGNOSIS — R53.81 PHYSICAL DECONDITIONING: ICD-10-CM

## 2021-08-03 PROCEDURE — 97116 GAIT TRAINING THERAPY: CPT

## 2021-08-03 PROCEDURE — 97112 NEUROMUSCULAR REEDUCATION: CPT

## 2021-08-03 PROCEDURE — 97110 THERAPEUTIC EXERCISES: CPT

## 2021-08-03 NOTE — PROGRESS NOTES
Daily Note     Today's date: 8/3/2021  Patient name: Chivo Callahan  : 1935  MRN: [id number]  Referring provider: Dickie Jeans, MD  Dx:   Encounter Diagnosis     ICD-10-CM    1  Balance disorder  R26 89    2  Physical deconditioning  R53 81                   Subjective: Patient reports he feels sore  He states he was out in the yard pulling weeds before coming to therapy  He states that he has his truck parked behind him so that he doesn't fall backwards when he stands back up    Objective: See treatment diary below      Assessment: Patient tolerated treatment session well, requires rest breaks due to fatigue levels  Patient with improved foot clearance with cuing, however does not have significant carryover  Patient would benefit from continued physical therapy  Plan: Continue per plan of care  Precautions: chronic back pain, right eye prosthesis, chronic arterial ischemic stroke    HEP: STS, standing march, gastroc stretch     7/28 7/29 8/2 8/3         Endurance 6 MWT                                                   Neuro Re-Ed             Balance mctsib  TUG FT foam EO 30"x3 FT foam EO 30"x3 NBOS foam 30" x 3  Foam with EC 30" x 3          Functional Reach            Step ups  6in 1' ea fowrd 6in 1' ea fowrd, no UE    Lateral, 1 hand 1'          Hurdles   Low fowrd 5 laps in//bars   Min UE use  Low hurdles, with minimal UE support in // bars, x 3 laps          Walking march  6 laps in //bars 1 hand on rail                                     Ther Ex 5x sit to stand            Scifit  L1 10 min    HR-86  O2-96% L3 10 min    HR-88  O2-96% L3 7 minutes, patient needed to stop due to fatigue    HR 91-95  O2 95-97%         STS  2x10 no UEs 2x10 no UEs 2 x 10 no UE's         Gastroc stretch  30"x2 ea standing 30"x2 ea standing 30" x 2 each sitting due to fatigue levels, with SOS                                   Gait Training             Overground   200ft x1  100ft x1 no AD cues to for step height, and posture     100ft x3 no AD in SOLO  Cues for step height  Stepping over cones 200 feet x 1 without AD, cues for increased step length and heel strike on L, and posture           [employer] TM Alar Island Pedicle Flap Text: The defect edges were debeveled with a #15 scalpel blade.  Given the location of the defect, shape of the defect and the proximity to the alar rim an island pedicle advancement flap was deemed most appropriate.  Using a sterile surgical marker, an appropriate advancement flap was drawn incorporating the defect, outlining the appropriate donor tissue and placing the expected incisions within the nasal ala running parallel to the alar rim. The area thus outlined was incised with a #15 scalpel blade.  The skin margins were undermined minimally to an appropriate distance in all directions around the primary defect and laterally outward around the island pedicle utilizing iris scissors.  There was minimal undermining beneath the pedicle flap.

## 2021-08-06 DIAGNOSIS — I10 BENIGN ESSENTIAL HYPERTENSION: ICD-10-CM

## 2021-08-06 RX ORDER — METOPROLOL SUCCINATE 100 MG/1
TABLET, EXTENDED RELEASE ORAL
Qty: 90 TABLET | Refills: 3 | Status: SHIPPED | OUTPATIENT
Start: 2021-08-06 | End: 2022-04-27 | Stop reason: SDUPTHER

## 2021-08-09 ENCOUNTER — OFFICE VISIT (OUTPATIENT)
Dept: PHYSICAL THERAPY | Facility: REHABILITATION | Age: 86
End: 2021-08-09
Payer: MEDICARE

## 2021-08-09 DIAGNOSIS — R53.81 PHYSICAL DECONDITIONING: Primary | ICD-10-CM

## 2021-08-09 DIAGNOSIS — R26.89 BALANCE DISORDER: ICD-10-CM

## 2021-08-09 PROCEDURE — 97112 NEUROMUSCULAR REEDUCATION: CPT

## 2021-08-09 PROCEDURE — 97116 GAIT TRAINING THERAPY: CPT

## 2021-08-09 PROCEDURE — 97110 THERAPEUTIC EXERCISES: CPT

## 2021-08-09 NOTE — PROGRESS NOTES
Daily Note     Today's date: 2021  Patient name: Emily Garcia  : 1935  MRN: [id number]  Referring provider: Jed Patino MD  Dx:   Encounter Diagnosis     ICD-10-CM    1  Physical deconditioning  R53 81    2  Balance disorder  R26 89                   Subjective: Patient reports his shoulder feels sore, and he feels tired and weak  Objective: See treatment diary below      Assessment: Patient tolerated treatment session well, continues to require rest breaks due to fatigue  Continues to require cues for foot clearance, especially with hurdles  Continues to require vc's for increased foot clearance with ambulation  Patient would benefit from continued physical therapy  Plan: Continue per plan of care  Precautions: chronic back pain, right eye prosthesis, chronic arterial ischemic stroke    HEP: STS, standing march, gastroc stretch     7/28 7/29 8/2 8/3 8/9        Endurance 6 MWT                                                   Neuro Re-Ed             Balance mctsib  TUG FT foam EO 30"x3 FT foam EO 30"x3 NBOS foam 30" x 3  Foam with EC 30" x 3 NBOS foam 30sec x 3  Foam NBOS EC 30sec x 2         Functional Reach            Step ups  6in 1' ea fowrd 6in 1' ea fowrd, no UE    Lateral, 1 hand 1'          Hurdles   Low fowrd 5 laps in//bars  Min UE use  Low hurdles, with minimal UE support in // bars, x 3 laps  Low hurdles forward with minimal UE support in // bars x 4 laps, cues for increased clearance on R LE   Sideways with UE support on // bars x 2 laps        Walking march  6 laps in //bars 1 hand on rail                                     Ther Ex 5x sit to stand            Scifit  L1 10 min    HR-86  O2-96% L3 10 min    HR-88  O2-96% L3 7 minutes, patient needed to stop due to fatigue    HR 91-95  O2 95-97% L3 for 10 minutes    SpO2 94-96%,  HR 75-80        STS  2x10 no UEs 2x10 no UEs 2 x 10 no UE's 2 x 10 without UE's, patient with decreased pushing back on chair for 2nd set Gastroc stretch  30"x2 ea standing 30"x2 ea standing 30" x 2 each sitting due to fatigue levels, with SOS 1 min x 2 each in standing                                  Gait Training             Overground   200ft x1  100ft x1 no AD cues to for step height, and posture     100ft x3 no AD in SOLO  Cues for step height  Stepping over cones 200 feet x 1 without AD, cues for increased step length and heel strike on L, and posture   200 feet x 1 without AD, cues for increased step length and heel strike        [employer] TM

## 2021-08-12 ENCOUNTER — OFFICE VISIT (OUTPATIENT)
Dept: PHYSICAL THERAPY | Facility: REHABILITATION | Age: 86
End: 2021-08-12
Payer: MEDICARE

## 2021-08-12 DIAGNOSIS — R26.9 GAIT DISTURBANCE: ICD-10-CM

## 2021-08-12 DIAGNOSIS — R26.89 BALANCE DISORDER: ICD-10-CM

## 2021-08-12 DIAGNOSIS — R53.81 PHYSICAL DECONDITIONING: Primary | ICD-10-CM

## 2021-08-12 PROCEDURE — 97112 NEUROMUSCULAR REEDUCATION: CPT

## 2021-08-12 PROCEDURE — 97110 THERAPEUTIC EXERCISES: CPT

## 2021-08-12 NOTE — PROGRESS NOTES
Daily Note     Today's date: 2021  Patient name: Cesar Rolon  : 1935  MRN: [id number]  Referring provider: Ye Fitzgerald MD  Dx:   Encounter Diagnosis     ICD-10-CM    1  Physical deconditioning  R53 81    2  Balance disorder  R26 89    3  Gait disturbance  R26 9        Start Time: 1015  Stop Time: 1100  Total time in clinic (min): 45 minutes    Subjective: I haven't been doing my exercises because I am lazy  ( encouraged patient to do just a little more every day)  Objective: See treatment diary below      Assessment: Patient requires cuing and encouragement to participate in session  Encouraged HEP compliance for improved advancement in functional mobility  Initiated treadmill ambulation 2 5 minutes x 2 with resting periods in back supported chair  Increased participation as treatment progressed  Patient would benefit from continued physical therapy  Plan: Continue per plan of care  Precautions: chronic back pain, right eye prosthesis, chronic arterial ischemic stroke    HEP: STS, standing march, gastroc stretch     7/28 7/29 8/2 8/3 8/9 8/12   Endurance 6 MWT        Neuro Re-Ed         Balance mctsib  TUG FT foam EO 30"x3 FT foam EO 30"x3 NBOS foam 30" x 3  Foam with EC 30" x 3 NBOS foam 30sec x 3  Foam NBOS EC 30sec x 2     Functional Reach        Step ups  6in 1' ea fowrd 6in 1' ea fowrd, no UE    Lateral, 1 hand 1'    Stepping up and down on the treadmill, requires verbal cues as for proper body mechanisc   Hurdles   Low fowrd 5 laps in//bars  Min UE use  Low hurdles, with minimal UE support in // bars, x 3 laps  Low hurdles forward with minimal UE support in // bars x 4 laps, cues for increased clearance on R LE   Sideways with UE support on // bars x 2 laps -High step with minimal U support  - sidestepping 16 x 4 (simulating kitchen counter)    - Backward stepping 16 x 3       Walking march  6 laps in //bars 1 hand on rail                         Ther Ex 5x sit to stand Scifit  L1 10 min    HR-86  O2-96% L3 10 min    HR-88  O2-96% L3 7 minutes, patient needed to stop due to fatigue    HR 91-95  O2 95-97% L3 for 10 minutes    SpO2 94-96%,  HR 75-80 L3 for 10 minutes    SpO2 94-96%,  HR 75-80   STS  2x10 no UEs 2x10 no UEs 2 x 10 no UE's 2 x 10 without UE's, patient with decreased pushing back on chair for 2nd set    Gastroc stretch  30"x2 ea standing 30"x2 ea standing 30" x 2 each sitting due to fatigue levels, with SOS 1 min x 2 each in standing                      Gait Training         Overground   200ft x1  100ft x1 no AD cues to for step height, and posture     100ft x3 no AD in SOLO  Cues for step height  Stepping over cones 200 feet x 1 without AD, cues for increased step length and heel strike on L, and posture  200 feet x 1 without AD, cues for increased step length and heel strike Biodex ambulation 2 5 minutes   6mph   Rest x 3 minutes   2 5 min    9mph      [employer] TM

## 2021-08-18 ENCOUNTER — OFFICE VISIT (OUTPATIENT)
Dept: PHYSICAL THERAPY | Facility: REHABILITATION | Age: 86
End: 2021-08-18
Payer: MEDICARE

## 2021-08-18 DIAGNOSIS — R53.81 PHYSICAL DECONDITIONING: Primary | ICD-10-CM

## 2021-08-18 DIAGNOSIS — R26.9 GAIT DISTURBANCE: ICD-10-CM

## 2021-08-18 DIAGNOSIS — R26.89 BALANCE DISORDER: ICD-10-CM

## 2021-08-18 PROCEDURE — 97110 THERAPEUTIC EXERCISES: CPT

## 2021-08-18 PROCEDURE — 97112 NEUROMUSCULAR REEDUCATION: CPT

## 2021-08-18 NOTE — PROGRESS NOTES
Daily Note     Today's date: 2021  Patient name: Te Perdomo  : 1935  MRN: [id number]  Referring provider: Isrrael Peter MD  Dx:   Encounter Diagnosis     ICD-10-CM    1  Physical deconditioning  R53 81    2  Balance disorder  R26 89    3  Gait disturbance  R26 9        Start Time: 1005   Stop Time: 1100  Total time in clinic (min): 55 minutes    Subjective: I don't think I need to do my exercises at home  Reports did the weed wacking around the front of the house  Reports has been walking about 1 5 blocks before stopping  The new goal is to walk outdoor every day  Objective: See treatment diary below 163/89       Assessment: Patient tolerated treatment well  Decreased balance recovery assistance during higher level balance activities today  His HEP is limited, but he is participating in house shores more  Discussed importance of walking as part of his HEP  Patient would benefit from continued physical therapy  Plan: Continue per plan of care  Precautions: chronic back pain, right eye prosthesis, chronic arterial ischemic stroke    HEP: STS, standing march, gastroc stretch        Endurance      Neuro Re-Ed      Balance NBOS foam 30sec x 3  Foam NBOS EC 30sec x 2  Solostep  Backwards 50' x 2  High Step 60' x 4  Sidestep  50' x 2 with large amplitude arms         Step ups   Stepping up and down on the treadmill, requires verbal cues as for proper body mechanisc    Hurdles  Low hurdles forward with minimal UE support in // bars x 4 laps, cues for increased clearance on R LE   Sideways with UE support on // bars x 2 laps -High step with minimal U support  - sidestepping 16 x 4 (simulating kitchen counter)    - Backward stepping 16 x 3     Solostep    Forwards step 25' x 4       Walking march                  Ther Ex      Scifit L3 for 10 minutes    SpO2 94-96%,  HR 75-80 L3 for 10 minutes    SpO2 94-96%,  HR 75-80 L3 for 10 minutes    SpO2 94-96%,  HR 75-80   STS 2 x 10 without UE's, patient with decreased pushing back on chair for 2nd set     Gastroc stretch 1 min x 2 each in standing                 Gait Training      Overground  200 feet x 1 without AD, cues for increased step length and heel strike Biodex ambulation 2 5 minutes   6mph   Rest x 3 minutes   2 5 min    9mph    Solostep     Large amplitude ambulation with 100% verbal cues  96 HR  Spo2 96%

## 2021-08-20 ENCOUNTER — OFFICE VISIT (OUTPATIENT)
Dept: PHYSICAL THERAPY | Facility: REHABILITATION | Age: 86
End: 2021-08-20
Payer: MEDICARE

## 2021-08-20 DIAGNOSIS — R26.89 BALANCE DISORDER: ICD-10-CM

## 2021-08-20 DIAGNOSIS — R26.9 GAIT DISTURBANCE: ICD-10-CM

## 2021-08-20 DIAGNOSIS — R53.81 PHYSICAL DECONDITIONING: Primary | ICD-10-CM

## 2021-08-20 PROCEDURE — 97110 THERAPEUTIC EXERCISES: CPT

## 2021-08-20 PROCEDURE — 97112 NEUROMUSCULAR REEDUCATION: CPT

## 2021-08-20 NOTE — PROGRESS NOTES
Daily Note     Today's date: 2021  Patient name: Adan Aldana  : 1935  MRN: [id number]  Referring provider: Courtney Tobias MD  Dx:   No diagnosis found  Start Time:    Stop Time: 1335  Total time in clinic (min): 60 minutes    Subjective: I did not walk too far yesterday  Objective: See treatment diary below 155/76    Assessment: Patient tolerated treatment well  Required resting periods during activity  Patient would benefit from continued physical therapy  Plan: Continue per plan of care  Precautions: chronic back pain, right eye prosthesis, chronic arterial ischemic stroke    HEP: STS, standing march, gastroc stretch        Endurance       Neuro Re-Ed       Balance NBOS foam 30sec x 3  Foam NBOS EC 30sec x 2  Solostep  Backwards 50' x 2  High Step 60' x 4  Sidestep  50' x 2 with large amplitude arms Solostep    High marching for weight shifting 40' x 2     Side stepping 40' x 2  Patient requires resting periods          Step ups   Stepping up and down on the treadmill, requires verbal cues as for proper body mechanisc     Hurdles  Low hurdles forward with minimal UE support in // bars x 4 laps, cues for increased clearance on R LE  Sideways with UE support on // bars x 2 laps -High step with minimal U support  - sidestepping 16 x 4 (simulating kitchen counter)    - Backward stepping 16 x 3     Solostep    Forwards step 25' x 4        Ther Ex       Scifit L3 for 10 minutes    SpO2 94-96%,  HR 75-80 L3 for 10 minutes    SpO2 94-96%,  HR 75-80 L3 for 10 minutes  SpO2 94-96%,  HR 75-80 L3 for 10 minutes SPo2 94% HR 78   STS 2 x 10 without UE's, patient with decreased pushing back on chair for 2nd set      Gastroc stretch 1 min x 2 each in standing                    Gait Training       Overground  200 feet x 1 without AD, cues for increased step length and heel strike Biodex ambulation 2 5 minutes   6mph   Rest x 3 minutes   2 5 min    9mph    Solostep     Large amplitude ambulation with 100% verbal cues  96 HR  Spo2 96%    SOLOSTEP  Biodex Ambulation   9mph hr 79   5 minutes  SPO2 95%  Rest   3 minutes SPO2 94%

## 2021-08-23 ENCOUNTER — OFFICE VISIT (OUTPATIENT)
Dept: PHYSICAL THERAPY | Facility: REHABILITATION | Age: 86
End: 2021-08-23
Payer: MEDICARE

## 2021-08-23 DIAGNOSIS — R26.9 GAIT DISTURBANCE: ICD-10-CM

## 2021-08-23 DIAGNOSIS — R26.89 BALANCE DISORDER: ICD-10-CM

## 2021-08-23 DIAGNOSIS — R53.81 PHYSICAL DECONDITIONING: Primary | ICD-10-CM

## 2021-08-23 PROCEDURE — 97110 THERAPEUTIC EXERCISES: CPT

## 2021-08-23 PROCEDURE — 97112 NEUROMUSCULAR REEDUCATION: CPT

## 2021-08-26 ENCOUNTER — APPOINTMENT (OUTPATIENT)
Dept: PHYSICAL THERAPY | Facility: REHABILITATION | Age: 86
End: 2021-08-26
Payer: MEDICARE

## 2021-08-30 ENCOUNTER — APPOINTMENT (OUTPATIENT)
Dept: PHYSICAL THERAPY | Facility: REHABILITATION | Age: 86
End: 2021-08-30
Payer: MEDICARE

## 2021-09-01 ENCOUNTER — APPOINTMENT (OUTPATIENT)
Dept: PHYSICAL THERAPY | Facility: REHABILITATION | Age: 86
End: 2021-09-01
Payer: MEDICARE

## 2021-09-02 ENCOUNTER — OFFICE VISIT (OUTPATIENT)
Dept: PHYSICAL THERAPY | Facility: REHABILITATION | Age: 86
End: 2021-09-02
Payer: MEDICARE

## 2021-09-02 DIAGNOSIS — R26.89 BALANCE DISORDER: ICD-10-CM

## 2021-09-02 DIAGNOSIS — R53.81 PHYSICAL DECONDITIONING: Primary | ICD-10-CM

## 2021-09-02 DIAGNOSIS — R26.9 GAIT DISTURBANCE: ICD-10-CM

## 2021-09-02 PROCEDURE — 97116 GAIT TRAINING THERAPY: CPT | Performed by: PHYSICAL THERAPIST

## 2021-09-02 PROCEDURE — 97112 NEUROMUSCULAR REEDUCATION: CPT | Performed by: PHYSICAL THERAPIST

## 2021-09-02 NOTE — PROGRESS NOTES
Daily Note    Today's date: 2021  Patient name: Agueda Hayden  : 1935  MRN: [id number]  Referring provider: Shawn Nascimento MD  Dx:   Encounter Diagnosis     ICD-10-CM    1  Physical deconditioning  R53 81    2  Balance disorder  R26 89    3  Gait disturbance  R26 9                    Subjective: Reports vertigo has been the same since last visit  Only bothering him though when he lies down into bed at night  States he tries to go without his walker but feels unsteady  States he has been feeling very "stiff" with his movement lately which he associates two days following therapy visits  Objective: See treatment diary below           Assessment: Tolerated treatment fair  Demonstrated positive Itzel Spillers on L side today which responded well to Epley maneuver  Observed pill rolling tremor on R hand during entire visit today  Was challenged with increasing step amplitude reporting increased feeling of instability  Rest breaks requested through out  Pt declined to perform backwards walking or step ups today  Expressed concerns of being too fatigued by end of visit  Discussed POC and interventions with pt, who confirmed that he is satified with his treatments and POC, but is concerned about becoming too fatigued from visit, which he associates with his feeling stiffness and movement rigidity   Would benefit from continued PT        STG's: 4 weeks    - Patient improves EC on foam task by 5 points on MCSTIB for improved static balance within 4 weeks ongoing    - Patient is independent with initial home exercise program for improvements at home within 2 weeks ongoing    - Patient ambulates for 6 consecutive min with appropriate vital sign response for improved endurance within 4 weeks  ongoing    - Patient will demonstrate improve muscle strength by improving 5 x sit to stand by 15 seconds ongoing    - Patient will complete FGA in 2 weeks ongoing    - Patient will improve in functional reach by 4 inches for improved balance  ongoing  LTG's: 12 weeks   - Patient improves distance ambulated on 6MWT by 30% within 8 weeks for improved endurance  Ongoing   - Patient decreased time to complete TUG by 4 sec for decreased risk of falls ongoing   - Patient improves gait speed to within 30% of age matched norms within 8 weeks  ongoing    - Patient improves score on FGA by 5 points for improved dynamic balance ongoing   - Patient will  Improve PSFS by 15 points for improved quality of life in 12 weeks progressing    Plan:  Patient will benefit from continued skilled outpatient physical therapy 2 x/wk for 8 wk  Therapeutic exercises, Neuromuscular re-education, vestibular intervention, therapeutic activities and manual therapy intervention       Precautions: chronic back pain, right eye prosthesis, chronic arterial ischemic stroke    HEP: STS, standing march, gastroc stretch     8/12 8/18 8/20 8/23 9/2   Endurance        Neuro Re-Ed        [employer]  Backwards 50' x 2  High Step 60' x 4  Sidestep  50' x 2 with large amplitude arms Solostep    High marching for weight shifting 40' x 2     Side stepping 40' x 2  Patient requires resting periods TUG Stepping over 2in cones in SOLO, 4 laps    Pt declines backwards walking               Step ups  Stepping up and down on the treadmill, requires verbal cues as for proper body mechanisc   5 x sit to stand Pt declines    Hurdles  -High step with minimal U support  - sidestepping 16 x 4 (simulating kitchen counter)    - Backward stepping 16 x 3     Solostep    Forwards step 25' x 4          Ther Ex        Scifit L3 for 10 minutes    SpO2 94-96%,  HR 75-80 L3 for 10 minutes  SpO2 94-96%,  HR 75-80 L3 for 10 minutes SPo2 94% HR 78  Pt declines   STS        Gastroc stretch                        Gait Training        Overground  Biodex ambulation 2 5 minutes   6mph   Rest x 3 minutes   2 5 min    9mph    Solostep     Large amplitude ambulation with 100% verbal cues  96 HR  Spo2 96%    SOLOSTEP  Biodex Ambulation   9mph hr 79   5 minutes  SPO2 95%  Rest   3 minutes SPO2 94% 6 MWT Overground cues to increase step amplitude 100ft x3    With manual facilitation for arm swing 100ft   Vestibular testing    Modified Noni Bramble Epley with good result reported symptom reduction 1/10 Pos L Logan Hallpike    Epley x1 for L side

## 2021-09-03 ENCOUNTER — APPOINTMENT (OUTPATIENT)
Dept: PHYSICAL THERAPY | Facility: REHABILITATION | Age: 86
End: 2021-09-03
Payer: MEDICARE

## 2021-09-10 ENCOUNTER — OFFICE VISIT (OUTPATIENT)
Dept: PHYSICAL THERAPY | Facility: REHABILITATION | Age: 86
End: 2021-09-10
Payer: MEDICARE

## 2021-09-10 DIAGNOSIS — R26.9 GAIT DISTURBANCE: ICD-10-CM

## 2021-09-10 DIAGNOSIS — R53.81 PHYSICAL DECONDITIONING: Primary | ICD-10-CM

## 2021-09-10 DIAGNOSIS — R26.89 BALANCE DISORDER: ICD-10-CM

## 2021-09-10 PROCEDURE — 97140 MANUAL THERAPY 1/> REGIONS: CPT

## 2021-09-10 PROCEDURE — 97112 NEUROMUSCULAR REEDUCATION: CPT

## 2021-09-10 PROCEDURE — 97110 THERAPEUTIC EXERCISES: CPT
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
## 2021-09-13 ENCOUNTER — APPOINTMENT (OUTPATIENT)
Dept: PHYSICAL THERAPY | Facility: REHABILITATION | Age: 86
End: 2021-09-13
Payer: MEDICARE

## 2021-09-16 ENCOUNTER — OFFICE VISIT (OUTPATIENT)
Dept: URGENT CARE | Facility: CLINIC | Age: 86
End: 2021-09-16
Payer: MEDICARE

## 2021-09-16 ENCOUNTER — APPOINTMENT (OUTPATIENT)
Dept: PHYSICAL THERAPY | Facility: REHABILITATION | Age: 86
End: 2021-09-16
Payer: MEDICARE

## 2021-09-16 VITALS
HEIGHT: 68 IN | HEART RATE: 65 BPM | SYSTOLIC BLOOD PRESSURE: 150 MMHG | TEMPERATURE: 96.7 F | WEIGHT: 170 LBS | BODY MASS INDEX: 25.76 KG/M2 | OXYGEN SATURATION: 96 % | RESPIRATION RATE: 16 BRPM | DIASTOLIC BLOOD PRESSURE: 74 MMHG

## 2021-09-16 DIAGNOSIS — S61.411A SKIN TEAR OF RIGHT HAND WITHOUT COMPLICATION, INITIAL ENCOUNTER: Primary | ICD-10-CM

## 2021-09-16 PROCEDURE — 99213 OFFICE O/P EST LOW 20 MIN: CPT | Performed by: NURSE PRACTITIONER

## 2021-09-16 PROCEDURE — G0463 HOSPITAL OUTPT CLINIC VISIT: HCPCS | Performed by: NURSE PRACTITIONER

## 2021-09-16 NOTE — PROGRESS NOTES
[facility]        NAME: Joan Stafford is a 81 y o  male  : 1935    MRN: [id number]  DATE: 2021  TIME: 1:48 PM    Assessment and Plan   Skin tear of right hand without complication, initial encounter [S61 411A]  1  Skin tear of right hand without complication, initial encounter       Hand soaked in sterile water to remove stuck gauze  Wound cleansed with wound cleanser   Steri strip applied to corner to adhere remaining skin  Area without skin covered with vaseline gauze, nonadherent bandage, and coband applied  Patient Instructions     Follow up with PCP in 3-5 days  Proceed to  ER if symptoms worsen  Chief Complaint     Chief Complaint   Patient presents with    Wound Check     Pt states he fell off of his lawn tractor while mowing the grass yesterday  Pt states the tractor's front end went up causing him to fall off  Pt was seen at [facility] ED yesterday, treated and discharged  Pt has right hand skin tear (base of thumb) where he c/o pain  Gauze was inbedded in wound  History of Present Illness   Joan Stafford presents to the clinic c/o    Wound Check (Pt states he fell off of his lawn tractor while mowing the grass yesterday  Pt states the tractor's front end went up causing him to fall off  Pt was seen at [facility] ED yesterday, treated and discharged  Pt has right hand skin tear (base of thumb) where he c/o pain  Gauze was inbedded in wound )      Review of Systems   Review of Systems   All other systems reviewed and are negative  Current Medications     Long-Term Medications   Medication Sig Dispense Refill    atorvastatin (LIPITOR) 40 mg tablet Take 1 tablet (40 mg total) by mouth daily at bedtime 30 tablet 3    fluticasone (FLONASE) 50 mcg/act nasal spray 2 sprays into each nostril daily      Lancets MISC To test BS once daily   100 each 3    meclizine (ANTIVERT) 25 mg tablet Take 1 tablet (25 mg total) by mouth every 8 (eight) hours as needed for dizziness 30 tablet 0    metFORMIN (GLUCOPHAGE) 500 mg tablet Take 1 tablet (500 mg total) by mouth every 12 (twelve) hours 180 tablet 1    metoprolol succinate (TOPROL-XL) 100 mg 24 hr tablet TAKE 1 TABLET BY MOUTH  DAILY 90 tablet 3    mirtazapine (REMERON) 15 mg tablet Take 7 5 mg by mouth daily      nitroglycerin (NITROSTAT) 0 4 mg SL tablet Place 0 4 mg under the tongue as needed      TOBRADEX ophthalmic ointment 3 (three) times a day         Current Allergies     Allergies as of 09/16/2021    (No Known Allergies)            The following portions of the patient's history were reviewed and updated as appropriate: allergies, current medications, past family history, past medical history, past social history, past surgical history and problem list     Objective   /74   Pulse 65   Temp (!) 96 7 °F (35 9 °C) (Tympanic)   Resp 16   Ht 5' 8" (1 727 m)   Wt 77 1 kg (170 lb)   SpO2 96%   BMI 25 85 kg/m²        Physical Exam     Physical Exam  Vitals and nursing note reviewed  Constitutional:       Appearance: Normal appearance  He is well-developed  HENT:      Head: Normocephalic and atraumatic  Eyes:      General: Lids are normal       Conjunctiva/sclera: Conjunctivae normal       Pupils: Pupils are equal, round, and reactive to light  Cardiovascular:      Rate and Rhythm: Normal rate and regular rhythm  Heart sounds: Normal heart sounds, S1 normal and S2 normal    Pulmonary:      Effort: Pulmonary effort is normal       Breath sounds: Normal breath sounds  Musculoskeletal:        Hands:       Comments: Skin tear noted in this location  Skin:     General: Skin is warm and dry  Neurological:      Mental Status: He is alert  Psychiatric:         Speech: Speech normal          Behavior: Behavior normal          Thought Content:  Thought content normal          Judgment: Judgment normal

## 2021-09-16 NOTE — PATIENT INSTRUCTIONS
Recommend over the counter Voltarin Gel   And Extra strength Tylenol for pain relief - take 2 tablets every 6 hours  Pain Management   WHAT YOU NEED TO KNOW:   Pain management includes medicines and therapies to treat pain from a surgery, injury, or illness  This can help increase your appetite, sleep, and energy  It can also improve your mood and your relationships  You and your family will receive information about how to manage your pain at home  The instructions will include what to do if you have side effects as your pain is managed  It will also include how to handle prescription pain medicine safely if it is prescribed  It is important to follow all instructions so your pain is managed effectively  This will help prevent a return to the hospital   DISCHARGE INSTRUCTIONS:   Call your doctor or pain specialist if:   · You continue to have breakthrough pain (pain between times of taking your medicine)  · The medicine you are taking makes you sleepier than usual or confused  · You have pain even after you take your pain medicine  · You have a new pain or the pain seems different than before  · You have constipation from prescription pain medicine that is not helped with treatment  · You have questions or concerns about your condition or care  Medicines:  Pain medicine may be given in any of the following ways, depending on the kind of pain you have:  · Over-the-counter:      ? NSAIDs , such as ibuprofen, help decrease swelling, pain, and fever  NSAIDs can cause stomach bleeding or kidney problems in certain people  If you take blood thinner medicine, always ask if NSAIDs are safe for you  Always read the medicine label and follow directions  Do not give these medicines to children under 10months of age without direction from your child's healthcare provider  ? Acetaminophen  decreases pain and fever  It is available without a doctor's order  Ask how much to take and how often to take it  Follow directions  Read the labels of all other medicines you are using to see if they also contain acetaminophen, or ask your doctor or pharmacist  Acetaminophen can cause liver damage if not taken correctly  Do not use more than 4 grams (4,000 milligrams) total of acetaminophen in one day  ? A pain cream, gel, or patch  may be applied to your skin on painful areas  · Prescription:      ? Several kinds of prescription pain medicines  are available  An example is opioid, or narcotic, pain medicine  Prescription pain medicines may be used for short-term (acute) pain  These medicines may not be given for long-term (chronic) pain control  Ask your healthcare provider how to take your specific prescription pain medicine safely  Also, some prescription pain medicines contain acetaminophen  Do not take other medicines that contain acetaminophen without talking to your healthcare provider  Too much acetaminophen may cause liver damage  ? Muscle relaxers  help decrease pain and muscle spasms  ? Steroids  decrease inflammation that causes pain  ? Anesthetic  medicines may be injected in or around a nerve to block pain signals from the nerves  ? Anxiety medicine  decreases anxiety  High levels of anxiety make pain harder to manage  ? Antidepressants  may be used to help decrease or prevent the symptoms of depression or anxiety  They are also used to treat nerve pain  ? Anticonvulsants  are usually used to control seizures  They may also be used to decrease chronic pain  · Take your medicine as directed  Contact your healthcare provider if you think your medicine is not helping or if you have side effects  Tell him or her if you are allergic to any medicine  Keep a list of the medicines, vitamins, and herbs you take  Include the amounts, and when and why you take them  Bring the list or the pill bottles to follow-up visits  Carry your medicine list with you in case of an emergency      Prescription pain medicine safety:   · Do not suddenly stop taking prescription pain medicine  If you have been taking prescription pain medicine for longer than 2 weeks, a sudden stop may cause dangerous side effects  Ask your healthcare provider for more information before you stop taking your medicine  · Take your medicine as directed  Take only the amount prescribed or recommended by your healthcare provider  Too much medicine may cause breathing problems or other health issues  If you use a pain patch, be sure to remove the old patch before you place a new one  · Do not drink alcohol while you use prescription medicines  Alcohol with prescription medicines can make you sleepy and slow your breathing rate  You may stop breathing completely  · Do not drive or operate heavy machinery after you take prescription pain medicine  Prescription pain medicine can make you drowsy and make it hard to concentrate  You may injure yourself or others if you drive or operate heavy machinery while taking your medicine  · Time your medicine correctly  Take your pain medicine 30 minutes before exercise or physical therapy  This helps decrease pain to help meet your treatment goals  You may need to take medicine before you go to bed  This may help you sleep and not be woken by pain  · Watch for side effects  Some foods, alcohol, and other medicines may cause side effects when you take pain medicine  Ask your healthcare provider how to prevent these problems  · Prevent constipation  This is a common side effect of prescription pain medicine  Eat foods high in fiber, such as raw fruit, vegetables, beans, and whole-grain bread and cereal  Ask your healthcare provider how much liquid to drink each day and which liquids are best for you  Exercise and activity may also help decrease the risk for constipation  · Follow instructions for what to do with medicine you do not use    Your healthcare provider will give you instructions for how to dispose of pain medicine safely  This helps make sure no one else takes the medicine  Ways pain may be managed without medicine:   · Massage therapy helps relieve tight muscles  This may help you relax and decrease pain  · Ultrasound can help decrease pain  Ultrasound is a procedure that uses sound waves to create heat applied to muscles  · Acupuncture helps reduce pain and other symptoms  Thin needles are used to balance energy channels in the body  · Biofeedback helps your body respond differently to pain  You will learn what your breathing and heart rate are when you are relaxed  That will help you get your breathing and heart rate to those levels when you are in pain  · Electrical stimulation may be used to control pain  Transcutaneous electrical stimulations (TENS) is a portable device that attaches to your skin  It uses mild, safe electrical signals to help control pain  Spinal cord stimulation (SCS) is a procedure that uses a metal wire placed near your spinal cord to help control pain  SCS also uses mild, safe electrical signals  The SCS is placed during surgery  · Surgery and other procedures may help relieve pain  Examples include radio waves, thermal (heat), or laser therapy  Surgery may also include cutting nerves or repairing joints that are the cause of your chronic pain  What you can do to manage pain:  The following may be helpful if you have mild pain or pain between medicine doses:  · Apply heat or ice as directed  Heat also relieves muscle spasms  Ice may help prevent tissue damage  Your healthcare provider may recommend only heat or ice, or you may be told to alternate  For heat, use a heat pack, heating pad, or a warm washcloth  The temperature should not be hot enough to burn your skin  Apply heat for 20 to 30 minutes every 2 hours for as many days as directed  For ice, use an ice pack, or put crushed ice in a plastic bag   Cover it with a towel before you place it on your skin  Apply ice for 15 to 20 minutes every hour or as directed  · Elevate the painful area above the level of your heart if possible  This will help decrease swelling and pain  Prop your painful area on pillows or blankets to keep it elevated comfortably  · Apply compression with an elastic bandage or abdominal binder as directed  An elastic bandage may be used after surgery on your joint, such as your knee  An abdominal binder may be used for surgeries in your abdomen  · Use devices to help you move and decrease pain  Devices can help remove pressure from the injury and provide extra support  Assistive devices include a splint, cane, crutches, or a walker  Knee sleeves and braces help decrease pain by giving your knees extra support  Arch supports and orthotics are devices that are put in your shoes to help you stand, walk, or run correctly  · Aromatherapy uses scents to relax, relieve stress, and decrease pain  Oils, extracts, or fragrances from flowers, herbs, and trees may be used  They may be inhaled or used during massages, facials, body wraps, and baths  · Meditation teaches you how to focus inside yourself  The goal of meditation is to help you feel more calm and peaceful  · Guided imagery teaches you to imagine a picture in your mind  You learn to focus on the picture instead of your pain  It may help you learn how to change the way your body senses and responds to pain  · Music may help increase energy levels and improve your mood  It may help reduce pain by triggering your body to release endorphins  These are natural body chemicals that decrease pain  Music may be used with any of the other techniques, such as relaxation and distraction  · Self-hypnosis is a way to direct your attention to something other than your pain  For example, you might repeat a positive statement about ignoring the pain or seeing the pain in a positive way      What else you can do to manage pain:   · Keep a pain diary  A pain diary may help track pain cycles so you know when and how your pain starts and ends  Include anything that makes your pain worse or better  Bring the pain diary to follow-up visits with your healthcare provider  · Talk to your healthcare provider about your daily activities  Some activities can cause pain to become worse or make pain management less effective  Your provider can help you find ways to reduce pain  For example, you may need to change when you take your pain medicine so it is more effective during activities  · Sleep in a comfortable position  Use pillows to support painful areas  · Go to rehabilitation as directed  Rehabilitation may include physical and occupational therapy  A physical therapist teaches you exercises to help improve movement and strength, and to decrease pain  An occupational therapist teaches you skills to help with your daily activities  · Exercise to help relieve pain and increase your energy  Talk to your healthcare provider about how much exercise to get each day and which exercises are best for you  Follow up with your doctor or pain specialist as directed:  Talk to your provider about your pain management at home  Tell him or her if you are able to do more of your daily activities or if any activity still causes pain  Your provider may want to make changes to your pain medicine or refer you to a specialist  For example, an occupational therapist can help you find new ways to do your daily activities so you have less pain  Write down your questions so you remember to ask them during your visits  © Copyright [employer] 2021 Information is for End User's use only and may not be sold, redistributed or otherwise used for commercial purposes   All illustrations and images included in CareNotes® are the copyrighted property of [employer]  or Michael Chatterjee  The above information is an  only  It is not intended as medical advice for individual conditions or treatments  Talk to your doctor, nurse or pharmacist before following any medical regimen to see if it is safe and effective for you

## 2021-09-20 ENCOUNTER — APPOINTMENT (OUTPATIENT)
Dept: PHYSICAL THERAPY | Facility: REHABILITATION | Age: 86
End: 2021-09-20
Payer: MEDICARE

## 2021-09-22 ENCOUNTER — APPOINTMENT (OUTPATIENT)
Dept: PHYSICAL THERAPY | Facility: REHABILITATION | Age: 86
End: 2021-09-22
Payer: MEDICARE

## 2021-09-27 ENCOUNTER — APPOINTMENT (OUTPATIENT)
Dept: PHYSICAL THERAPY | Facility: REHABILITATION | Age: 86
End: 2021-09-27
Payer: MEDICARE

## 2021-09-29 ENCOUNTER — APPOINTMENT (OUTPATIENT)
Dept: PHYSICAL THERAPY | Facility: REHABILITATION | Age: 86
End: 2021-09-29
Payer: MEDICARE

## 2021-10-09 DIAGNOSIS — E11.9 TYPE 2 DIABETES MELLITUS WITHOUT COMPLICATION, WITHOUT LONG-TERM CURRENT USE OF INSULIN (HCC): ICD-10-CM

## 2021-10-26 ENCOUNTER — OFFICE VISIT (OUTPATIENT)
Dept: FAMILY MEDICINE CLINIC | Facility: CLINIC | Age: 86
End: 2021-10-26
Payer: MEDICARE

## 2021-10-26 VITALS
HEART RATE: 57 BPM | DIASTOLIC BLOOD PRESSURE: 80 MMHG | WEIGHT: 167.6 LBS | TEMPERATURE: 96.7 F | BODY MASS INDEX: 25.4 KG/M2 | HEIGHT: 68 IN | OXYGEN SATURATION: 97 % | SYSTOLIC BLOOD PRESSURE: 144 MMHG

## 2021-10-26 DIAGNOSIS — E11.8 TYPE 2 DIABETES MELLITUS WITH COMPLICATION, WITHOUT LONG-TERM CURRENT USE OF INSULIN (HCC): ICD-10-CM

## 2021-10-26 DIAGNOSIS — I10 BENIGN ESSENTIAL HYPERTENSION: ICD-10-CM

## 2021-10-26 DIAGNOSIS — Z00.00 MEDICARE ANNUAL WELLNESS VISIT, SUBSEQUENT: Primary | ICD-10-CM

## 2021-10-26 DIAGNOSIS — I25.10 CORONARY ARTERY DISEASE INVOLVING NATIVE CORONARY ARTERY OF NATIVE HEART WITHOUT ANGINA PECTORIS: ICD-10-CM

## 2021-10-26 DIAGNOSIS — E78.1 HYPERTRIGLYCERIDEMIA: ICD-10-CM

## 2021-10-26 DIAGNOSIS — C18.6 CARCINOMA OF LEFT COLON (HCC): ICD-10-CM

## 2021-10-26 DIAGNOSIS — E78.2 MIXED HYPERLIPIDEMIA: ICD-10-CM

## 2021-10-26 DIAGNOSIS — I71.4 ABDOMINAL AORTIC ANEURYSM (AAA) WITHOUT RUPTURE (HCC): ICD-10-CM

## 2021-10-26 DIAGNOSIS — D69.6 PLATELETS DECREASED (HCC): ICD-10-CM

## 2021-10-26 DIAGNOSIS — I50.22 CHRONIC SYSTOLIC HEART FAILURE (HCC): ICD-10-CM

## 2021-10-26 DIAGNOSIS — I48.0 PAROXYSMAL ATRIAL FIBRILLATION (HCC): ICD-10-CM

## 2021-10-26 PROBLEM — I71.40 ABDOMINAL AORTIC ANEURYSM (AAA) WITHOUT RUPTURE: Status: ACTIVE | Noted: 2021-10-26

## 2021-10-26 PROCEDURE — G0439 PPPS, SUBSEQ VISIT: HCPCS | Performed by: FAMILY MEDICINE

## 2021-10-26 PROCEDURE — 1123F ACP DISCUSS/DSCN MKR DOCD: CPT | Performed by: FAMILY MEDICINE

## 2021-10-26 PROCEDURE — 99214 OFFICE O/P EST MOD 30 MIN: CPT | Performed by: FAMILY MEDICINE

## 2021-10-26 RX ORDER — ATORVASTATIN CALCIUM 40 MG/1
40 TABLET, FILM COATED ORAL
Qty: 90 TABLET | Refills: 3 | Status: SHIPPED | OUTPATIENT
Start: 2021-10-26 | End: 2022-04-27 | Stop reason: SDUPTHER

## 2021-10-27 ENCOUNTER — TELEPHONE (OUTPATIENT)
Dept: HEMATOLOGY ONCOLOGY | Facility: CLINIC | Age: 86
End: 2021-10-27

## 2021-11-08 ENCOUNTER — TELEPHONE (OUTPATIENT)
Dept: HEMATOLOGY ONCOLOGY | Facility: CLINIC | Age: 86
End: 2021-11-08

## 2021-11-08 ENCOUNTER — OFFICE VISIT (OUTPATIENT)
Dept: HEMATOLOGY ONCOLOGY | Facility: CLINIC | Age: 86
End: 2021-11-08
Payer: MEDICARE

## 2021-11-08 VITALS
RESPIRATION RATE: 17 BRPM | OXYGEN SATURATION: 98 % | TEMPERATURE: 98.4 F | HEIGHT: 68 IN | BODY MASS INDEX: 25.01 KG/M2 | SYSTOLIC BLOOD PRESSURE: 138 MMHG | WEIGHT: 165 LBS | DIASTOLIC BLOOD PRESSURE: 90 MMHG | HEART RATE: 73 BPM

## 2021-11-08 DIAGNOSIS — R91.8 LUNG MASS: Primary | ICD-10-CM

## 2021-11-08 DIAGNOSIS — Z85.038 HISTORY OF MALIGNANT NEOPLASM OF COLON: ICD-10-CM

## 2021-11-08 DIAGNOSIS — Z99.89 WALKER AS AMBULATION AID: ICD-10-CM

## 2021-11-08 PROCEDURE — 99214 OFFICE O/P EST MOD 30 MIN: CPT | Performed by: INTERNAL MEDICINE

## 2021-11-23 ENCOUNTER — PREP FOR PROCEDURE (OUTPATIENT)
Dept: INTERVENTIONAL RADIOLOGY/VASCULAR | Facility: CLINIC | Age: 86
End: 2021-11-23

## 2021-11-23 DIAGNOSIS — R91.8 LUNG MASS: Primary | ICD-10-CM

## 2021-12-21 ENCOUNTER — HOSPITAL ENCOUNTER (OUTPATIENT)
Dept: CT IMAGING | Facility: HOSPITAL | Age: 86
Discharge: HOME | End: 2021-12-21
Attending: RADIOLOGY
Payer: MEDICARE

## 2021-12-21 VITALS
BODY MASS INDEX: 25.33 KG/M2 | SYSTOLIC BLOOD PRESSURE: 171 MMHG | OXYGEN SATURATION: 95 % | TEMPERATURE: 97.9 F | HEIGHT: 68 IN | DIASTOLIC BLOOD PRESSURE: 85 MMHG | RESPIRATION RATE: 14 BRPM | WEIGHT: 167.11 LBS | HEART RATE: 78 BPM

## 2021-12-21 DIAGNOSIS — R91.8 LUNG MASS: ICD-10-CM

## 2021-12-21 LAB
BASOPHILS # BLD AUTO: 0.02 THOUSANDS/ΜL (ref 0–0.1)
BASOPHILS NFR BLD AUTO: 0 % (ref 0–1)
EOSINOPHIL # BLD AUTO: 0.3 THOUSAND/ΜL (ref 0–0.61)
EOSINOPHIL NFR BLD AUTO: 5 % (ref 0–6)
ERYTHROCYTE [DISTWIDTH] IN BLOOD BY AUTOMATED COUNT: 13 % (ref 11.6–15.1)
HCT VFR BLD AUTO: 38.7 % (ref 36.5–49.3)
HGB BLD-MCNC: 12.8 G/DL (ref 12–17)
IMM GRANULOCYTES # BLD AUTO: 0.02 THOUSAND/UL (ref 0–0.2)
IMM GRANULOCYTES NFR BLD AUTO: 0 % (ref 0–2)
INR PPP: 1.12 (ref 0.84–1.19)
LYMPHOCYTES # BLD AUTO: 2.02 THOUSANDS/ΜL (ref 0.6–4.47)
LYMPHOCYTES NFR BLD AUTO: 31 % (ref 14–44)
MCH RBC QN AUTO: 30.6 PG (ref 26.8–34.3)
MCHC RBC AUTO-ENTMCNC: 33.1 G/DL (ref 31.4–37.4)
MCV RBC AUTO: 93 FL (ref 82–98)
MONOCYTES # BLD AUTO: 0.79 THOUSAND/ΜL (ref 0.17–1.22)
MONOCYTES NFR BLD AUTO: 12 % (ref 4–12)
NEUTROPHILS # BLD AUTO: 3.32 THOUSANDS/ΜL (ref 1.85–7.62)
NEUTS SEG NFR BLD AUTO: 52 % (ref 43–75)
NRBC BLD AUTO-RTO: 0 /100 WBCS
PLATELET # BLD AUTO: 161 THOUSANDS/UL (ref 149–390)
PMV BLD AUTO: 10.3 FL (ref 8.9–12.7)
PROTHROMBIN TIME: 14.1 SECONDS (ref 11.6–14.5)
RBC # BLD AUTO: 4.18 MILLION/UL (ref 3.88–5.62)
WBC # BLD AUTO: 6.47 THOUSAND/UL (ref 4.31–10.16)

## 2021-12-21 PROCEDURE — 76380 CAT SCAN FOLLOW-UP STUDY: CPT | Performed by: RADIOLOGY

## 2021-12-21 PROCEDURE — 85025 COMPLETE CBC W/AUTO DIFF WBC: CPT | Performed by: RADIOLOGY

## 2021-12-21 PROCEDURE — 85610 PROTHROMBIN TIME: CPT | Performed by: RADIOLOGY

## 2021-12-21 RX ORDER — SODIUM CHLORIDE 9 MG/ML
75 INJECTION, SOLUTION INTRAVENOUS CONTINUOUS
Status: DISCONTINUED | OUTPATIENT
Start: 2021-12-21 | End: 2021-12-22 | Stop reason: HOSPADM

## 2021-12-21 RX ORDER — ASPIRIN 81 MG/1
81 TABLET ORAL DAILY
COMMUNITY

## 2022-04-22 ENCOUNTER — RA CDI HCC (OUTPATIENT)
Dept: OTHER | Facility: HOSPITAL | Age: 87
End: 2022-04-22

## 2022-04-22 NOTE — PROGRESS NOTES
I11 0  [facility] 75  coding opportunities          Chart Reviewed number of suggestions sent to Provider: 1     Patients Insurance     Medicare Insurance: [employer]
No

## 2022-04-27 ENCOUNTER — OFFICE VISIT (OUTPATIENT)
Dept: FAMILY MEDICINE CLINIC | Facility: CLINIC | Age: 87
End: 2022-04-27
Payer: MEDICARE

## 2022-04-27 VITALS
BODY MASS INDEX: 25.01 KG/M2 | SYSTOLIC BLOOD PRESSURE: 146 MMHG | DIASTOLIC BLOOD PRESSURE: 80 MMHG | WEIGHT: 165 LBS | TEMPERATURE: 97.8 F | HEIGHT: 68 IN | HEART RATE: 68 BPM | OXYGEN SATURATION: 97 %

## 2022-04-27 DIAGNOSIS — E11.8 TYPE 2 DIABETES MELLITUS WITH COMPLICATION, WITHOUT LONG-TERM CURRENT USE OF INSULIN (HCC): Primary | ICD-10-CM

## 2022-04-27 DIAGNOSIS — I71.4 ABDOMINAL AORTIC ANEURYSM (AAA) WITHOUT RUPTURE (HCC): ICD-10-CM

## 2022-04-27 DIAGNOSIS — E78.2 MIXED HYPERLIPIDEMIA: ICD-10-CM

## 2022-04-27 DIAGNOSIS — C18.6 CARCINOMA OF LEFT COLON (HCC): ICD-10-CM

## 2022-04-27 DIAGNOSIS — Z85.038 HISTORY OF MALIGNANT NEOPLASM OF COLON: ICD-10-CM

## 2022-04-27 DIAGNOSIS — I10 BENIGN ESSENTIAL HYPERTENSION: ICD-10-CM

## 2022-04-27 DIAGNOSIS — E78.1 HYPERTRIGLYCERIDEMIA: ICD-10-CM

## 2022-04-27 DIAGNOSIS — I50.22 CHRONIC SYSTOLIC HEART FAILURE (HCC): ICD-10-CM

## 2022-04-27 DIAGNOSIS — I25.10 CORONARY ARTERY DISEASE INVOLVING NATIVE CORONARY ARTERY OF NATIVE HEART WITHOUT ANGINA PECTORIS: ICD-10-CM

## 2022-04-27 DIAGNOSIS — E11.9 TYPE 2 DIABETES MELLITUS WITHOUT COMPLICATION, WITHOUT LONG-TERM CURRENT USE OF INSULIN (HCC): ICD-10-CM

## 2022-04-27 DIAGNOSIS — I48.0 PAROXYSMAL ATRIAL FIBRILLATION (HCC): ICD-10-CM

## 2022-04-27 PROCEDURE — 99214 OFFICE O/P EST MOD 30 MIN: CPT | Performed by: FAMILY MEDICINE

## 2022-04-27 RX ORDER — APIXABAN 5 MG/1
5 TABLET, FILM COATED ORAL 2 TIMES DAILY
Qty: 180 TABLET | Refills: 3 | Status: SHIPPED | OUTPATIENT
Start: 2022-04-27

## 2022-04-27 RX ORDER — ATORVASTATIN CALCIUM 40 MG/1
40 TABLET, FILM COATED ORAL
Qty: 90 TABLET | Refills: 3 | Status: SHIPPED | OUTPATIENT
Start: 2022-04-27

## 2022-04-27 RX ORDER — BRINZOLAMIDE/BRIMONIDINE TARTRATE 10; 2 MG/ML; MG/ML
SUSPENSION/ DROPS OPHTHALMIC EVERY 8 HOURS
COMMUNITY

## 2022-04-27 RX ORDER — LISINOPRIL 10 MG/1
15 TABLET ORAL DAILY
Qty: 135 TABLET | Refills: 3 | Status: SHIPPED | OUTPATIENT
Start: 2022-04-27

## 2022-04-27 RX ORDER — METOPROLOL SUCCINATE 100 MG/1
100 TABLET, EXTENDED RELEASE ORAL DAILY
Qty: 90 TABLET | Refills: 3 | Status: SHIPPED | OUTPATIENT
Start: 2022-04-27

## 2022-04-27 NOTE — ASSESSMENT & PLAN NOTE
Total cholesterol well controlled though triglycerides still elevated  Continue atorvastatin 40 mg daily

## 2022-04-27 NOTE — ASSESSMENT & PLAN NOTE
Lab Results   Component Value Date    HGBA1C 6 4 (H) 12/03/2021   Diabetic control good with hemoglobin A1c of 6 4  Continue metformin 500 mg b i d

## 2022-04-27 NOTE — PROGRESS NOTES
[facility]      NAME: Madeline Boss  AGE: 81 y o  SEX: male  : 1935   MRN: [id number]    DATE: 2022  TIME: 4:47 PM    Assessment and Plan     Problem List Items Addressed This Visit     Type 2 diabetes mellitus with complication, without long-term current use of insulin ([facility] ) - Primary       Lab Results   Component Value Date    HGBA1C 6 4 (H) 2021   Diabetic control good with hemoglobin A1c of 6 4  Continue metformin 500 mg b i d  Relevant Medications    metFORMIN (GLUCOPHAGE) 500 mg tablet    Other Relevant Orders    Hemoglobin A1C    Paroxysmal atrial fibrillation (HCC)     Stable  Continue permanent anticoagulation with Eliquis  Continue follow-up with Cardiology  Relevant Medications    Eliquis 5 MG    metoprolol succinate (TOPROL-XL) 100 mg 24 hr tablet    Hypertriglyceridemia     Total cholesterol well controlled though triglycerides still elevated  Continue atorvastatin 40 mg daily  Relevant Medications    atorvastatin (LIPITOR) 40 mg tablet    History of malignant neoplasm of colon     No evidence of disease recurrence  Continue follow-up with Hematology-Oncology  Coronary artery disease involving native coronary artery     Asymptomatic  Continue risk factor management and follow-up with Cardiology  Relevant Medications    metoprolol succinate (TOPROL-XL) 100 mg 24 hr tablet    Chronic systolic heart failure (HCC)    Relevant Medications    metoprolol succinate (TOPROL-XL) 100 mg 24 hr tablet    Carcinoma of left colon (HCC)    Benign essential hypertension     Well controlled on lisinopril 15 mg daily and metoprolol  mg daily           Relevant Medications    lisinopril (ZESTRIL) 10 mg tablet    metoprolol succinate (TOPROL-XL) 100 mg 24 hr tablet    Other Relevant Orders    Comprehensive metabolic panel    TSH, 3rd generation with Free T4 reflex    Abdominal aortic aneurysm (AAA) without rupture ([facility] 75 ) Other Visit Diagnoses     Mixed hyperlipidemia        Relevant Medications    atorvastatin (LIPITOR) 40 mg tablet    Type 2 diabetes mellitus without complication, without long-term current use of insulin (HCC)        Relevant Medications    metFORMIN (GLUCOPHAGE) 500 mg tablet              Return to office in:  6 months, annual wellness    Chief Complaint     Chief Complaint   Patient presents with    Follow-up       History of Present Illness     Patient was seen for routine follow-up of chronic medical problems  He is being treated for type 2 diabetes, hypertension, hyperlipidemia, paroxysmal atrial fibrillation, congestive heart failure  He has a history of colon cancer  He has no concerns at this time and feels well  He is compliant with his medications  The following portions of the patient's history were reviewed and updated as appropriate: allergies, current medications, past family history, past medical history, past social history, past surgical history and problem list     Review of Systems   Review of Systems   Constitutional: Negative  Respiratory: Negative  Cardiovascular: Negative  Gastrointestinal: Negative  Genitourinary: Negative  Musculoskeletal: Positive for gait problem (Uses walker)  Psychiatric/Behavioral: Negative          Active Problem List     Patient Active Problem List   Diagnosis    Benign essential hypertension    History of malignant neoplasm of colon    Chronic low back pain    Elevated CEA    Elevated transaminase level    Glaucoma    Positive depression screening    Type 2 diabetes mellitus with complication, without long-term current use of insulin (HCC)    Hypertriglyceridemia    Chronic arterial ischemic stroke    Chronic systolic heart failure (HCC)    Coronary artery disease involving native coronary artery    Paroxysmal atrial fibrillation (HCC)    Physical deconditioning    Status post placement of implantable loop recorder    Injury of right foot    Foot pain, right    Abdominal aortic aneurysm (AAA) without rupture ([address] )    Lung mass    Walker as ambulation aid    Carcinoma of left colon (HCC)       Objective   /80 (BP Location: Left arm, Patient Position: Sitting, Cuff Size: Standard)   Pulse 68   Temp 97 8 °F (36 6 °C) (Tympanic)   Ht 5' 8" (1 727 m)   Wt 74 8 kg (165 lb)   SpO2 97%   BMI 25 09 kg/m²     Physical Exam  Vitals and nursing note reviewed  Constitutional:       General: He is not in acute distress  Appearance: He is well-developed  He is not diaphoretic  HENT:      Head: Normocephalic and atraumatic  Eyes:      General:         Right eye: No discharge  Conjunctiva/sclera: Conjunctivae normal       Pupils: Pupils are equal, round, and reactive to light  Neck:      Thyroid: No thyromegaly  Cardiovascular:      Rate and Rhythm: Normal rate and regular rhythm  Pulmonary:      Effort: Pulmonary effort is normal  No respiratory distress  Breath sounds: Normal breath sounds  Musculoskeletal:      Cervical back: Normal range of motion  Lymphadenopathy:      Cervical: No cervical adenopathy  Skin:     General: Skin is warm and dry  Neurological:      Mental Status: He is alert and oriented to person, place, and time  Psychiatric:         Behavior: Behavior normal          Thought Content:  Thought content normal          Judgment: Judgment normal            Current Medications     Current Outpatient Medications:     acetaminophen (TYLENOL) 325 mg tablet, every 6 (six) hours, Disp: , Rfl:     aspirin (ECOTRIN LOW STRENGTH) 81 mg EC tablet, Take 81 mg by mouth daily, Disp: , Rfl:     atorvastatin (LIPITOR) 40 mg tablet, Take 1 tablet (40 mg total) by mouth daily at bedtime, Disp: 90 tablet, Rfl: 3    Brinzolamide-Brimonidine (Simbrinza) 1-0 2 % SUSP, Apply to eye every 8 (eight) hours, Disp: , Rfl:     Eliquis 5 MG, Take 1 tablet (5 mg total) by mouth 2 (two) times a day, Disp: 180 tablet, Rfl: 3    glucose blood (Wilmer Contour Test) test strip, Test Once Daily, Disp: 100 each, Rfl: 1    Lancets MISC, To test BS once daily  , Disp: 100 each, Rfl: 3    lisinopril (ZESTRIL) 10 mg tablet, Take 1 5 tablets (15 mg total) by mouth daily, Disp: 135 tablet, Rfl: 3    Lumigan 0 01 % ophthalmic drops, Administer into the left eye  , Disp: , Rfl:     metFORMIN (GLUCOPHAGE) 500 mg tablet, Take 1 tablet (500 mg total) by mouth every 12 (twelve) hours, Disp: 180 tablet, Rfl: 3    metoprolol succinate (TOPROL-XL) 100 mg 24 hr tablet, Take 1 tablet (100 mg total) by mouth daily, Disp: 90 tablet, Rfl: 3    nitroglycerin (NITROSTAT) 0 4 mg SL tablet, Place 0 4 mg under the tongue as needed, Disp: , Rfl:     meclizine (ANTIVERT) 25 mg tablet, Take 1 tablet (25 mg total) by mouth every 8 (eight) hours as needed for dizziness (Patient not taking: Reported on 4/27/2022 ), Disp: 30 tablet, Rfl: 0    methylPREDNISolone 4 MG tablet therapy pack, Use as directed on package (Patient not taking: Reported on 4/27/2022 ), Disp: 21 each, Rfl: 0    mirtazapine (REMERON) 15 mg tablet, Take 7 5 mg by mouth daily (Patient not taking: Reported on 4/27/2022 ), Disp: , Rfl:     Health Maintenance     Health Maintenance   Topic Date Due    Falls: Plan of Care  Never done    DTaP,Tdap,and Td Vaccines (1 - Tdap) 07/03/2015    Pneumococcal Vaccine: 65+ Years (1 of 2 - PPSV23) 09/15/2015    COVID-19 Vaccine (3 - Booster for Moderna series) 07/25/2021    PT PLAN OF CARE  09/22/2021    DM Eye Exam  04/08/2022    Diabetic Foot Exam  04/12/2022    HEMOGLOBIN A1C  06/03/2022    Depression Screening  10/26/2022    BMI: Followup Plan  10/26/2022    Fall Risk  10/26/2022    Medicare Annual Wellness Visit (AWV)  10/26/2022    BMI: Adult  04/27/2023    Influenza Vaccine  Completed    HIB Vaccine  Aged Out    Hepatitis B Vaccine  Aged Out    IPV Vaccine  Aged Out    Hepatitis A Vaccine  Aged C/ Marv Concepcion 19 Meningococcal ACWY Vaccine  Aged Out    HPV Vaccine  Aged Out     Immunization History   Administered Date(s) Administered    COVID-19 MODERNA VACC 0 5 ML IM 01/28/2021, 02/25/2021    INFLUENZA 09/09/2014, 10/02/2015, 10/04/2016, 11/01/2017, 10/05/2018, 11/07/2019, 08/28/2020, 10/03/2020, 10/14/2021    Influenza Quadrivalent, 6-35 Months IM 11/01/2017    Influenza Split High Dose Preservative Free IM 09/09/2014, 10/02/2015, 10/04/2016    Influenza, high dose seasonal 0 7 mL 10/03/2020    Pneumococcal 11/07/2019    Pneumococcal Conjugate 13-Valent 07/21/2015    TD (adult) Preservative Free 07/02/2015    Tetanus, adsorbed 07/02/2015    Zoster 01/05/2013    Zoster Vaccine Recombinant 05/20/2018    influenza, trivalent, adjuvanted 09/26/2019       Kristina Reynoso DO  [address]

## 2022-05-17 ENCOUNTER — TRANSITIONAL CARE MANAGEMENT (OUTPATIENT)
Dept: FAMILY MEDICINE CLINIC | Facility: CLINIC | Age: 87
End: 2022-05-17

## 2022-10-20 ENCOUNTER — RA CDI HCC (OUTPATIENT)
Dept: OTHER | Facility: HOSPITAL | Age: 87
End: 2022-10-20

## 2022-10-20 NOTE — PROGRESS NOTES
I11 0  [facility] 75  coding opportunities          Chart Reviewed number of suggestions sent to Provider: 1     Patients Insurance     Medicare Insurance: [employer]

## 2022-10-31 ENCOUNTER — OFFICE VISIT (OUTPATIENT)
Dept: FAMILY MEDICINE CLINIC | Facility: CLINIC | Age: 87
End: 2022-10-31

## 2022-10-31 VITALS
OXYGEN SATURATION: 97 % | DIASTOLIC BLOOD PRESSURE: 80 MMHG | BODY MASS INDEX: 23.72 KG/M2 | SYSTOLIC BLOOD PRESSURE: 150 MMHG | TEMPERATURE: 96.1 F | HEART RATE: 84 BPM | WEIGHT: 156 LBS

## 2022-10-31 DIAGNOSIS — I10 BENIGN ESSENTIAL HYPERTENSION: ICD-10-CM

## 2022-10-31 DIAGNOSIS — I50.22 CHRONIC SYSTOLIC HEART FAILURE (HCC): ICD-10-CM

## 2022-10-31 DIAGNOSIS — C18.6 CARCINOMA OF LEFT COLON (HCC): ICD-10-CM

## 2022-10-31 DIAGNOSIS — Z00.00 MEDICARE ANNUAL WELLNESS VISIT, SUBSEQUENT: ICD-10-CM

## 2022-10-31 DIAGNOSIS — H61.91 SKIN LESION OF RIGHT EAR: ICD-10-CM

## 2022-10-31 DIAGNOSIS — R74.01 TRANSAMINITIS: ICD-10-CM

## 2022-10-31 DIAGNOSIS — N18.31 STAGE 3A CHRONIC KIDNEY DISEASE (HCC): ICD-10-CM

## 2022-10-31 DIAGNOSIS — E11.9 TYPE 2 DIABETES MELLITUS WITHOUT COMPLICATION, WITHOUT LONG-TERM CURRENT USE OF INSULIN (HCC): ICD-10-CM

## 2022-10-31 DIAGNOSIS — Z85.038 HISTORY OF MALIGNANT NEOPLASM OF COLON: ICD-10-CM

## 2022-10-31 DIAGNOSIS — I48.0 PAROXYSMAL ATRIAL FIBRILLATION (HCC): ICD-10-CM

## 2022-10-31 DIAGNOSIS — I71.40 ABDOMINAL AORTIC ANEURYSM (AAA) WITHOUT RUPTURE, UNSPECIFIED PART: ICD-10-CM

## 2022-10-31 DIAGNOSIS — E78.1 HYPERTRIGLYCERIDEMIA: ICD-10-CM

## 2022-10-31 DIAGNOSIS — E11.8 TYPE 2 DIABETES MELLITUS WITH COMPLICATION, WITHOUT LONG-TERM CURRENT USE OF INSULIN (HCC): Primary | ICD-10-CM

## 2022-10-31 DIAGNOSIS — M35.3 PMR (POLYMYALGIA RHEUMATICA) (HCC): ICD-10-CM

## 2022-10-31 RX ORDER — PREDNISONE 10 MG/1
TABLET ORAL
Qty: 70 TABLET | Refills: 0 | Status: SHIPPED | OUTPATIENT
Start: 2022-10-31

## 2022-10-31 RX ORDER — LISINOPRIL 20 MG/1
20 TABLET ORAL DAILY
Qty: 90 TABLET | Refills: 3 | Status: SHIPPED | OUTPATIENT
Start: 2022-10-31

## 2022-10-31 RX ORDER — LANOLIN ALCOHOL/MO/W.PET/CERES
1000 CREAM (GRAM) TOPICAL DAILY
COMMUNITY

## 2022-10-31 RX ORDER — FAMOTIDINE 10 MG
10 TABLET ORAL AS NEEDED
COMMUNITY

## 2022-10-31 RX ORDER — CHOLECALCIFEROL (VITAMIN D3) 25 MCG
CAPSULE ORAL
COMMUNITY

## 2022-10-31 NOTE — ASSESSMENT & PLAN NOTE
Wt Readings from Last 3 Encounters:   10/31/22 70 8 kg (156 lb)   04/27/22 74 8 kg (165 lb)   12/21/21 75 8 kg (167 lb 1 7 oz)         Stable with no evidence fluid overload at this time

## 2022-10-31 NOTE — ASSESSMENT & PLAN NOTE
Lab Results   Component Value Date    EGFR 55 07/05/2021    EGFR >60 0 05/18/2017    CREATININE 1 20 07/05/2021    CREATININE 1 01 05/18/2017

## 2022-10-31 NOTE — PROGRESS NOTES
Name: Rico Rangel      : 1935      MRN: [id number]  Encounter Provider: Alexys Ly DO  Encounter Date: 10/31/2022   Encounter department: [address] [place] [address]     1  Type 2 diabetes mellitus with complication, without long-term current use of insulin [facility])  Assessment & Plan:    Lab Results   Component Value Date    HGBA1C 6 3 (H) 10/17/2022   Patient had been advised at the hospital to discontinue his metformin due to very good diabetic control however in light of possible prednisone treatment for his PMR, will continue metformin for the time being  2  History of malignant neoplasm of colon    3  Hypertriglyceridemia  Assessment & Plan:  Stable on atorvastatin  4  Chronic systolic heart failure (HCC)  Assessment & Plan:  Wt Readings from Last 3 Encounters:   10/31/22 70 8 kg (156 lb)   22 74 8 kg (165 lb)   21 75 8 kg (167 lb 1 7 oz)         Stable with no evidence fluid overload at this time  5  Paroxysmal atrial fibrillation (HCC)  Assessment & Plan:  Rate stable  Continue Eliquis 5 mg b i d  6  Abdominal aortic aneurysm (AAA) without rupture, unspecified part    7  Carcinoma of left colon ([facility] [address] )    8  Medicare annual wellness visit, subsequent  Comments:  Questionnaire reviewed  Immunization health screening history updated  Advance directive in place  9  Benign essential hypertension  Assessment & Plan:  Blood pressure elevated in the hospital and at today's visit  Increase lisinopril to 20 mg daily     Orders:  -     lisinopril (ZESTRIL) 20 mg tablet; Take 1 tablet (20 mg total) by mouth daily    10  PMR (polymyalgia rheumatica) (HCC)  Assessment & Plan:  Patient is C-reactive protein greater than 90 on recent labs while hospitalized  Patient does have multiple myalgias and arthralgias  Using a walker because of his symptoms    Will try abbreviated course prednisone with taper over 4 weeks starting from 40 mg     Orders:  -     predniSONE 10 mg tablet; Four pills daily for 7 days then 3 pills daily for 7 days then 2 pills daily for 7 days then 1 pill daily until finished    11  Stage 3a chronic kidney disease [facility])  Assessment & Plan:  Lab Results   Component Value Date    EGFR 55 07/05/2021    EGFR >60 0 05/18/2017    CREATININE 1 20 07/05/2021    CREATININE 1 01 05/18/2017         12  Type 2 diabetes mellitus without complication, without long-term current use of insulin (HCC)  -     metFORMIN (GLUCOPHAGE) 500 mg tablet; Take 1 tablet (500 mg total) by mouth every 12 (twelve) hours    13  Skin lesion of right ear  -     Ambulatory Referral to Plastic Surgery; Future    14  Transaminitis  -     Comprehensive metabolic panel; Future; Expected date: 10/31/2022           Subjective      Patient was for follow-up of chronic medical problems  Patient is being treated for type 2 diabetes, chronic kidney disease, atrial fibrillation, hyperlipidemia  Was recently admitted to the hospital for weakness  Workup revealed possible PMR  Patient was referred to Rheumatology and his metformin was discontinued due to hemoglobin A1c at 6 3  Review of Systems   Constitutional: Negative  Respiratory: Negative  Cardiovascular: Negative  Gastrointestinal: Negative  Genitourinary: Negative  Musculoskeletal: Negative  Psychiatric/Behavioral: Negative          Current Outpatient Medications on File Prior to Visit   Medication Sig   • acetaminophen (TYLENOL) 325 mg tablet every 6 (six) hours   • aspirin (ECOTRIN LOW STRENGTH) 81 mg EC tablet Take 81 mg by mouth daily   • atorvastatin (LIPITOR) 40 mg tablet Take 1 tablet (40 mg total) by mouth daily at bedtime   • Brinzolamide-Brimonidine (Simbrinza) 1-0 2 % SUSP Apply to eye every 8 (eight) hours   • Cholecalciferol (Vitamin D-3) 25 MCG (1000 UT) CAPS Take by mouth   • Eliquis 5 MG Take 1 tablet (5 mg total) by mouth 2 (two) times a day   • famotidine (PEPCID) 10 mg tablet Take 10 mg by mouth if needed for heartburn   • glucose blood (Wilmer Contour Test) test strip Test Once Daily   • Lancets MISC To test BS once daily  • Lumigan 0 01 % ophthalmic drops Administer into the left eye     • metoprolol succinate (TOPROL-XL) 100 mg 24 hr tablet Take 1 tablet (100 mg total) by mouth daily   • vitamin B-12 (VITAMIN B-12) 1,000 mcg tablet Take 1,000 mcg by mouth daily   • [DISCONTINUED] lisinopril (ZESTRIL) 10 mg tablet Take 1 5 tablets (15 mg total) by mouth daily   • [DISCONTINUED] metFORMIN (GLUCOPHAGE) 500 mg tablet Take 1 tablet (500 mg total) by mouth every 12 (twelve) hours   • meclizine (ANTIVERT) 25 mg tablet Take 1 tablet (25 mg total) by mouth every 8 (eight) hours as needed for dizziness (Patient not taking: No sig reported)   • mirtazapine (REMERON) 15 mg tablet Take 7 5 mg by mouth daily (Patient not taking: No sig reported)   • nitroglycerin (NITROSTAT) 0 4 mg SL tablet Place 0 4 mg under the tongue as needed   • [DISCONTINUED] methylPREDNISolone 4 MG tablet therapy pack Use as directed on package (Patient not taking: No sig reported)       Objective     /80 (BP Location: Left arm, Patient Position: Sitting, Cuff Size: Adult)   Pulse 84   Temp (!) 96 1 °F (35 6 °C)   Wt 70 8 kg (156 lb)   SpO2 97%   BMI 23 72 kg/m²     Physical Exam  Vitals and nursing note reviewed  Constitutional:       General: He is not in acute distress  Appearance: He is well-developed  He is not diaphoretic  HENT:      Head: Normocephalic and atraumatic  Eyes:      General:         Right eye: No discharge  Conjunctiva/sclera: Conjunctivae normal       Pupils: Pupils are equal, round, and reactive to light  Neck:      Thyroid: No thyromegaly  Cardiovascular:      Rate and Rhythm: Normal rate and regular rhythm  Pulmonary:      Effort: Pulmonary effort is normal  No respiratory distress  Breath sounds: Normal breath sounds     Musculoskeletal:      Cervical back: Normal range of motion  Lymphadenopathy:      Cervical: No cervical adenopathy  Skin:     General: Skin is warm and dry  Neurological:      Mental Status: He is alert and oriented to person, place, and time  Psychiatric:         Behavior: Behavior normal          Thought Content:  Thought content normal          Judgment: Judgment normal        Amber DO Umang

## 2022-10-31 NOTE — PROGRESS NOTES
Assessment and Plan:     Problem List Items Addressed This Visit     Type 2 diabetes mellitus with complication, without long-term current use of insulin (HCC) - Primary    Paroxysmal atrial fibrillation (HCC)    Hypertriglyceridemia    History of malignant neoplasm of colon    Chronic systolic heart failure ([facility] [address] )    Carcinoma of left colon (HCC)    Abdominal aortic aneurysm (AAA) without rupture      Other Visit Diagnoses     Medicare annual wellness visit, subsequent        Questionnaire reviewed  Immunization health screening history updated  Advance directive in place  Depression Screening and Follow-up Plan: Patient was screened for depression during today's encounter  They screened negative with a PHQ-2 score of 0  Preventive health issues were discussed with patient, and age appropriate screening tests were ordered as noted in patient's After Visit Summary  Personalized health advice and appropriate referrals for health education or preventive services given if needed, as noted in patient's After Visit Summary       History of Present Illness:     Patient presents for a Medicare Wellness Visit    HPI   Patient Care Team:  Alan Whitman DO as PCP - General (Family Medicine)  MD Lisa Alcazar MD     Review of Systems:     Review of Systems     Problem List:     Patient Active Problem List   Diagnosis   • Benign essential hypertension   • History of malignant neoplasm of colon   • Chronic low back pain   • Elevated CEA   • Elevated transaminase level   • Glaucoma   • Positive depression screening   • Type 2 diabetes mellitus with complication, without long-term current use of insulin (HCC)   • Hypertriglyceridemia   • Chronic arterial ischemic stroke   • Chronic systolic heart failure ([facility] [address] )   • Coronary artery disease involving native coronary artery   • Paroxysmal atrial fibrillation [facility])   • Physical deconditioning   • Status post placement of implantable loop recorder   • Injury of right foot   • Foot pain, right   • Abdominal aortic aneurysm (AAA) without rupture   • Lung mass   • Walker as ambulation aid   • Carcinoma of left colon [facility])      Past Medical and Surgical History:     Past Medical History:   Diagnosis Date   • Acute ischemic left MCA stroke ([facility] [address] ) 6/21/2019    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 MRI small bleed CT 8-19 no bleed No neurology follow up LINQ 6-19 PAF seen 11-19   Last Assessment & Plan:  Good recovery CVA- recent LINQ trasmission showed PAF: Would like to start Eliquis but because had CNS bleed needs neurology evaluation make sure ok  No change meds  • NICOLE (acute kidney injury) ([facility] [address] ) 4/9/2020   • Colon cancer [facility])     age 64's    colon resection     chemo pills   • Fall     fell backwards from riding -  in ER-ok- incidental finding CXR   R lobe 2 masses-bx today    12/21/2021   • Full dentures    • Hyperlipidemia    • Hypertension    • Lung mass     R side x2,bx today 12/21/2021   • Stroke ([facility] [address] )     6/2019     speech loss     minimal residual   • Vitreous hemorrhage of right eye ([facility] [address] ) 07/19/2016    R artificial eye   • Walker as ambulation aid    • Wears glasses      Past Surgical History:   Procedure Laterality Date   • CATARACT EXTRACTION     • COLONOSCOPY     • EYE SURGERY      R eye removed for hemorrhage      Family History:     Family History   Family history unknown: Yes      Social History:     Social History     Socioeconomic History   • Marital status: /Civil Union     Spouse name: None   • Number of children: None   • Years of education: None   • Highest education level: None   Occupational History   • None   Tobacco Use   • Smoking status: Former Smoker     Packs/day: 1 00     Years: 40 00     Pack years: 40 00   • Smokeless tobacco: Never Used   • Tobacco comment: quit 30 years ago (as of 12/2018)   Vaping Use   • Vaping Use: Never used   Substance and Sexual Activity   • Alcohol use:  Yes     Alcohol/week: 1 0 standard drink     Types: 1 Cans of beer per week     Comment: 4 x weekly   • Drug use: No   • Sexual activity: Not Currently   Other Topics Concern   • None   Social History Narrative   • None     Social Determinants of Health     Financial Resource Strain: Low Risk    • Difficulty of Paying Living Expenses: Not very hard   Food Insecurity: Not on file   Transportation Needs: No Transportation Needs   • Lack of Transportation (Medical): No   • Lack of Transportation (Non-Medical): No   Physical Activity: Not on file   Stress: Not on file   Social Connections: Not on file   Intimate Partner Violence: Not on file   Housing Stability: Not on file      Medications and Allergies:     Current Outpatient Medications   Medication Sig Dispense Refill   • acetaminophen (TYLENOL) 325 mg tablet every 6 (six) hours     • aspirin (ECOTRIN LOW STRENGTH) 81 mg EC tablet Take 81 mg by mouth daily     • atorvastatin (LIPITOR) 40 mg tablet Take 1 tablet (40 mg total) by mouth daily at bedtime 90 tablet 3   • Brinzolamide-Brimonidine (Simbrinza) 1-0 2 % SUSP Apply to eye every 8 (eight) hours     • Cholecalciferol (Vitamin D-3) 25 MCG (1000 UT) CAPS Take by mouth     • Eliquis 5 MG Take 1 tablet (5 mg total) by mouth 2 (two) times a day 180 tablet 3   • famotidine (PEPCID) 10 mg tablet Take 10 mg by mouth if needed for heartburn     • glucose blood (Wilmer Contour Test) test strip Test Once Daily 100 each 1   • Lancets MISC To test BS once daily   100 each 3   • lisinopril (ZESTRIL) 10 mg tablet Take 1 5 tablets (15 mg total) by mouth daily 135 tablet 3   • Lumigan 0 01 % ophthalmic drops Administer into the left eye       • metFORMIN (GLUCOPHAGE) 500 mg tablet Take 1 tablet (500 mg total) by mouth every 12 (twelve) hours 180 tablet 3   • metoprolol succinate (TOPROL-XL) 100 mg 24 hr tablet Take 1 tablet (100 mg total) by mouth daily 90 tablet 3   • vitamin B-12 (VITAMIN B-12) 1,000 mcg tablet Take 1,000 mcg by mouth daily     • meclizine (ANTIVERT) 25 mg tablet Take 1 tablet (25 mg total) by mouth every 8 (eight) hours as needed for dizziness (Patient not taking: No sig reported) 30 tablet 0   • methylPREDNISolone 4 MG tablet therapy pack Use as directed on package (Patient not taking: No sig reported) 21 each 0   • mirtazapine (REMERON) 15 mg tablet Take 7 5 mg by mouth daily (Patient not taking: No sig reported)     • nitroglycerin (NITROSTAT) 0 4 mg SL tablet Place 0 4 mg under the tongue as needed       No current facility-administered medications for this visit  No Known Allergies   Immunizations:     Immunization History   Administered Date(s) Administered   • COVID-19 MODERNA VACC 0 5 ML IM 01/28/2021, 02/25/2021, 10/27/2021, 07/15/2022   • INFLUENZA 09/09/2014, 10/02/2015, 10/04/2016, 11/01/2017, 10/05/2018, 11/07/2019, 08/28/2020, 10/03/2020, 10/14/2021   • Influenza Quadrivalent, 6-35 Months IM 11/01/2017   • Influenza Split High Dose Preservative Free IM 09/09/2014, 10/02/2015, 10/04/2016   • Influenza, high dose seasonal 0 7 mL 10/03/2020   • Pneumococcal 11/07/2019   • Pneumococcal Conjugate 13-Valent 07/21/2015   • TD (adult) Preservative Free 07/02/2015   • Tetanus, adsorbed 07/02/2015   • Zoster 01/05/2013   • Zoster Vaccine Recombinant 05/20/2018   • influenza, trivalent, adjuvanted 09/26/2019      Health Maintenance: There are no preventive care reminders to display for this patient  Topic Date Due   • Pneumococcal Vaccine: 65+ Years (2 - PPSV23 or PCV20) 11/07/2020   • Influenza Vaccine (1) 09/01/2022      Medicare Screening Tests and Risk Assessments:     Sarahhayden  is here for his Subsequent Wellness visit  Health Risk Assessment:   Patient rates overall health as fair  Patient feels that their physical health rating is same  Patient is satisfied with their life  Eyesight was rated as same  Hearing was rated as slightly worse  Patient feels that their emotional and mental health rating is same   Patients states they are often angry  Patient states they are sometimes unusually tired/fatigued  Pain experienced in the last 7 days has been some  Patient's pain rating has been 5/10  Patient states that he has experienced no weight loss or gain in last 6 months  Depression Screening:   PHQ-2 Score: 0      Fall Risk Screening: In the past year, patient has experienced: history of falling in past year    Number of falls: 1  Injured during fall?: No    Feels unsteady when standing or walking?: Yes    Worried about falling?: Yes      Home Safety:  Patient has trouble with stairs inside or outside of their home  Patient has working smoke alarms and has working carbon monoxide detector  Home safety hazards include: none  Nutrition:   Current diet is Regular  Medications:   Patient is currently taking over-the-counter supplements  OTC medications include: see medication list  Patient is not able to manage medications  Wife manages medications    Activities of Daily Living (ADLs)/Instrumental Activities of Daily Living (IADLs):   Walk and transfer into and out of bed and chair?: Yes  Dress and groom yourself?: Yes    Bathe or shower yourself?: Yes    Feed yourself? Yes  Do your laundry/housekeeping?: No  Manage your money, pay your bills and track your expenses?: No  Make your own meals?: No    Do your own shopping?: No    Previous Hospitalizations:   Any hospitalizations or ED visits within the last 12 months?: Yes    How many hospitalizations have you had in the last year?: 1-2    Advance Care Planning:   Living will: Yes    Durable POA for healthcare:  Yes    Advanced directive: Yes    Five wishes given: Yes      Cognitive Screening:   Provider or family/friend/caregiver concerned regarding cognition?: No    PREVENTIVE SCREENINGS      Cardiovascular Screening:    General: Screening Not Indicated and History Lipid Disorder      Diabetes Screening:     General: Screening Not Indicated and History Diabetes Colorectal Cancer Screening:     General: Screening Not Indicated and History Colorectal Cancer      Prostate Cancer Screening:    General: Screening Not Indicated      Osteoporosis Screening:    General: Screening Not Indicated      Abdominal Aortic Aneurysm (AAA) Screening:    Risk factors include: tobacco use        General: Screening Not Indicated and History AAA      Lung Cancer Screening:     General: Screening Not Indicated    Screening, Brief Intervention, and Referral to Treatment (SBIRT)    Screening  Typical number of drinks in a day: 0  Typical number of drinks in a week: 0  Interpretation: Low risk drinking behavior  Single Item Drug Screening:  How often have you used an illegal drug (including marijuana) or a prescription medication for non-medical reasons in the past year? never    Single Item Drug Screen Score: 0  Interpretation: Negative screen for possible drug use disorder    Brief Intervention  Alcohol & drug use screenings were reviewed  No concerns regarding substance use disorder identified       No exam data present     Physical Exam:     /80 (BP Location: Left arm, Patient Position: Sitting, Cuff Size: Adult)   Pulse 84   Temp (!) 96 1 °F (35 6 °C)   Wt 70 8 kg (156 lb)   SpO2 97%   BMI 23 72 kg/m²     Physical Exam     Gato Garcia DO

## 2022-10-31 NOTE — PATIENT INSTRUCTIONS
Medicare Preventive Visit Patient Instructions  Thank you for completing your Welcome to Medicare Visit or Medicare Annual Wellness Visit today  Your next wellness visit will be due in one year (11/1/2023)  The screening/preventive services that you may require over the next 5-10 years are detailed below  Some tests may not apply to you based off risk factors and/or age  Screening tests ordered at today's visit but not completed yet may show as past due  Also, please note that scanned in results may not display below  Preventive Screenings:  Service Recommendations Previous Testing/Comments   Colorectal Cancer Screening  · Colonoscopy    · Fecal Occult Blood Test (FOBT)/Fecal Immunochemical Test (FIT)  · Fecal DNA/Cologuard Test  · Flexible Sigmoidoscopy Age: 39-70 years old   Colonoscopy: every 10 years (May be performed more frequently if at higher risk)  OR  FOBT/FIT: every 1 year  OR  Cologuard: every 3 years  OR  Sigmoidoscopy: every 5 years  Screening may be recommended earlier than age 39 if at higher risk for colorectal cancer  Also, an individualized decision between you and your healthcare provider will decide whether screening between the ages of 74-80 would be appropriate   Colonoscopy: Not on file  FOBT/FIT: 12/26/2020  Cologuard: Not on file  Sigmoidoscopy: Not on file    Screening Not Indicated  History Colorectal Cancer     Prostate Cancer Screening Individualized decision between patient and health care provider in men between ages of 53-78   Medicare will cover every 12 months beginning on the day after your 50th birthday PSA: No results in last 5 years     Screening Not Indicated     Hepatitis C Screening Once for adults born between [birth year] and 1965  More frequently in patients at high risk for Hepatitis C Hep C Antibody: Not on file        Diabetes Screening 1-2 times per year if you're at risk for diabetes or have pre-diabetes Fasting glucose: No results in last 5 years (No results in last 5 years)  A1C: 6 3 % (10/17/2022)  Screening Not Indicated  History Diabetes   Cholesterol Screening Once every 5 years if you don't have a lipid disorder  May order more often based on risk factors  Lipid panel: 05/16/2022  Screening Not Indicated  History Lipid Disorder      Other Preventive Screenings Covered by Medicare:  1  Abdominal Aortic Aneurysm (AAA) Screening: covered once if your at risk  You're considered to be at risk if you have a family history of AAA or a male between the age of 73-68 who smoking at least 100 cigarettes in your lifetime  2  Lung Cancer Screening: covers low dose CT scan once per year if you meet all of the following conditions: (1) Age 50-69; (2) No signs or symptoms of lung cancer; (3) Current smoker or have quit smoking within the last 15 years; (4) You have a tobacco smoking history of at least 20 pack years (packs per day x number of years you smoked); (5) You get a written order from a healthcare provider  3  Glaucoma Screening: covered annually if you're considered high risk: (1) You have diabetes OR (2) Family history of glaucoma OR (3)  aged 48 and older OR (3)  American aged 72 and older  3  Osteoporosis Screening: covered every 2 years if you meet one of the following conditions: (1) Have a vertebral abnormality; (2) On glucocorticoid therapy for more than 3 months; (3) Have primary hyperparathyroidism; (4) On osteoporosis medications and need to assess response to drug therapy  5  HIV Screening: covered annually if you're between the age of 12-76  Also covered annually if you are younger than 13 and older than 72 with risk factors for HIV infection  For pregnant patients, it is covered up to 3 times per pregnancy      Immunizations:  Immunization Recommendations   Influenza Vaccine Annual influenza vaccination during flu season is recommended for all persons aged >= 6 months who do not have contraindications   Pneumococcal Vaccine   * Pneumococcal conjugate vaccine = PCV13 (Prevnar 13), PCV15 (Vaxneuvance), PCV20 (Prevnar 20)  * Pneumococcal polysaccharide vaccine = PPSV23 (Pneumovax) Adults 2364 years old: 1-3 doses may be recommended based on certain risk factors  Adults 72 years old: 1-2 doses may be recommended based off what pneumonia vaccine you previously received   Hepatitis B Vaccine 3 dose series if at intermediate or high risk (ex: diabetes, end stage renal disease, liver disease)   Tetanus (Td) Vaccine - COST NOT COVERED BY MEDICARE PART B Following completion of primary series, a booster dose should be given every 10 years to maintain immunity against tetanus  Td may also be given as tetanus wound prophylaxis  Tdap Vaccine - COST NOT COVERED BY MEDICARE PART B Recommended at least once for all adults  For pregnant patients, recommended with each pregnancy  Shingles Vaccine (Shingrix) - COST NOT COVERED BY MEDICARE PART B  2 shot series recommended in those aged 48 and above     Health Maintenance Due:  There are no preventive care reminders to display for this patient  Immunizations Due:      Topic Date Due   • Pneumococcal Vaccine: 65+ Years (2 - PPSV23 or PCV20) 11/07/2020   • Influenza Vaccine (1) 09/01/2022     Advance Directives   What are advance directives? Advance directives are legal documents that state your wishes and plans for medical care  These plans are made ahead of time in case you lose your ability to make decisions for yourself  Advance directives can apply to any medical decision, such as the treatments you want, and if you want to donate organs  What are the types of advance directives? There are many types of advance directives, and each state has rules about how to use them  You may choose a combination of any of the following:  · Living will: This is a written record of the treatment you want  You can also choose which treatments you do not want, which to limit, and which to stop at a certain time   This includes surgery, medicine, IV fluid, and tube feedings  · Durable power of  for healthcare [facility]): This is a written record that states who you want to make healthcare choices for you when you are unable to make them for yourself  This person, called a proxy, is usually a family member or a friend  You may choose more than 1 proxy  · Do not resuscitate (DNR) order:  A DNR order is used in case your heart stops beating or you stop breathing  It is a request not to have certain forms of treatment, such as CPR  A DNR order may be included in other types of advance directives  · Medical directive: This covers the care that you want if you are in a coma, near death, or unable to make decisions for yourself  You can list the treatments you want for each condition  Treatment may include pain medicine, surgery, blood transfusions, dialysis, IV or tube feedings, and a ventilator (breathing machine)  · Values history: This document has questions about your views, beliefs, and how you feel and think about life  This information can help others choose the care that you would choose  Why are advance directives important? An advance directive helps you control your care  Although spoken wishes may be used, it is better to have your wishes written down  Spoken wishes can be misunderstood, or not followed  Treatments may be given even if you do not want them  An advance directive may make it easier for your family to make difficult choices about your care  Fall Prevention    Fall prevention  includes ways to make your home and other areas safer  It also includes ways you can move more carefully to prevent a fall  Health conditions that cause changes in your blood pressure, vision, or muscle strength and coordination may increase your risk for falls  Medicines may also increase your risk for falls if they make you dizzy, weak, or sleepy  Fall prevention tips:   · Stand or sit up slowly      · Use assistive devices as directed  · Wear shoes that fit well and have soles that   · Wear a personal alarm  · Stay active  · Manage your medical conditions  Home Safety Tips:  · Add items to prevent falls in the bathroom  · Keep paths clear  · Install bright lights in your home  · Keep items you use often on shelves within reach  · Paint or place reflective tape on the edges of your stairs  © Copyright [facility] 2018 Information is for End User's use only and may not be sold, redistributed or otherwise used for commercial purposes   All illustrations and images included in CareNotes® are the copyrighted property of A D A M , Inc  or [address] [place] [address]

## 2022-10-31 NOTE — ASSESSMENT & PLAN NOTE
Patient is C-reactive protein greater than 90 on recent labs while hospitalized  Patient does have multiple myalgias and arthralgias  Using a walker because of his symptoms  Will try abbreviated course prednisone with taper over 4 weeks starting from 40 mg

## 2022-10-31 NOTE — ASSESSMENT & PLAN NOTE
Lab Results   Component Value Date    HGBA1C 6 3 (H) 10/17/2022   Patient had been advised at the hospital to discontinue his metformin due to very good diabetic control however in light of possible prednisone treatment for his PMR, will continue metformin for the time being

## 2022-11-04 ENCOUNTER — TELEPHONE (OUTPATIENT)
Dept: FAMILY MEDICINE CLINIC | Facility: CLINIC | Age: 87
End: 2022-11-04

## 2022-11-04 NOTE — TELEPHONE ENCOUNTER
Betina from [place] [facility]  asking if we remove staples and if we would see him      I called [facility] and scheduled an appointment to see Dr Mukesh Lim 11/9/22 for staple removal

## 2022-11-09 ENCOUNTER — OFFICE VISIT (OUTPATIENT)
Dept: FAMILY MEDICINE CLINIC | Facility: CLINIC | Age: 87
End: 2022-11-09

## 2022-11-09 VITALS
DIASTOLIC BLOOD PRESSURE: 82 MMHG | OXYGEN SATURATION: 98 % | SYSTOLIC BLOOD PRESSURE: 148 MMHG | TEMPERATURE: 96.3 F | HEART RATE: 97 BPM

## 2022-11-09 DIAGNOSIS — S01.01XD LACERATION OF SCALP, SUBSEQUENT ENCOUNTER: Primary | ICD-10-CM

## 2022-11-09 NOTE — PROGRESS NOTES
Name: Bekah Irving      : 1935      MRN: [id number]  Encounter Provider: Leila Franco DO  Encounter Date: 2022   Encounter department: [address] [place] [address]  Laceration of scalp, subsequent encounter  Comments:  Wound well healed  No sign of infection  Surgical staples removed without difficulty  Subjective      Patient presents for recheck of scalp laceration and removal of staples  Injury occurred 1 week ago with placement of staples in midline posterior scalp  Review of Systems   Skin: Positive for wound  Current Outpatient Medications on File Prior to Visit   Medication Sig   • acetaminophen (TYLENOL) 325 mg tablet every 6 (six) hours   • aspirin (ECOTRIN LOW STRENGTH) 81 mg EC tablet Take 81 mg by mouth daily   • atorvastatin (LIPITOR) 40 mg tablet Take 1 tablet (40 mg total) by mouth daily at bedtime   • Brinzolamide-Brimonidine (Simbrinza) 1-0 2 % SUSP Apply to eye every 8 (eight) hours   • Cholecalciferol (Vitamin D-3) 25 MCG (1000 UT) CAPS Take by mouth   • Eliquis 5 MG Take 1 tablet (5 mg total) by mouth 2 (two) times a day   • famotidine (PEPCID) 10 mg tablet Take 10 mg by mouth if needed for heartburn   • glucose blood (Wilmer Contour Test) test strip Test Once Daily   • Lancets MISC To test BS once daily     • lisinopril (ZESTRIL) 20 mg tablet Take 1 tablet (20 mg total) by mouth daily   • Lumigan 0 01 % ophthalmic drops Administer into the left eye     • metFORMIN (GLUCOPHAGE) 500 mg tablet Take 1 tablet (500 mg total) by mouth every 12 (twelve) hours   • metoprolol succinate (TOPROL-XL) 100 mg 24 hr tablet Take 1 tablet (100 mg total) by mouth daily   • predniSONE 10 mg tablet Four pills daily for 7 days then 3 pills daily for 7 days then 2 pills daily for 7 days then 1 pill daily until finished   • vitamin B-12 (VITAMIN B-12) 1,000 mcg tablet Take 1,000 mcg by mouth daily   • meclizine (ANTIVERT) 25 mg tablet Take 1 tablet (25 mg total) by mouth every 8 (eight) hours as needed for dizziness (Patient not taking: No sig reported)   • mirtazapine (REMERON) 15 mg tablet Take 7 5 mg by mouth daily (Patient not taking: No sig reported)   • nitroglycerin (NITROSTAT) 0 4 mg SL tablet Place 0 4 mg under the tongue as needed       Objective     /82 (BP Location: Left arm, Patient Position: Sitting, Cuff Size: Adult)   Pulse 97   Temp (!) 96 3 °F (35 7 °C)   SpO2 98%     Physical Exam  Skin:     Comments:  Well healed laceration midline posterior scalp       Kaycee Quinones DO

## 2022-12-05 ENCOUNTER — OFFICE VISIT (OUTPATIENT)
Dept: FAMILY MEDICINE CLINIC | Facility: CLINIC | Age: 87
End: 2022-12-05

## 2022-12-05 VITALS
HEART RATE: 100 BPM | TEMPERATURE: 96.4 F | DIASTOLIC BLOOD PRESSURE: 60 MMHG | OXYGEN SATURATION: 99 % | SYSTOLIC BLOOD PRESSURE: 110 MMHG

## 2022-12-05 DIAGNOSIS — E78.1 HYPERTRIGLYCERIDEMIA: Primary | ICD-10-CM

## 2022-12-05 DIAGNOSIS — E11.8 TYPE 2 DIABETES MELLITUS WITH COMPLICATION, WITHOUT LONG-TERM CURRENT USE OF INSULIN (HCC): ICD-10-CM

## 2022-12-05 DIAGNOSIS — I50.22 CHRONIC SYSTOLIC HEART FAILURE (HCC): ICD-10-CM

## 2022-12-05 DIAGNOSIS — I25.10 CORONARY ARTERY DISEASE INVOLVING NATIVE CORONARY ARTERY OF NATIVE HEART WITHOUT ANGINA PECTORIS: ICD-10-CM

## 2022-12-05 DIAGNOSIS — N18.31 STAGE 3A CHRONIC KIDNEY DISEASE (HCC): ICD-10-CM

## 2022-12-05 DIAGNOSIS — I10 BENIGN ESSENTIAL HYPERTENSION: ICD-10-CM

## 2022-12-05 DIAGNOSIS — E11.9 TYPE 2 DIABETES MELLITUS WITHOUT COMPLICATION, WITHOUT LONG-TERM CURRENT USE OF INSULIN (HCC): ICD-10-CM

## 2022-12-05 DIAGNOSIS — I48.0 PAROXYSMAL ATRIAL FIBRILLATION (HCC): ICD-10-CM

## 2022-12-05 PROBLEM — M35.3 PMR (POLYMYALGIA RHEUMATICA) (HCC): Status: RESOLVED | Noted: 2022-10-31 | Resolved: 2022-12-05

## 2022-12-05 NOTE — PROGRESS NOTES
Name: Isidra Castorena      : 1935      MRN: [id number]  Encounter Provider: Omkar Reyes DO  Encounter Date: 2022   Encounter department: [address] [place] [address]     1  Hypertriglyceridemia  Assessment & Plan:  Recheck lipid panel with next labs in May  Continue atorvastatin 40 mg daily    Orders:  -     Comprehensive metabolic panel; Future; Expected date: 2023  -     Lipid Panel with Direct LDL reflex; Future; Expected date: 2023  -     TSH, 3rd generation; Future; Expected date: 2023    2  Type 2 diabetes mellitus without complication, without long-term current use of insulin ([facility])  -     metFORMIN (GLUCOPHAGE) 500 mg tablet; Take 1 tablet (500 mg total) by mouth daily with breakfast  -     Hemoglobin A1C; Future; Expected date: 2023    3  Benign essential hypertension  Assessment & Plan:  Blood pressure stable  Improved on lisinopril 20 mg  Continue current dosage      4  Chronic systolic heart failure ([address] )    5  Coronary artery disease involving native coronary artery of native heart without angina pectoris    6  Paroxysmal atrial fibrillation (HCC)    7  Stage 3a chronic kidney disease [facility])  Assessment & Plan:  Lab Results   Component Value Date    EGFR 55 2021    EGFR >60 0 2017    CREATININE 1 20 2021    CREATININE 1 01 2017   Mild elevation of creatinine  Continue to avoid regular use of NSAIDs and nephrotoxic drugs and IV contrast   Be sure to hydrate well      8  Type 2 diabetes mellitus with complication, without long-term current use of insulin ([facility])  Assessment & Plan:    Lab Results   Component Value Date    HGBA1C 6 3 (H) 10/17/2022   Blood sugars remain mildly elevated  Now that he is completed his prednisone will decrease metformin from 500 b i d  to 500 mg daily  Subjective      Patient was seen for routine follow-up of chronic medical problems    This is also a follow-up from 1 month ago when he had his post hospital check  Was treated at that time with prednisone from the hospital with a presumed diagnosis of polymyalgia rheumatica  He was on prednisone which she finished a proximally 2 weeks ago  He notes no change in symptoms being on or off of the prednisone  He has low back pain and some gait issues but no other concerns  His blood pressure medicine was adjusted with lisinopril increasing from 10-20 mg at his last visit  He has been taking metformin 500 b i d  due to the fact that his sugars were somewhat elevated while on prednisone  Review of Systems   Constitutional: Negative  Respiratory: Negative  Cardiovascular: Negative  Gastrointestinal: Negative  Genitourinary: Negative  Musculoskeletal: Negative  Psychiatric/Behavioral: Negative  Current Outpatient Medications on File Prior to Visit   Medication Sig   • acetaminophen (TYLENOL) 325 mg tablet every 6 (six) hours   • aspirin (ECOTRIN LOW STRENGTH) 81 mg EC tablet Take 81 mg by mouth daily   • atorvastatin (LIPITOR) 40 mg tablet Take 1 tablet (40 mg total) by mouth daily at bedtime   • Brinzolamide-Brimonidine (Simbrinza) 1-0 2 % SUSP Apply to eye every 8 (eight) hours   • Cholecalciferol (Vitamin D-3) 25 MCG (1000 UT) CAPS Take by mouth   • Eliquis 5 MG Take 1 tablet (5 mg total) by mouth 2 (two) times a day   • famotidine (PEPCID) 10 mg tablet Take 10 mg by mouth if needed for heartburn   • glucose blood (Wilmer Contour Test) test strip Test Once Daily   • Lancets MISC To test BS once daily     • lisinopril (ZESTRIL) 20 mg tablet Take 1 tablet (20 mg total) by mouth daily   • Lumigan 0 01 % ophthalmic drops Administer into the left eye     • metoprolol succinate (TOPROL-XL) 100 mg 24 hr tablet Take 1 tablet (100 mg total) by mouth daily   • vitamin B-12 (VITAMIN B-12) 1,000 mcg tablet Take 1,000 mcg by mouth daily   • [DISCONTINUED] metFORMIN (GLUCOPHAGE) 500 mg tablet Take 1 tablet (500 mg total) by mouth every 12 (twelve) hours   • meclizine (ANTIVERT) 25 mg tablet Take 1 tablet (25 mg total) by mouth every 8 (eight) hours as needed for dizziness (Patient not taking: Reported on 4/27/2022)   • mirtazapine (REMERON) 15 mg tablet Take 7 5 mg by mouth daily (Patient not taking: Reported on 4/27/2022)   • nitroglycerin (NITROSTAT) 0 4 mg SL tablet Place 0 4 mg under the tongue as needed   • predniSONE 10 mg tablet Four pills daily for 7 days then 3 pills daily for 7 days then 2 pills daily for 7 days then 1 pill daily until finished (Patient not taking: Reported on 12/5/2022)       Objective     /60 (BP Location: Left arm, Patient Position: Sitting, Cuff Size: Adult)   Pulse 100   Temp (!) 96 4 °F (35 8 °C)   SpO2 99%     Physical Exam  Vitals and nursing note reviewed  Constitutional:       General: He is not in acute distress  Appearance: He is well-developed and well-nourished  He is not diaphoretic  HENT:      Head: Normocephalic and atraumatic  Eyes:      General:         Right eye: No discharge  Conjunctiva/sclera: Conjunctivae normal       Pupils: Pupils are equal, round, and reactive to light  Neck:      Thyroid: No thyromegaly  Cardiovascular:      Rate and Rhythm: Normal rate and regular rhythm  Pulmonary:      Effort: Pulmonary effort is normal  No respiratory distress  Breath sounds: Normal breath sounds  Musculoskeletal:      Cervical back: Normal range of motion  Lymphadenopathy:      Cervical: No cervical adenopathy  Skin:     General: Skin is warm and dry  Neurological:      Mental Status: He is alert and oriented to person, place, and time  Psychiatric:         Mood and Affect: Mood and affect normal          Behavior: Behavior normal          Thought Content:  Thought content normal          Judgment: Judgment normal        Peyton Mirna, DO

## 2022-12-05 NOTE — ASSESSMENT & PLAN NOTE
Lab Results   Component Value Date    EGFR 55 07/05/2021    EGFR >60 0 05/18/2017    CREATININE 1 20 07/05/2021    CREATININE 1 01 05/18/2017   Mild elevation of creatinine    Continue to avoid regular use of NSAIDs and nephrotoxic drugs and IV contrast   Be sure to hydrate well

## 2022-12-05 NOTE — ASSESSMENT & PLAN NOTE
Lab Results   Component Value Date    HGBA1C 6 3 (H) 10/17/2022   Blood sugars remain mildly elevated  Now that he is completed his prednisone will decrease metformin from 500 b i d  to 500 mg daily

## 2023-01-21 NOTE — TELEPHONE ENCOUNTER
Last appt: 4/12/21  Next appt: 9/28/21 F: NS @100  E; replete as needed, close monitoring in setting of rhabdo  N: regular   DVT: lovenox     Dispo: [facility] - unknown psychiatric illness  - home medications include: trazodone 100mg, trilafon 8mg qid, restoril 30mg qhs   - continue home medications   - medications confirmed with [facility]

## 2023-01-31 ENCOUNTER — TRANSITIONAL CARE MANAGEMENT (OUTPATIENT)
Dept: FAMILY MEDICINE CLINIC | Facility: CLINIC | Age: 88
End: 2023-01-31

## 2023-02-14 ENCOUNTER — TELEPHONE (OUTPATIENT)
Dept: FAMILY MEDICINE CLINIC | Facility: CLINIC | Age: 88
End: 2023-02-14

## 2023-02-14 NOTE — TELEPHONE ENCOUNTER
[facility] called to confirm patient is a patient of Dr Luo Seen     Call back number : [phone]

## 2023-02-15 ENCOUNTER — OFFICE VISIT (OUTPATIENT)
Dept: FAMILY MEDICINE CLINIC | Facility: CLINIC | Age: 88
End: 2023-02-15

## 2023-02-15 VITALS
BODY MASS INDEX: 22.2 KG/M2 | TEMPERATURE: 98.9 F | SYSTOLIC BLOOD PRESSURE: 136 MMHG | OXYGEN SATURATION: 97 % | DIASTOLIC BLOOD PRESSURE: 70 MMHG | HEART RATE: 98 BPM | WEIGHT: 146 LBS

## 2023-02-15 DIAGNOSIS — I71.40 ABDOMINAL AORTIC ANEURYSM (AAA) WITHOUT RUPTURE, UNSPECIFIED PART: ICD-10-CM

## 2023-02-15 DIAGNOSIS — E78.1 HYPERTRIGLYCERIDEMIA: ICD-10-CM

## 2023-02-15 DIAGNOSIS — I48.0 PAROXYSMAL ATRIAL FIBRILLATION (HCC): ICD-10-CM

## 2023-02-15 DIAGNOSIS — E11.8 TYPE 2 DIABETES MELLITUS WITH COMPLICATION, WITHOUT LONG-TERM CURRENT USE OF INSULIN (HCC): ICD-10-CM

## 2023-02-15 DIAGNOSIS — N18.31 STAGE 3A CHRONIC KIDNEY DISEASE (HCC): ICD-10-CM

## 2023-02-15 DIAGNOSIS — I25.10 CORONARY ARTERY DISEASE INVOLVING NATIVE CORONARY ARTERY OF NATIVE HEART WITHOUT ANGINA PECTORIS: ICD-10-CM

## 2023-02-15 DIAGNOSIS — I50.22 CHRONIC SYSTOLIC HEART FAILURE (HCC): ICD-10-CM

## 2023-02-15 DIAGNOSIS — S32.029D CLOSED FRACTURE OF SECOND LUMBAR VERTEBRA WITH ROUTINE HEALING, UNSPECIFIED FRACTURE MORPHOLOGY, SUBSEQUENT ENCOUNTER: Primary | ICD-10-CM

## 2023-02-15 DIAGNOSIS — I10 BENIGN ESSENTIAL HYPERTENSION: ICD-10-CM

## 2023-02-15 PROBLEM — S32.029A CLOSED FRACTURE OF SECOND LUMBAR VERTEBRA (HCC): Status: ACTIVE | Noted: 2022-12-08

## 2023-02-15 NOTE — PROGRESS NOTES
Name: Norman Sevilla      : 1935      MRN: [id number]  Encounter Provider: Bhumi Logan DO  Encounter Date: 2/15/2023   Encounter department: [address] [place] [address]     1  Closed fracture of second lumbar vertebra with routine healing, unspecified fracture morphology, subsequent encounter    2  Abdominal aortic aneurysm (AAA) without rupture, unspecified part    3  Benign essential hypertension    4  Chronic systolic heart failure ([facility] )    5  Coronary artery disease involving native coronary artery of native heart without angina pectoris    6  Hypertriglyceridemia    7  Paroxysmal atrial fibrillation (HCC)    8  Stage 3a chronic kidney disease ([facility] )    9  Type 2 diabetes mellitus with complication, without long-term current use of insulin [facility])     Reviewed rehabilitation stay  Updated medication list   Discussed end-of-life wishes  POLST form provided to be scanned into record  Patient to continue with PT OT and skilled nursing home care services  Patient to continue with current medications and return in  for previously scheduled checkup  Subjective      Patient was seen in follow-up after discharge from rehabilitation facility  He was admitted briefly to the hospital for observation after a fall at home  He was found to have a lumbar spine fracture of undetermined age  He was admitted to rehab and made sufficient progress to be discharged to home  He has been home for approximately 2 weeks  Receiving PT OT and skilled nursing services and making progress  Appetite good  No problem with bowel or bladder habit  Compliant with medications  Review of Systems   Constitutional: Negative  Respiratory: Negative  Cardiovascular: Negative  Gastrointestinal: Negative  Genitourinary: Negative  Musculoskeletal: Positive for arthralgias, back pain and gait problem (Walker or wheelchair dependent)  Psychiatric/Behavioral: Negative  Current Outpatient Medications on File Prior to Visit   Medication Sig   • acetaminophen (TYLENOL) 325 mg tablet every 6 (six) hours   • aspirin (ECOTRIN LOW STRENGTH) 81 mg EC tablet Take 81 mg by mouth daily   • atorvastatin (LIPITOR) 40 mg tablet Take 1 tablet (40 mg total) by mouth daily at bedtime   • Brinzolamide-Brimonidine (Simbrinza) 1-0 2 % SUSP Apply to eye every 8 (eight) hours   • Cholecalciferol (Vitamin D-3) 25 MCG (1000 UT) CAPS Take by mouth   • Eliquis 5 MG Take 1 tablet (5 mg total) by mouth 2 (two) times a day   • famotidine (PEPCID) 10 mg tablet Take 10 mg by mouth if needed for heartburn   • glucose blood (Wilmer Contour Test) test strip Test Once Daily   • Lancets MISC To test BS once daily     • lisinopril (ZESTRIL) 20 mg tablet Take 1 tablet (20 mg total) by mouth daily   • Lumigan 0 01 % ophthalmic drops Administer into the left eye     • metFORMIN (GLUCOPHAGE) 500 mg tablet Take 1 tablet (500 mg total) by mouth daily with breakfast   • metoprolol succinate (TOPROL-XL) 100 mg 24 hr tablet Take 1 tablet (100 mg total) by mouth daily (Patient taking differently: Take 50 mg by mouth daily)   • vitamin B-12 (VITAMIN B-12) 1,000 mcg tablet Take 1,000 mcg by mouth daily   • meclizine (ANTIVERT) 25 mg tablet Take 1 tablet (25 mg total) by mouth every 8 (eight) hours as needed for dizziness (Patient not taking: Reported on 4/27/2022)   • mirtazapine (REMERON) 15 mg tablet Take 7 5 mg by mouth daily (Patient not taking: Reported on 4/27/2022)   • nitroglycerin (NITROSTAT) 0 4 mg SL tablet Place 0 4 mg under the tongue as needed   • predniSONE 10 mg tablet Four pills daily for 7 days then 3 pills daily for 7 days then 2 pills daily for 7 days then 1 pill daily until finished (Patient not taking: Reported on 12/5/2022)       Objective     /70 (BP Location: Left arm, Patient Position: Sitting, Cuff Size: Adult)   Pulse 98   Temp 98 9 °F (37 2 °C)   Wt 66 2 kg (146 lb) Comment: patient reported  SpO2 97%   BMI 22 20 kg/m²     Physical Exam  Vitals and nursing note reviewed  Constitutional:       General: He is not in acute distress  Appearance: He is well-developed  He is not diaphoretic  HENT:      Head: Normocephalic and atraumatic  Eyes:      General:         Right eye: No discharge  Conjunctiva/sclera: Conjunctivae normal       Pupils: Pupils are equal, round, and reactive to light  Neck:      Thyroid: No thyromegaly  Cardiovascular:      Rate and Rhythm: Normal rate and regular rhythm  Pulmonary:      Effort: Pulmonary effort is normal  No respiratory distress  Breath sounds: Normal breath sounds  Musculoskeletal:      Cervical back: Normal range of motion  Lymphadenopathy:      Cervical: No cervical adenopathy  Skin:     General: Skin is warm and dry  Neurological:      Mental Status: He is alert and oriented to person, place, and time  Psychiatric:         Behavior: Behavior normal          Thought Content:  Thought content normal          Judgment: Judgment normal        Claude Bis, DO

## 2023-03-20 ENCOUNTER — RA CDI HCC (OUTPATIENT)
Dept: OTHER | Facility: HOSPITAL | Age: 88
End: 2023-03-20

## 2023-03-20 NOTE — PROGRESS NOTES
I13 0  E11 [id number]  [facility]  coding opportunities          Chart Reviewed number of suggestions sent to Provider: 2     Patients Insurance     Medicare Insurance: [employer]

## 2023-03-24 ENCOUNTER — OFFICE VISIT (OUTPATIENT)
Dept: FAMILY MEDICINE CLINIC | Facility: CLINIC | Age: 88
End: 2023-03-24

## 2023-03-24 VITALS
HEART RATE: 72 BPM | TEMPERATURE: 97.7 F | SYSTOLIC BLOOD PRESSURE: 130 MMHG | DIASTOLIC BLOOD PRESSURE: 70 MMHG | WEIGHT: 170 LBS | RESPIRATION RATE: 16 BRPM | OXYGEN SATURATION: 97 % | BODY MASS INDEX: 25.85 KG/M2

## 2023-03-24 DIAGNOSIS — M25.511 CHRONIC RIGHT SHOULDER PAIN: Primary | ICD-10-CM

## 2023-03-24 DIAGNOSIS — G89.29 CHRONIC RIGHT SHOULDER PAIN: Primary | ICD-10-CM

## 2023-03-24 RX ORDER — TRIAMCINOLONE ACETONIDE 40 MG/ML
80 INJECTION, SUSPENSION INTRA-ARTICULAR; INTRAMUSCULAR ONCE
Status: COMPLETED | OUTPATIENT
Start: 2023-03-24 | End: 2023-03-24

## 2023-03-24 RX ADMIN — TRIAMCINOLONE ACETONIDE 80 MG: 40 INJECTION, SUSPENSION INTRA-ARTICULAR; INTRAMUSCULAR at 14:25

## 2023-03-24 NOTE — PROGRESS NOTES
Patient with chronic right shoulder pain requests injection  Large joint arthrocentesis: R glenohumeral  [place] Protocol:  Consent: Verbal consent obtained    Risks and benefits: risks, benefits and alternatives were discussed  Consent given by: patient  Patient understanding: patient states understanding of the procedure being performed  Patient identity confirmed: verbally with patient    Supporting Documentation  Indications: pain   Procedure Details  Location: shoulder - R glenohumeral  Preparation: Patient was prepped and draped in the usual sterile fashion  Needle size: 25 G  Ultrasound guidance: no  Approach: posterolateral    Aspirate amount: 0 mL    Patient tolerance: patient tolerated the procedure well with no immediate complications  Dressing:  Sterile dressing applied

## 2023-04-28 ENCOUNTER — TELEPHONE (OUTPATIENT)
Dept: FAMILY MEDICINE CLINIC | Facility: CLINIC | Age: 88
End: 2023-04-28

## 2023-04-28 NOTE — TELEPHONE ENCOUNTER
Pt's wife called to make an appointment for  due to constipation  Pt's wife stated that pt has not made a bowel movement in 1 week and it was black  After speaking with Thanh Stringer, pt was strongly advised to go to the ER  Pt declined and requested a next avail appt  Pt scheduled for Monday and strongly urged to go to the ER

## 2023-05-01 ENCOUNTER — OFFICE VISIT (OUTPATIENT)
Dept: FAMILY MEDICINE CLINIC | Facility: CLINIC | Age: 88
End: 2023-05-01

## 2023-05-01 VITALS
SYSTOLIC BLOOD PRESSURE: 132 MMHG | HEART RATE: 97 BPM | DIASTOLIC BLOOD PRESSURE: 78 MMHG | TEMPERATURE: 98.7 F | OXYGEN SATURATION: 96 %

## 2023-05-01 DIAGNOSIS — G89.29 CHRONIC LOW BACK PAIN WITHOUT SCIATICA, UNSPECIFIED BACK PAIN LATERALITY: Primary | ICD-10-CM

## 2023-05-01 DIAGNOSIS — K59.04 CHRONIC IDIOPATHIC CONSTIPATION: ICD-10-CM

## 2023-05-01 DIAGNOSIS — E11.8 TYPE 2 DIABETES MELLITUS WITH COMPLICATION, WITHOUT LONG-TERM CURRENT USE OF INSULIN (HCC): ICD-10-CM

## 2023-05-01 DIAGNOSIS — M54.50 CHRONIC LOW BACK PAIN WITHOUT SCIATICA, UNSPECIFIED BACK PAIN LATERALITY: Primary | ICD-10-CM

## 2023-05-01 DIAGNOSIS — Z13.29 SCREENING FOR THYROID DISORDER: ICD-10-CM

## 2023-05-01 RX ORDER — TIZANIDINE 2 MG/1
2 TABLET ORAL 2 TIMES DAILY PRN
Qty: 20 TABLET | Refills: 0 | Status: SHIPPED | OUTPATIENT
Start: 2023-05-01

## 2023-05-01 NOTE — PROGRESS NOTES
Change the [facility] right and now name: Aysha Harris      : 1935      MRN: [id number]  Encounter Provider: Lisa Hollis DO  Encounter Date: 2023   Encounter department: [address] [place] [address]    Assessment & Plan     1  Chronic low back pain without sciatica, unspecified back pain laterality  Assessment & Plan:  Patient has chronic low back pain with acute exacerbation from fall last week  Exam is unremarkable  No bony tenderness  Some muscular tenderness  He is having some benefit from Tylenol and topical pain relief gels  We will add low-dose muscle relaxant and moist heat  2  Type 2 diabetes mellitus with complication, without long-term current use of insulin (HCC)  -     Microalbumin, Random Urine (W/Creatinine)  -     Microalbumin / creatinine urine ratio; Future  -     tiZANidine (ZANAFLEX) 2 mg tablet; Take 1 tablet (2 mg total) by mouth 2 (two) times a day as needed for muscle spasms    3  Screening for thyroid disorder    4  Chronic idiopathic constipation  Assessment & Plan:  Constipation of greater than 1 weeks duration  Restart stool softener and use MiraLAX or Senokot as needed to induce regular bowel habit  Call if not improving             Subjective      Patient complains of both constipation and low back pain  Constipation for greater than 1 week  Has used a suppository but no other over-the-counter products  Complains of low back pain since a fall at home 8 days ago when he landed on the floor  Pain is primarily across the mid to low back  No radiation  Review of Systems   Constitutional: Negative  Respiratory: Negative  Cardiovascular: Negative  Gastrointestinal: Positive for constipation  Genitourinary: Negative  Musculoskeletal: Positive for back pain  Psychiatric/Behavioral: Negative          Current Outpatient Medications on File Prior to Visit   Medication Sig    acetaminophen (TYLENOL) 325 mg tablet every 6 (six) hours    aspirin (ECOTRIN LOW STRENGTH) 81 mg EC tablet Take 81 mg by mouth daily    atorvastatin (LIPITOR) 40 mg tablet Take 1 tablet (40 mg total) by mouth daily at bedtime    Brinzolamide-Brimonidine (Simbrinza) 1-0 2 % SUSP Apply to eye every 8 (eight) hours    Cholecalciferol (Vitamin D-3) 25 MCG (1000 UT) CAPS Take by mouth    Eliquis 5 MG Take 1 tablet (5 mg total) by mouth 2 (two) times a day    famotidine (PEPCID) 10 mg tablet Take 10 mg by mouth if needed for heartburn    glucose blood (Wilmer Contour Test) test strip Test Once Daily    Lancets MISC To test BS once daily      lisinopril (ZESTRIL) 20 mg tablet Take 1 tablet (20 mg total) by mouth daily    Lumigan 0 01 % ophthalmic drops Administer into the left eye      meclizine (ANTIVERT) 25 mg tablet Take 1 tablet (25 mg total) by mouth every 8 (eight) hours as needed for dizziness (Patient not taking: Reported on 4/27/2022)    metFORMIN (GLUCOPHAGE) 500 mg tablet Take 1 tablet (500 mg total) by mouth daily with breakfast    metoprolol succinate (TOPROL-XL) 100 mg 24 hr tablet Take 1 tablet (100 mg total) by mouth daily    mirtazapine (REMERON) 15 mg tablet Take 7 5 mg by mouth daily (Patient not taking: Reported on 4/27/2022)    nitroglycerin (NITROSTAT) 0 4 mg SL tablet Place 0 4 mg under the tongue as needed    predniSONE 10 mg tablet Four pills daily for 7 days then 3 pills daily for 7 days then 2 pills daily for 7 days then 1 pill daily until finished (Patient not taking: Reported on 12/5/2022)    vitamin B-12 (VITAMIN B-12) 1,000 mcg tablet Take 1,000 mcg by mouth daily       Objective     /78 (BP Location: Left arm, Patient Position: Sitting, Cuff Size: Adult)   Pulse 97   Temp 98 7 °F (37 1 °C)   SpO2 96%     Physical Exam  Anupam Boyer DO

## 2023-05-01 NOTE — ASSESSMENT & PLAN NOTE
Patient has chronic low back pain with acute exacerbation from fall last week  Exam is unremarkable  No bony tenderness  Some muscular tenderness  He is having some benefit from Tylenol and topical pain relief gels  We will add low-dose muscle relaxant and moist heat

## 2023-05-01 NOTE — ASSESSMENT & PLAN NOTE
Constipation of greater than 1 weeks duration  Restart stool softener and use MiraLAX or Senokot as needed to induce regular bowel habit    Call if not improving

## 2023-05-26 DIAGNOSIS — E78.2 MIXED HYPERLIPIDEMIA: ICD-10-CM

## 2023-05-26 RX ORDER — ATORVASTATIN CALCIUM 40 MG/1
TABLET, FILM COATED ORAL
Qty: 90 TABLET | Refills: 3 | Status: SHIPPED | OUTPATIENT
Start: 2023-05-26

## 2023-06-02 ENCOUNTER — RA CDI HCC (OUTPATIENT)
Dept: OTHER | Facility: HOSPITAL | Age: 88
End: 2023-06-02

## 2023-06-02 NOTE — PROGRESS NOTES
I13 0  E11 22  [facility] 75  coding opportunities          Chart Reviewed number of suggestions sent to Provider: 2     Patients Insurance     Medicare Insurance: [employer]

## 2023-06-06 LAB
CREAT ?TM UR-SCNC: 133.5 UMOL/L
EXT ALBUMIN URINE RANDOM: 2.1
HBA1C MFR BLD HPLC: 6.4 %
MICROALBUMIN/CREAT UR: 15.7 MG/G{CREAT}

## 2023-06-12 ENCOUNTER — OFFICE VISIT (OUTPATIENT)
Dept: FAMILY MEDICINE CLINIC | Facility: CLINIC | Age: 88
End: 2023-06-12
Payer: MEDICARE

## 2023-06-12 VITALS
TEMPERATURE: 98.5 F | SYSTOLIC BLOOD PRESSURE: 134 MMHG | DIASTOLIC BLOOD PRESSURE: 90 MMHG | HEART RATE: 96 BPM | OXYGEN SATURATION: 97 % | WEIGHT: 143.6 LBS | BODY MASS INDEX: 21.83 KG/M2

## 2023-06-12 DIAGNOSIS — E78.2 MIXED HYPERLIPIDEMIA: ICD-10-CM

## 2023-06-12 DIAGNOSIS — C18.6 CARCINOMA OF LEFT COLON (HCC): ICD-10-CM

## 2023-06-12 DIAGNOSIS — I71.40 ABDOMINAL AORTIC ANEURYSM (AAA) WITHOUT RUPTURE, UNSPECIFIED PART (HCC): ICD-10-CM

## 2023-06-12 DIAGNOSIS — I50.22 CHRONIC SYSTOLIC HEART FAILURE (HCC): ICD-10-CM

## 2023-06-12 DIAGNOSIS — M54.50 CHRONIC LOW BACK PAIN WITHOUT SCIATICA, UNSPECIFIED BACK PAIN LATERALITY: ICD-10-CM

## 2023-06-12 DIAGNOSIS — I25.10 CORONARY ARTERY DISEASE INVOLVING NATIVE CORONARY ARTERY OF NATIVE HEART WITHOUT ANGINA PECTORIS: ICD-10-CM

## 2023-06-12 DIAGNOSIS — I48.0 PAROXYSMAL ATRIAL FIBRILLATION (HCC): ICD-10-CM

## 2023-06-12 DIAGNOSIS — K59.04 CHRONIC IDIOPATHIC CONSTIPATION: ICD-10-CM

## 2023-06-12 DIAGNOSIS — G89.29 CHRONIC LOW BACK PAIN WITHOUT SCIATICA, UNSPECIFIED BACK PAIN LATERALITY: ICD-10-CM

## 2023-06-12 DIAGNOSIS — R22.1 LOCALIZED SWELLING, MASS AND LUMP, NECK: ICD-10-CM

## 2023-06-12 DIAGNOSIS — E11.8 TYPE 2 DIABETES MELLITUS WITH COMPLICATION, WITHOUT LONG-TERM CURRENT USE OF INSULIN (HCC): Primary | ICD-10-CM

## 2023-06-12 PROCEDURE — 99214 OFFICE O/P EST MOD 30 MIN: CPT | Performed by: FAMILY MEDICINE

## 2023-06-12 RX ORDER — ATORVASTATIN CALCIUM 40 MG/1
40 TABLET, FILM COATED ORAL
Qty: 90 TABLET | Refills: 3 | Status: SHIPPED | OUTPATIENT
Start: 2023-06-12

## 2023-06-12 NOTE — ASSESSMENT & PLAN NOTE
Wt Readings from Last 3 Encounters:   06/12/23 65 1 kg (143 lb 9 6 oz)   03/24/23 77 1 kg (170 lb)   02/15/23 66 2 kg (146 lb)       Euvolemic on exam   No symptoms of heart failure

## 2023-06-12 NOTE — ASSESSMENT & PLAN NOTE
Lab Results   Component Value Date    HGBA1C 6 4 (H) 06/06/2023   Well controlled with hemoglobin A1c of 6 4    Continue metformin 500 mg daily

## 2023-06-12 NOTE — PROGRESS NOTES
Name: Ida Becerra      : 1935      MRN: [id number]  Encounter Provider: Arthur Rivero DO  Encounter Date: 2023   Encounter department: [address] [place] [address]     1  Type 2 diabetes mellitus with complication, without long-term current use of insulin ([facility])  Assessment & Plan:    Lab Results   Component Value Date    HGBA1C 6 4 (H) 2023   Well controlled with hemoglobin A1c of 6 4  Continue metformin 500 mg daily    Orders:  -     Comprehensive metabolic panel; Future; Expected date: 2023  -     Hemoglobin A1C; Future; Expected date: 2023    2  Mixed hyperlipidemia  -     atorvastatin (LIPITOR) 40 mg tablet; Take 1 tablet (40 mg total) by mouth daily at bedtime  -     TSH, 3rd generation with Free T4 reflex; Future; Expected date: 2023    3  Abdominal aortic aneurysm (AAA) without rupture, unspecified part ([facility] )    4  Chronic idiopathic constipation    5  Chronic low back pain without sciatica, unspecified back pain laterality    6  Chronic systolic heart failure (HCC)  Assessment & Plan:  Wt Readings from Last 3 Encounters:   23 65 1 kg (143 lb 9 6 oz)   23 77 1 kg (170 lb)   02/15/23 66 2 kg (146 lb)       Euvolemic on exam   No symptoms of heart failure  7  Coronary artery disease involving native coronary artery of native heart without angina pectoris  Assessment & Plan:  Asymptomatic  Continue risk factor modification      8  Paroxysmal atrial fibrillation (HCC)  Assessment & Plan:  Stable  Continue follow-up with cardiology  Continue anticoagulation with Eliquis      9  Localized swelling, mass and lump, neck  Comments:  Somewhat suspicious lymphadenopathy left upper cervical region  Check soft tissue ultrasound  Orders:  -     US head neck soft tissue; Future; Expected date: 2023    10  Carcinoma of left colon [facility])  Assessment & Plan:  Continue routine follow-up    No evidence at this time of recurrence             Subjective      Patient presents for routine follow-up of chronic medical problems  He has no new complaints  He does have chronic shoulder pain for which she had an injection at the end of March  He also does complain of a lump on the left side of his neck  He has a skin lesion in that region and will be seeing plastic surgery in the near future  Review of Systems   Constitutional: Negative  Respiratory: Negative  Cardiovascular: Negative  Gastrointestinal: Negative  Genitourinary: Negative  Musculoskeletal: Positive for arthralgias  Psychiatric/Behavioral: Negative  Current Outpatient Medications on File Prior to Visit   Medication Sig   • acetaminophen (TYLENOL) 325 mg tablet every 6 (six) hours   • aspirin (ECOTRIN LOW STRENGTH) 81 mg EC tablet Take 81 mg by mouth daily   • Brinzolamide-Brimonidine (Simbrinza) 1-0 2 % SUSP Apply to eye every 8 (eight) hours   • Cholecalciferol (Vitamin D-3) 25 MCG (1000 UT) CAPS Take by mouth   • Eliquis 5 MG Take 1 tablet (5 mg total) by mouth 2 (two) times a day   • famotidine (PEPCID) 10 mg tablet Take 10 mg by mouth if needed for heartburn   • glucose blood (Wilmer Contour Test) test strip Test Once Daily   • Lancets MISC To test BS once daily     • lisinopril (ZESTRIL) 20 mg tablet Take 1 tablet (20 mg total) by mouth daily   • Lumigan 0 01 % ophthalmic drops Administer into the left eye     • metFORMIN (GLUCOPHAGE) 500 mg tablet Take 1 tablet (500 mg total) by mouth daily with breakfast   • metoprolol succinate (TOPROL-XL) 100 mg 24 hr tablet Take 1 tablet (100 mg total) by mouth daily   • tiZANidine (ZANAFLEX) 2 mg tablet Take 1 tablet (2 mg total) by mouth 2 (two) times a day as needed for muscle spasms   • vitamin B-12 (VITAMIN B-12) 1,000 mcg tablet Take 1,000 mcg by mouth daily   • [DISCONTINUED] atorvastatin (LIPITOR) 40 mg tablet TAKE 1 TABLET BY MOUTH  DAILY AT BEDTIME   • meclizine (ANTIVERT) 25 mg tablet Take 1 tablet (25 mg total) by mouth every 8 (eight) hours as needed for dizziness (Patient not taking: Reported on 4/27/2022)   • mirtazapine (REMERON) 15 mg tablet Take 7 5 mg by mouth daily (Patient not taking: Reported on 4/27/2022)   • nitroglycerin (NITROSTAT) 0 4 mg SL tablet Place 0 4 mg under the tongue as needed   • predniSONE 10 mg tablet Four pills daily for 7 days then 3 pills daily for 7 days then 2 pills daily for 7 days then 1 pill daily until finished (Patient not taking: Reported on 12/5/2022)       Objective     /90 (BP Location: Right arm, Patient Position: Sitting, Cuff Size: Adult)   Pulse 96   Temp 98 5 °F (36 9 °C)   Wt 65 1 kg (143 lb 9 6 oz)   SpO2 97%   BMI 21 83 kg/m²     Physical Exam  Vitals and nursing note reviewed  Constitutional:       General: He is not in acute distress  Appearance: He is well-developed  He is not diaphoretic  HENT:      Head: Normocephalic and atraumatic  Eyes:      General:         Right eye: No discharge  Conjunctiva/sclera: Conjunctivae normal       Pupils: Pupils are equal, round, and reactive to light  Neck:      Thyroid: No thyromegaly  Cardiovascular:      Rate and Rhythm: Normal rate and regular rhythm  Pulmonary:      Effort: Pulmonary effort is normal  No respiratory distress  Breath sounds: Normal breath sounds  Musculoskeletal:      Cervical back: Normal range of motion  Lymphadenopathy:      Cervical: No cervical adenopathy (Firm lump left superior anterior cervical region)  Skin:     General: Skin is warm and dry  Neurological:      Mental Status: He is alert and oriented to person, place, and time  Psychiatric:         Behavior: Behavior normal          Thought Content:  Thought content normal          Judgment: Judgment normal        Britney San DO

## 2023-06-23 DIAGNOSIS — I48.0 PAROXYSMAL ATRIAL FIBRILLATION (HCC): ICD-10-CM

## 2023-06-23 RX ORDER — APIXABAN 5 MG/1
TABLET, FILM COATED ORAL
Qty: 180 TABLET | Refills: 3 | Status: SHIPPED | OUTPATIENT
Start: 2023-06-23

## 2023-06-28 DIAGNOSIS — R22.1 LOCALIZED SWELLING, MASS AND LUMP, NECK: Primary | ICD-10-CM

## 2023-07-13 ENCOUNTER — HOSPITAL ENCOUNTER (OUTPATIENT)
Dept: CT IMAGING | Facility: HOSPITAL | Age: 88
Discharge: HOME | End: 2023-07-13
Payer: MEDICARE

## 2023-07-13 DIAGNOSIS — R22.1 LOCALIZED SWELLING, MASS AND LUMP, NECK: ICD-10-CM

## 2023-07-13 PROCEDURE — G1004 CDSM NDSC: HCPCS

## 2023-07-13 PROCEDURE — 70491 CT SOFT TISSUE NECK W/DYE: CPT

## 2023-07-13 RX ADMIN — IOHEXOL 85 ML: 350 INJECTION, SOLUTION INTRAVENOUS at 12:33

## 2023-07-17 DIAGNOSIS — K11.9 LESION OF PAROTID GLAND: Primary | ICD-10-CM

## 2023-07-27 PROCEDURE — 88173 CYTOPATH EVAL FNA REPORT: CPT | Performed by: PATHOLOGY

## 2023-07-27 PROCEDURE — 88342 IMHCHEM/IMCYTCHM 1ST ANTB: CPT | Performed by: PATHOLOGY

## 2023-07-27 PROCEDURE — 88305 TISSUE EXAM BY PATHOLOGIST: CPT | Performed by: PATHOLOGY

## 2023-07-27 PROCEDURE — 88341 IMHCHEM/IMCYTCHM EA ADD ANTB: CPT | Performed by: PATHOLOGY

## 2023-07-31 PROCEDURE — 88173 CYTOPATH EVAL FNA REPORT: CPT | Performed by: PATHOLOGY

## 2023-08-03 PROCEDURE — 88342 IMHCHEM/IMCYTCHM 1ST ANTB: CPT | Performed by: PATHOLOGY

## 2023-08-03 PROCEDURE — 88305 TISSUE EXAM BY PATHOLOGIST: CPT | Performed by: PATHOLOGY

## 2023-08-03 PROCEDURE — 88341 IMHCHEM/IMCYTCHM EA ADD ANTB: CPT | Performed by: PATHOLOGY

## 2023-08-22 ENCOUNTER — HOSPITAL ENCOUNTER (OUTPATIENT)
Dept: NUCLEAR MEDICINE | Facility: HOSPITAL | Age: 88
Discharge: HOME | End: 2023-08-22
Attending: OTOLARYNGOLOGY
Payer: MEDICARE

## 2023-08-22 DIAGNOSIS — C07 CANCER OF PAROTID GLAND (HCC): ICD-10-CM

## 2023-08-22 LAB — GLUCOSE SERPL-MCNC: 98 MG/DL (ref 65–140)

## 2023-08-22 PROCEDURE — 82948 REAGENT STRIP/BLOOD GLUCOSE: CPT

## 2023-08-22 PROCEDURE — 78815 PET IMAGE W/CT SKULL-THIGH: CPT

## 2023-08-22 PROCEDURE — G1004 CDSM NDSC: HCPCS

## 2023-08-22 PROCEDURE — A9552 F18 FDG: HCPCS

## 2023-09-07 ENCOUNTER — TELEPHONE (OUTPATIENT)
Dept: FAMILY MEDICINE CLINIC | Facility: CLINIC | Age: 88
End: 2023-09-07

## 2023-09-07 NOTE — TELEPHONE ENCOUNTER
Voice message: Hi, I'm calling from [address] [facility]. My name is Aretha Lopez. I'm calling about Jalil Brewer. Date of birth is [birth year]. Can somebody please give me a call back? We are going to be the home care agency going in to provide care for him. He came out of the hospital last night and I just want to verify that Doctor Brandi Ojeda will sign our orders. My number is [phone]. Thank you. [facility] and confirmed Dr. Tony Santana as pt's PCP.

## 2023-09-11 ENCOUNTER — TELEPHONE (OUTPATIENT)
Dept: FAMILY MEDICINE CLINIC | Facility: CLINIC | Age: 88
End: 2023-09-11

## 2023-09-11 NOTE — TELEPHONE ENCOUNTER
Spoke w/pt's wife. Pt has an appt tomorrow with the surgeons office. Pt's wife will find out if pt needs labs and EKG for clearance and will call back to let us know.

## 2023-09-11 NOTE — TELEPHONE ENCOUNTER
I want to make an appointment for Giovani Estuardo DOYLE 1935. He needs a an appointment for pre admission for some minor surgery. The phone number here is [phone]. Pt scheduled to have surgery on 10/6/23. Pt scheduled for clearance 10/2/23. Pt's wife wanted to know if pt needs lab order placed for clearance.

## 2023-09-12 NOTE — H&P (VIEW-ONLY)
Assessment/Plan:  Patient is stable to proceed with surgery as planned pending medical clearance. Patient will followup in our office postoperatively. All risks, benefits, alternatives, and complications of the surgery have been reviewed in detail by Dr. Guzman. The risks of the surgery include but are not limited to:  bleeding, infection, need for further surgery, scar formation, sialocele formation (may require repeat aspirations to drain), need to drain fluid collection (saliva or blood) under skin after surgery, permanent numbness of the earlobe and incision site, facial sweating (small patch of sweat on cheek may form while eating), facial nerve injury or facial paralysis (inability to move entire side of face, inability to close eye and need to tape eye shut at night, inability to whistle or drink through a straw). The patient / parent understands and accepts all risks of the surgery. Pre-operative labs have been ordered. Medical clearance is needed - he will need to stop the blood thinners. The patient / parent has been instructed to fill the medications in preparation for the surgery. Post operative instructions have been reviewed and a copy has been given to the patient / parent. A post operative visit has been scheduled. If any questions should arise prior to or after the surgery, the patient has been instructed to call my office to discuss the concerns. Because of the surrounding basal cell carcinoma as well as the fact that this is invading directly under the skin I feel that a significant amount of skin will be removed and a skin flap or primary closure will be completed at the time of the surgery. Advancement flap repair was discussed by Dr. Guzman. He understands there will be additional scars from this but I will be able to close the wound primarily. 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  Skin cancer  C44.90                  Patient ID: Jory Lopez is a 81 y.o. male. Patient presents for pre-op H&P. He is scheduled for left total parotidectomy with NIMS. He also has a basal cell carcinoma in the surrounding area. He will also require a significant amount of skin removed with skin flap repair. He has no new complaints today. He did have a fall recently. Medical clearance is pending. Pain medication was provided at his last visit. Previous HPI was reviewed and is as follows:    Carol Lares is here for repeat evaluation of a parotid gland neoplasm. This was noticed by the patient 3 months ago and it has been enlarging. He initially was sent for an ultrasound and then a CT. The CT demonstrated a 1.5 x 1.3 x 1.7 cm complex mass which was inseparable to the posterior inferior left parotid gland. He was sent here for further evaluation. 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 He was also found to have a BCCA of the skin just anterior and superior to the site. He was then sent for a PET scan which demonstrated focal uptake in the parotid gland, skin above the gland and immediately deep and posterior without any corresponding finding on CT imaging. The following portions of the patient's history were reviewed and updated as appropriate: allergies, current medications, past family history, past medical history, past social history, past surgical history and problem list.      Past Medical History:   Diagnosis Date   • Acute ischemic left MCA stroke ([address]) 6/21/2019    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 MRI small bleed CT 8-19 no bleed No neurology follow up LINQ 6-19 PAF seen 11-19   Last Assessment & Plan:  Good recovery CVA- recent LINQ trasmission showed PAF: Would like to start Eliquis but because had CNS bleed needs neurology evaluation make sure ok. No change meds.    • NICOLE (acute kidney injury) ([address]) 4/9/2020   • Colon cancer [facility])     age 64's    colon resection chemo pills   • Fall     fell backwards from riding -  in ER-ok- incidental finding CXR   R lobe 2 masses-bx today    12/21/2021   • Full dentures    • Hyperlipidemia    • Hypertension    • Lung mass     R side x2,bx today 12/21/2021   • Stroke ([address])     6/2019     speech loss     minimal residual   • Vitreous hemorrhage of right eye ([address]) 07/19/2016    R artificial eye   • Walker as ambulation aid    • Wears glasses        Past Surgical History:   Procedure Laterality Date   • CATARACT EXTRACTION     • COLECTOMY     • COLONOSCOPY     • EYE SURGERY      R eye removed for hemorrhage   • SEPTOPLASTY         Social History     Tobacco Use   • Smoking status: Former     Packs/day: 1.00     Years: 40.00     Total pack years: 40.00     Types: Cigarettes   • Smokeless tobacco: Never   • Tobacco comments:     quit 30 years ago (as of 12/2018)   Vaping Use   • Vaping Use: Never used   Substance Use Topics   • Alcohol use: Yes     Alcohol/week: 1.0 standard drink of alcohol     Types: 1 Cans of beer per week     Comment: 4 x weekly   • Drug use: No       Current Outpatient Medications on File Prior to Visit   Medication Sig Dispense Refill   • acetaminophen (TYLENOL) 325 mg tablet every 6 (six) hours     • aspirin (ECOTRIN LOW STRENGTH) 81 mg EC tablet Take 81 mg by mouth daily     • atorvastatin (LIPITOR) 40 mg tablet Take 1 tablet (40 mg total) by mouth daily at bedtime 90 tablet 3   • Brinzolamide-Brimonidine (Simbrinza) 1-0.2 % SUSP Apply to eye every 8 (eight) hours     • Cholecalciferol (Vitamin D-3) 25 MCG (1000 UT) CAPS Take by mouth     • Eliquis 5 MG TAKE 1 TABLET BY MOUTH  TWICE DAILY 180 tablet 3   • famotidine (PEPCID) 10 mg tablet Take 10 mg by mouth if needed for heartburn     • glucose blood (Wilmer Contour Test) test strip Test Once Daily 100 each 1   • Lancets MISC To test BS once daily.  100 each 3   • lisinopril (ZESTRIL) 20 mg tablet Take 1 tablet (20 mg total) by mouth daily 90 tablet 3   • Lumigan 0.01 % ophthalmic drops Administer into the left eye       • metFORMIN (GLUCOPHAGE) 500 mg tablet Take 1 tablet (500 mg total) by mouth daily with breakfast 90 tablet 3   • metoprolol succinate (TOPROL-XL) 100 mg 24 hr tablet Take 1 tablet (100 mg total) by mouth daily 90 tablet 3   • oxyCODONE-acetaminophen (PERCOCET) 5-325 mg per tablet Take 1 tablet by mouth every 6 (six) hours as needed for moderate pain Max Daily Amount: 4 tablets 20 tablet 0   • tiZANidine (ZANAFLEX) 2 mg tablet Take 1 tablet (2 mg total) by mouth 2 (two) times a day as needed for muscle spasms 20 tablet 0   • vitamin B-12 (VITAMIN B-12) 1,000 mcg tablet Take 1,000 mcg by mouth daily     • meclizine (ANTIVERT) 25 mg tablet Take 1 tablet (25 mg total) by mouth every 8 (eight) hours as needed for dizziness (Patient not taking: Reported on 4/27/2022) 30 tablet 0   • mirtazapine (REMERON) 15 mg tablet Take 7.5 mg by mouth daily (Patient not taking: Reported on 4/27/2022)     • nitroglycerin (NITROSTAT) 0.4 mg SL tablet Place 0.4 mg under the tongue as needed     • predniSONE 10 mg tablet Four pills daily for 7 days then 3 pills daily for 7 days then 2 pills daily for 7 days then 1 pill daily until finished (Patient not taking: Reported on 12/5/2022) 70 tablet 0     No current facility-administered medications on file prior to visit. No Known Allergies        Review of Systems   Constitutional: Negative for activity change, appetite change, fatigue and unexpected weight change. HENT: Negative for congestion, ear discharge, ear pain, facial swelling, hearing loss, nosebleeds, postnasal drip, rhinorrhea, sinus pressure, sinus pain, sneezing, sore throat, tinnitus, trouble swallowing and voice change. Eyes: Negative for photophobia, pain, discharge, itching and visual disturbance. Respiratory: Negative for cough, chest tightness and shortness of breath. Musculoskeletal: Negative for gait problem, neck pain and neck stiffness. Skin: Negative for rash and wound. Crusted area above parotid. Allergic/Immunologic: Negative for environmental allergies. Neurological: Negative for dizziness, facial asymmetry and speech difficulty. Hematological: Negative for adenopathy. Psychiatric/Behavioral: Negative for sleep disturbance. The patient is not nervous/anxious. /80   Pulse 76   Ht 5' 4" (1.626 m)   Wt 68 kg (150 lb)   BMI 25.75 kg/m²       PHYSICAL  EXAMINATION    CONSTITUTION:    Appears appropriate for age. No evidence of any acute distress. Communicates normally. Voice quality is clear. Alert and oriented. HEAD/FACE:    Atraumatic, normocephalic on inspection. No scars present. Salivary glands:  Submandibular glands are normal in texture and size without any asymmetry. Right parotid gland normal.  The left side with an inferior/posterior mass. Facial nerve function is symmetric and normal.    EYES:    Right eye prosthesis - left eye normal  Left periorbital ecchymosis with large eschar left brow    EARS:    External ears normal.    External canals are clear and dry. Tympanic membranes intact with normal mobility, no effusion, no retraction, no perforation. Post auricular area is normal    NOSE:    External nose without deformity. Internal mucosa pink and moist.    Septum deviated to the left side anteriorly - there is a large dry perforation. Inferior nasal turbinates normal in color and size bilaterally    ORAL CAVITY:    Lips normal and healthy in appearance. Edentulous with full upper and lower dentures. Gums healthy, pink and moist.    Tongue appears pink and moist with no lesions. Floor of mouth pink, moist, and smooth. Submandibular ducts patent with clear saliva. Parotid ducts patent with clear saliva. Oral mucosa pink and moist.    Hard palate normal in appearance without any lesions.     OROPHARYNX:    Soft palate pink and moist without any lesions. Uvula midline without any lesions. Tonsils grade 1 bilaterally. Posterior pharynx pink and moist without any lesions    NECK:    Supple and symmetric. No masses noted. Trachea midline. No thyromegaly or nodules noted. LYMPH:    No palpable adenopathy in left or right neck    SKIN:    No rashes. .  The left side of the neck above the parotid gland with a crusted lesion pictured below. This measures 1 in diameter. Just inferior and posterior to this is a firm mass within the parotid - seems to grow into the skin and measures 2.5 x 2 cm.     HEART:   Regular rate and rhythm    LUNGS:  Clear to auscultation bilaterally

## 2023-09-13 ENCOUNTER — OFFICE VISIT (OUTPATIENT)
Dept: FAMILY MEDICINE CLINIC | Facility: CLINIC | Age: 88
End: 2023-09-13
Payer: MEDICARE

## 2023-09-13 VITALS
DIASTOLIC BLOOD PRESSURE: 80 MMHG | SYSTOLIC BLOOD PRESSURE: 140 MMHG | HEART RATE: 73 BPM | OXYGEN SATURATION: 94 % | TEMPERATURE: 98.9 F

## 2023-09-13 DIAGNOSIS — S01.102A: Primary | ICD-10-CM

## 2023-09-13 PROCEDURE — 99213 OFFICE O/P EST LOW 20 MIN: CPT | Performed by: FAMILY MEDICINE

## 2023-09-13 NOTE — PROGRESS NOTES
Name: Sharyn Vang      : 1935      MRN: [id number]  Encounter Provider: Gavino Hauser DO  Encounter Date: 2023   Encounter department: [address] [place] [address]     1. Open wound of left eyebrow       Wound closed with 3 sutures which required removal today. Also required extensive debridement of large scab/dried blood before sutures could be removed effectively. Slight oozing noted after scab debrided and sutures removed. Stopped with gentle pressure and application of Band-Aid. Subjective      Patient presented for removal of sutures in his left eyebrow region. This is a result of a fall for which she was hospitalized. He was discharged from the hospital 1 week ago with instructions to have sutures removed in 2 to 4 days. Review of Systems   Skin: Positive for wound (Left eyebrow region). Current Outpatient Medications on File Prior to Visit   Medication Sig   • acetaminophen (TYLENOL) 325 mg tablet every 6 (six) hours   • aspirin (ECOTRIN LOW STRENGTH) 81 mg EC tablet Take 81 mg by mouth daily (Patient not taking: Reported on 2023)   • atorvastatin (LIPITOR) 40 mg tablet Take 1 tablet (40 mg total) by mouth daily at bedtime   • Brinzolamide-Brimonidine (Simbrinza) 1-0.2 % SUSP Apply to eye every 8 (eight) hours   • Cholecalciferol (Vitamin D-3) 25 MCG (1000 UT) CAPS Take by mouth   • Eliquis 5 MG TAKE 1 TABLET BY MOUTH  TWICE DAILY   • famotidine (PEPCID) 10 mg tablet Take 10 mg by mouth if needed for heartburn   • glucose blood (Wilmer Contour Test) test strip Test Once Daily   • Lancets MISC To test BS once daily.    • lisinopril (ZESTRIL) 20 mg tablet Take 1 tablet (20 mg total) by mouth daily   • Lumigan 0.01 % ophthalmic drops Administer into the left eye     • meclizine (ANTIVERT) 25 mg tablet Take 1 tablet (25 mg total) by mouth every 8 (eight) hours as needed for dizziness (Patient not taking: Reported on 2022)   • metFORMIN (GLUCOPHAGE) 500 mg tablet Take 1 tablet (500 mg total) by mouth daily with breakfast   • metoprolol succinate (TOPROL-XL) 100 mg 24 hr tablet Take 1 tablet (100 mg total) by mouth daily   • mirtazapine (REMERON) 15 mg tablet Take 7.5 mg by mouth daily (Patient not taking: Reported on 4/27/2022)   • nitroglycerin (NITROSTAT) 0.4 mg SL tablet Place 0.4 mg under the tongue as needed   • oxyCODONE-acetaminophen (PERCOCET) 5-325 mg per tablet Take 1 tablet by mouth every 6 (six) hours as needed for moderate pain Max Daily Amount: 4 tablets   • predniSONE 10 mg tablet Four pills daily for 7 days then 3 pills daily for 7 days then 2 pills daily for 7 days then 1 pill daily until finished (Patient not taking: Reported on 12/5/2022)   • tiZANidine (ZANAFLEX) 2 mg tablet Take 1 tablet (2 mg total) by mouth 2 (two) times a day as needed for muscle spasms   • vitamin B-12 (VITAMIN B-12) 1,000 mcg tablet Take 1,000 mcg by mouth daily       Objective     /80 (BP Location: Left arm, Patient Position: Sitting, Cuff Size: Adult)   Pulse 73   Temp 98.9 °F (37.2 °C)   SpO2 94%     Physical Exam  Skin:     Comments: Large scab with dried blood over lateral aspect of left eyebrow.        Allen Bowens DO

## 2023-09-26 ENCOUNTER — RA CDI HCC (OUTPATIENT)
Dept: OTHER | Facility: HOSPITAL | Age: 88
End: 2023-09-26

## 2023-09-26 ENCOUNTER — APPOINTMENT (OUTPATIENT)
Dept: LAB | Facility: CLINIC | Age: 88
End: 2023-09-26
Payer: MEDICARE

## 2023-09-26 DIAGNOSIS — C44.90 SKIN CANCER: ICD-10-CM

## 2023-09-26 DIAGNOSIS — D68.9 COAGULATION DEFECT, UNSPECIFIED (HCC): ICD-10-CM

## 2023-09-26 DIAGNOSIS — Z01.812 PRE-PROCEDURE LAB EXAM: ICD-10-CM

## 2023-09-26 DIAGNOSIS — C07 CANCER OF PAROTID GLAND (HCC): ICD-10-CM

## 2023-09-26 DIAGNOSIS — Z01.818 PREOPERATIVE TESTING: ICD-10-CM

## 2023-09-26 LAB
APTT PPP: 35 SECONDS (ref 23–37)
INR PPP: 1.38 (ref 0.84–1.19)
PROTHROMBIN TIME: 17.2 SECONDS (ref 11.6–14.5)

## 2023-09-26 PROCEDURE — 85730 THROMBOPLASTIN TIME PARTIAL: CPT

## 2023-09-26 PROCEDURE — 85610 PROTHROMBIN TIME: CPT

## 2023-09-26 PROCEDURE — 36415 COLL VENOUS BLD VENIPUNCTURE: CPT

## 2023-09-26 NOTE — PROGRESS NOTES
I13.0  E11.22  [facility] coding opportunities          Chart Reviewed number of suggestions sent to Provider: 2     Patients Insurance     Medicare Insurance: [employer]

## 2023-09-28 RX ORDER — GUAIFENESIN 600 MG/1
1200 TABLET, EXTENDED RELEASE ORAL EVERY 12 HOURS SCHEDULED
COMMUNITY

## 2023-09-28 NOTE — PRE-PROCEDURE INSTRUCTIONS
Pre-Surgery Instructions:   Medication Instructions   • acetaminophen (TYLENOL) 325 mg tablet Take Day of Surgery; Continue to take as prescribed including DOS with a small sip of water, unless usually taken at night   • atorvastatin (LIPITOR) 40 mg tablet Take Day of Surgery; Continue to take as prescribed including DOS with a small sip of water, unless usually taken at night   • Brinzolamide-Brimonidine (Simbrinza) 1-0.2 % SUSP Continue as prescribed   • Cholecalciferol (Vitamin D-3) 25 MCG (1000 UT) CAPS Avoid 1 week prior to surgery    • Eliquis 5 MG Hold 48 hours per CC   • famotidine (PEPCID) 10 mg tablet Take Day of Surgery; Continue to take as prescribed including DOS with a small sip of water, unless usually taken at night   • guaiFENesin (MUCINEX) 600 mg 12 hr tablet Do not take day of surgery; continue as prescribed excluding DOS   • lisinopril (ZESTRIL) 20 mg tablet Do not take day of surgery; continue as prescribed excluding DOS   • Lumigan 0.01 % ophthalmic drops Continue as prescribed   • metFORMIN (GLUCOPHAGE) 500 mg tablet Do not take day of surgery; continue as prescribed excluding DOS   • metoprolol succinate (TOPROL-XL) 100 mg 24 hr tablet Take Day of Surgery; Continue to take as prescribed including DOS with a small sip of water, unless usually taken at night   • oxyCODONE-acetaminophen (PERCOCET) 5-325 mg per tablet Take Day of Surgery; Continue to take as prescribed including DOS with a small sip of water, unless usually taken at night   • tiZANidine (ZANAFLEX) 2 mg tablet Take Day of Surgery; Continue to take as prescribed including DOS with a small sip of water, unless usually taken at night   • vitamin B-12 (VITAMIN B-12) 1,000 mcg tablet Do not take day of surgery; continue as prescribed excluding DOS    Medication instructions for day surgery reviewed. Please use only a sip of water to take your instructed medications.  Avoid all over the counter vitamins, supplements and NSAIDS for one week prior to surgery per anesthesia guidelines. Tylenol is ok to take as needed. You will receive a call one business day prior to surgery with an arrival time and hospital directions. If your surgery is scheduled on a Monday, the hospital will be calling you on the Friday prior to your surgery. If you have not heard from anyone by 8pm, please call the hospital supervisor through the hospital  at [phone]. Freddie Sterling [phone]). Do not eat or drink anything after midnight the night before your surgery, including candy, mints, lifesavers, or chewing gum. Do not drink alcohol 24hrs before your surgery. Try not to smoke at least 24hrs before your surgery. Follow the pre surgery showering instructions as listed in the [facility] Surgical Experience Booklet” or otherwise provided by your surgeon's office. Do not shave the surgical area 24 hours before surgery. Do not apply any lotions, creams, including makeup, cologne, deodorant, or perfumes after showering on the day of your surgery. No contact lenses, eye make-up, or artificial eyelashes. Remove nail polish, including gel polish, and any artificial, gel, or acrylic nails if possible. Remove all jewelry including rings and body piercing jewelry. Wear causal clothing that is easy to take on and off. Consider your type of surgery. Keep any valuables, jewelry, piercings at home. Please bring any specially ordered equipment (sling, braces) if indicated. Arrange for a responsible person to drive you to and from the hospital on the day of your surgery. Visitor Guidelines discussed. Call the surgeon's office with any new illnesses, exposures, or additional questions prior to surgery. Please reference your [facility] Surgical Experience Booklet” for additional information to prepare for your upcoming surgery.

## 2023-10-02 ENCOUNTER — CONSULT (OUTPATIENT)
Dept: FAMILY MEDICINE CLINIC | Facility: CLINIC | Age: 88
End: 2023-10-02
Payer: MEDICARE

## 2023-10-02 VITALS
HEART RATE: 82 BPM | TEMPERATURE: 98.7 F | WEIGHT: 146.6 LBS | SYSTOLIC BLOOD PRESSURE: 120 MMHG | BODY MASS INDEX: 25.16 KG/M2 | OXYGEN SATURATION: 95 % | DIASTOLIC BLOOD PRESSURE: 72 MMHG

## 2023-10-02 DIAGNOSIS — I10 BENIGN ESSENTIAL HYPERTENSION: ICD-10-CM

## 2023-10-02 DIAGNOSIS — D68.9 COAGULATION DEFECT, UNSPECIFIED (HCC): ICD-10-CM

## 2023-10-02 DIAGNOSIS — E11.9 TYPE 2 DIABETES MELLITUS WITHOUT COMPLICATION, WITHOUT LONG-TERM CURRENT USE OF INSULIN (HCC): ICD-10-CM

## 2023-10-02 DIAGNOSIS — Z23 NEED FOR INFLUENZA VACCINATION: ICD-10-CM

## 2023-10-02 DIAGNOSIS — Z01.818 PRE-OP EXAMINATION: Primary | ICD-10-CM

## 2023-10-02 DIAGNOSIS — C07 CANCER OF PAROTID GLAND (HCC): ICD-10-CM

## 2023-10-02 DIAGNOSIS — Z23 NEED FOR VACCINATION: ICD-10-CM

## 2023-10-02 PROCEDURE — 99213 OFFICE O/P EST LOW 20 MIN: CPT | Performed by: FAMILY MEDICINE

## 2023-10-02 PROCEDURE — 90662 IIV NO PRSV INCREASED AG IM: CPT | Performed by: FAMILY MEDICINE

## 2023-10-02 PROCEDURE — G0008 ADMIN INFLUENZA VIRUS VAC: HCPCS | Performed by: FAMILY MEDICINE

## 2023-10-02 RX ORDER — LISINOPRIL 20 MG/1
20 TABLET ORAL DAILY
Qty: 90 TABLET | Refills: 3 | Status: SHIPPED | OUTPATIENT
Start: 2023-10-02

## 2023-10-02 RX ORDER — METOPROLOL SUCCINATE 100 MG/1
100 TABLET, EXTENDED RELEASE ORAL DAILY
Qty: 90 TABLET | Refills: 3 | Status: SHIPPED | OUTPATIENT
Start: 2023-10-02

## 2023-10-02 NOTE — ASSESSMENT & PLAN NOTE
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 Chronic medical problems were discussed and reviewed. Medication list was reviewed and updated. He will hold his Eliquis for 2 days prior to his surgery. He will hold all medications the morning of his surgery with the exception of metoprolol. ECG done recently by his cardiologist.  Patient is moderate risk based on his past medical history. He is clinically stable and he is medically cleared to proceed with surgery.

## 2023-10-02 NOTE — PROGRESS NOTES
Name: Fabian Daly      : 1935      MRN: [id number]  Encounter Provider: Josh Gonzales DO  Encounter Date: 10/2/2023   Encounter department: [address] [place] [address]     1. 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 Chronic medical problems were discussed and reviewed. Medication list was reviewed and updated. He will hold his Eliquis for 2 days prior to his surgery. He will hold all medications the morning of his surgery with the exception of metoprolol. ECG done recently by his cardiologist.  Patient is moderate risk based on his past medical history. He is clinically stable and he is medically cleared to proceed with surgery. 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 Benign essential hypertension  -     lisinopril (ZESTRIL) 20 mg tablet; Take 1 tablet (20 mg total) by mouth daily  -     metoprolol succinate (TOPROL-XL) 100 mg 24 hr tablet; Take 1 tablet (100 mg total) by mouth daily    4. Type 2 diabetes mellitus without complication, without long-term current use of insulin (HCC)  -     metFORMIN (GLUCOPHAGE) 500 mg tablet; Take 1 tablet (500 mg total) by mouth daily with breakfast    5. Need for influenza vaccination  -     influenza vaccine, high-dose, PF 0.7 mL (FLUZONE HIGH-DOSE)    6. Need for vaccination    7.  Coagulation defect, unspecified ([address])           Subjective     Fabian Daly  81 y.o.  male    SURGEON:Boy    SURGERY/PROCEDURE: Parotidectomy with NIMS with skin removal and skin flap for primary closure    DATE OF SURGERY: 10/6/2023    PRIOR ANESTHESIA:yes    COMPLICATION: no    BLEEDING PROBLEM: no    PERTINENT PMH: Type 2 diabetes, chronic kidney disease, paroxysmal atrial fibrillation, history of colon cancer, carotid artery disease, chronic systolic heart failure, hypertension    EXERCISE CAPACITY:   CAN WALK 4 BLOCKS AND OR CLIMB 2 FLIGHTS: No    HOME LIVING SITUATION SAFE AND SECURE: Yes      TOBACCO: no     ETOH: no     ILLEGAL DRUGS: no    Patient presents for preop clearance for upcoming surgery for removal of his parotid gland on the left side due to carcinoma with extensive skin revision and possible flap closure. Chronic medical problems listed above. Medications include atorvastatin, Eliquis, Pepcid, lisinopril, metformin, metoprolol, tizanidine    Review of Systems   Constitutional: Negative. Respiratory: Negative. Cardiovascular: Negative. Gastrointestinal: Negative. Genitourinary: Negative. Musculoskeletal: Negative. Psychiatric/Behavioral: Negative. Past Medical History:   Diagnosis Date   • Acute ischemic left MCA stroke ([address]) 06/21/2019    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 MRI small bleed CT 8-19 no bleed No neurology follow up LINQ 6-19 PAF seen 11-19   Last Assessment & Plan:  Good recovery CVA- recent LINQ trasmission showed PAF: Would like to start Eliquis but because had CNS bleed needs neurology evaluation make sure ok. No change meds.    • NICOLE (acute kidney injury) ([address]) 04/09/2020   • Colon cancer [facility])     age 64's    colon resection     chemo pills   • Diabetes mellitus ([address])    • Fall     fell backwards from riding -  in ER-ok- incidental finding CXR   R lobe 2 masses-bx today    12/21/2021   • Full dentures    • Hyperlipidemia    • Hypertension    • Lung mass     R side x2,bx today 12/21/2021   • Seasonal allergies    • Stroke ([address])     6/2019     speech loss     minimal residual   • Vitreous hemorrhage of right eye ([address]) 07/19/2016    R artificial eye   • Walker as ambulation aid    • Wears glasses      Past Surgical History:   Procedure Laterality Date   • CATARACT EXTRACTION     • COLECTOMY     • COLONOSCOPY     • EYE SURGERY      R eye removed for hemorrhage   • SEPTOPLASTY       Family History   Family history unknown: Yes     Social History Socioeconomic History   • Marital status: /Civil Union     Spouse name: None   • Number of children: None   • Years of education: None   • Highest education level: None   Occupational History   • None   Tobacco Use   • Smoking status: Former     Packs/day: 1.00     Years: 40.00     Total pack years: 40.00     Types: Cigarettes   • Smokeless tobacco: Never   • Tobacco comments:     quit 30 years ago (as of 12/2018)   Vaping Use   • Vaping Use: Never used   Substance and Sexual Activity   • Alcohol use: Yes     Alcohol/week: 1.0 standard drink of alcohol     Types: 1 Cans of beer per week     Comment: 4 x weekly   • Drug use: No   • Sexual activity: Not Currently   Other Topics Concern   • None   Social History Narrative   • None     Social Determinants of Health     Financial Resource Strain: Low Risk  (10/31/2022)    Overall Financial Resource Strain (CARDIA)    • Difficulty of Paying Living Expenses: Not very hard   Food Insecurity: Not on file   Transportation Needs: No Transportation Needs (10/31/2022)    PRAPARE - Transportation    • Lack of Transportation (Medical): No    • Lack of Transportation (Non-Medical):  No   Physical Activity: Not on file   Stress: Not on file   Social Connections: Not on file   Intimate Partner Violence: Not on file   Housing Stability: Not on file     Current Outpatient Medications on File Prior to Visit   Medication Sig   • acetaminophen (TYLENOL) 325 mg tablet every 6 (six) hours   • atorvastatin (LIPITOR) 40 mg tablet Take 1 tablet (40 mg total) by mouth daily at bedtime   • Brinzolamide-Brimonidine (Simbrinza) 1-0.2 % SUSP Apply to eye every 8 (eight) hours   • Cholecalciferol (Vitamin D-3) 25 MCG (1000 UT) CAPS Take by mouth   • Eliquis 5 MG TAKE 1 TABLET BY MOUTH  TWICE DAILY   • famotidine (PEPCID) 10 mg tablet Take 10 mg by mouth if needed for heartburn   • glucose blood (Wilmer Contour Test) test strip Test Once Daily   • guaiFENesin (MUCINEX) 600 mg 12 hr tablet Take 1,200 mg by mouth every 12 (twelve) hours   • Lancets MISC To test BS once daily.    • Lumigan 0.01 % ophthalmic drops Administer into the left eye     • nitroglycerin (NITROSTAT) 0.4 mg SL tablet Place 0.4 mg under the tongue as needed   • oxyCODONE-acetaminophen (PERCOCET) 5-325 mg per tablet Take 1 tablet by mouth every 6 (six) hours as needed for moderate pain Max Daily Amount: 4 tablets   • tiZANidine (ZANAFLEX) 2 mg tablet Take 1 tablet (2 mg total) by mouth 2 (two) times a day as needed for muscle spasms   • vitamin B-12 (VITAMIN B-12) 1,000 mcg tablet Take 1,000 mcg by mouth daily   • [DISCONTINUED] lisinopril (ZESTRIL) 20 mg tablet Take 1 tablet (20 mg total) by mouth daily   • [DISCONTINUED] metFORMIN (GLUCOPHAGE) 500 mg tablet Take 1 tablet (500 mg total) by mouth daily with breakfast   • [DISCONTINUED] metoprolol succinate (TOPROL-XL) 100 mg 24 hr tablet Take 1 tablet (100 mg total) by mouth daily     No Known Allergies  Immunization History   Administered Date(s) Administered   • COVID-19 MODERNA VACC 0.5 ML IM 01/28/2021, 02/25/2021, 10/27/2021, 07/15/2022   • INFLUENZA 09/09/2014, 10/02/2015, 10/04/2016, 11/01/2017, 10/05/2018, 11/07/2019, 08/28/2020, 10/03/2020, 10/14/2021   • Influenza Quadrivalent, 6-35 Months IM 11/01/2017   • Influenza Split High Dose Preservative Free IM 09/09/2014, 10/02/2015, 10/04/2016   • Influenza, high dose seasonal 0.7 mL 10/03/2020, 10/02/2023   • Pneumococcal 11/07/2019   • Pneumococcal Conjugate 13-Valent 07/21/2015   • TD (adult) Preservative Free 07/02/2015   • Tdap 11/02/2022   • Tetanus, adsorbed 07/02/2015   • Zoster 01/05/2013   • Zoster Vaccine Recombinant 05/20/2018   • influenza, trivalent, adjuvanted 09/26/2019       Objective     /72 (BP Location: Left arm, Patient Position: Sitting, Cuff Size: Adult)   Pulse 82   Temp 98.7 °F (37.1 °C)   Wt 66.5 kg (146 lb 9.6 oz)   SpO2 95%   BMI 25.16 kg/m²     Physical Exam  Vitals and nursing note reviewed. Constitutional:       General: He is not in acute distress. Appearance: He is well-developed. He is not diaphoretic. HENT:      Head: Normocephalic and atraumatic. Eyes:      General:         Right eye: No discharge. Conjunctiva/sclera: Conjunctivae normal.      Pupils: Pupils are equal, round, and reactive to light. Neck:      Thyroid: No thyromegaly. Cardiovascular:      Rate and Rhythm: Normal rate and regular rhythm. Pulmonary:      Effort: Pulmonary effort is normal. No respiratory distress. Breath sounds: Normal breath sounds. Musculoskeletal:      Cervical back: Normal range of motion. Lymphadenopathy:      Cervical: No cervical adenopathy. Skin:     General: Skin is warm and dry. Neurological:      Mental Status: He is alert and oriented to person, place, and time. Psychiatric:         Behavior: Behavior normal.         Thought Content:  Thought content normal.         Judgment: Judgment normal.       Josh Gonzales,

## 2023-10-03 ENCOUNTER — ANESTHESIA EVENT (OUTPATIENT)
Dept: PERIOP | Facility: HOSPITAL | Age: 88
End: 2023-10-03
Payer: MEDICARE

## 2023-10-06 ENCOUNTER — HOSPITAL ENCOUNTER (OUTPATIENT)
Facility: HOSPITAL | Age: 88
Setting detail: OUTPATIENT SURGERY
Discharge: HOME | End: 2023-10-06
Attending: OTOLARYNGOLOGY | Admitting: OTOLARYNGOLOGY
Payer: MEDICARE

## 2023-10-06 ENCOUNTER — ANESTHESIA (OUTPATIENT)
Dept: PERIOP | Facility: HOSPITAL | Age: 88
End: 2023-10-06
Payer: MEDICARE

## 2023-10-06 VITALS
SYSTOLIC BLOOD PRESSURE: 160 MMHG | DIASTOLIC BLOOD PRESSURE: 84 MMHG | BODY MASS INDEX: 23.92 KG/M2 | HEART RATE: 111 BPM | RESPIRATION RATE: 16 BRPM | WEIGHT: 139.33 LBS | TEMPERATURE: 98.7 F | OXYGEN SATURATION: 94 %

## 2023-10-06 DIAGNOSIS — C07 CANCER OF PAROTID GLAND (HCC): ICD-10-CM

## 2023-10-06 DIAGNOSIS — C44.90 SKIN CANCER: ICD-10-CM

## 2023-10-06 DIAGNOSIS — Z01.818 PREOPERATIVE TESTING: ICD-10-CM

## 2023-10-06 DIAGNOSIS — D68.9 COAGULATION DEFECT, UNSPECIFIED (HCC): ICD-10-CM

## 2023-10-06 DIAGNOSIS — Z01.812 PRE-PROCEDURE LAB EXAM: ICD-10-CM

## 2023-10-06 LAB
GLUCOSE SERPL-MCNC: 124 MG/DL (ref 65–140)
GLUCOSE SERPL-MCNC: 181 MG/DL (ref 65–140)

## 2023-10-06 PROCEDURE — 42420 EXCISE PAROTID GLAND/LESION: CPT | Performed by: OTOLARYNGOLOGY

## 2023-10-06 PROCEDURE — 82948 REAGENT STRIP/BLOOD GLUCOSE: CPT

## 2023-10-06 PROCEDURE — 14041 TIS TRNFR F/C/C/M/N/A/G/H/F: CPT | Performed by: OTOLARYNGOLOGY

## 2023-10-06 RX ORDER — LABETALOL HYDROCHLORIDE 5 MG/ML
10 INJECTION, SOLUTION INTRAVENOUS
Status: COMPLETED | OUTPATIENT
Start: 2023-10-06 | End: 2023-10-06

## 2023-10-06 RX ORDER — SUCCINYLCHOLINE/SOD CL,ISO/PF 100 MG/5ML
SYRINGE (ML) INTRAVENOUS AS NEEDED
Status: DISCONTINUED | OUTPATIENT
Start: 2023-10-06 | End: 2023-10-06

## 2023-10-06 RX ORDER — HYDROMORPHONE HCL/PF 1 MG/ML
0.5 SYRINGE (ML) INJECTION
Status: DISCONTINUED | OUTPATIENT
Start: 2023-10-06 | End: 2023-10-06 | Stop reason: HOSPADM

## 2023-10-06 RX ORDER — GLYCOPYRROLATE 0.2 MG/ML
INJECTION INTRAMUSCULAR; INTRAVENOUS AS NEEDED
Status: DISCONTINUED | OUTPATIENT
Start: 2023-10-06 | End: 2023-10-06

## 2023-10-06 RX ORDER — ACETAMINOPHEN 325 MG/1
650 TABLET ORAL EVERY 4 HOURS PRN
Status: DISCONTINUED | OUTPATIENT
Start: 2023-10-06 | End: 2023-10-06 | Stop reason: HOSPADM

## 2023-10-06 RX ORDER — LIDOCAINE HYDROCHLORIDE AND EPINEPHRINE 10; 10 MG/ML; UG/ML
INJECTION, SOLUTION INFILTRATION; PERINEURAL AS NEEDED
Status: DISCONTINUED | OUTPATIENT
Start: 2023-10-06 | End: 2023-10-06 | Stop reason: HOSPADM

## 2023-10-06 RX ORDER — DEXAMETHASONE SODIUM PHOSPHATE 10 MG/ML
INJECTION, SOLUTION INTRAMUSCULAR; INTRAVENOUS AS NEEDED
Status: DISCONTINUED | OUTPATIENT
Start: 2023-10-06 | End: 2023-10-06

## 2023-10-06 RX ORDER — SODIUM CHLORIDE, SODIUM LACTATE, POTASSIUM CHLORIDE, CALCIUM CHLORIDE 600; 310; 30; 20 MG/100ML; MG/100ML; MG/100ML; MG/100ML
125 INJECTION, SOLUTION INTRAVENOUS CONTINUOUS
Status: DISCONTINUED | OUTPATIENT
Start: 2023-10-06 | End: 2023-10-06 | Stop reason: HOSPADM

## 2023-10-06 RX ORDER — PROPOFOL 10 MG/ML
INJECTION, EMULSION INTRAVENOUS AS NEEDED
Status: DISCONTINUED | OUTPATIENT
Start: 2023-10-06 | End: 2023-10-06

## 2023-10-06 RX ORDER — ONDANSETRON 2 MG/ML
4 INJECTION INTRAMUSCULAR; INTRAVENOUS EVERY 6 HOURS PRN
Status: DISCONTINUED | OUTPATIENT
Start: 2023-10-06 | End: 2023-10-06 | Stop reason: HOSPADM

## 2023-10-06 RX ORDER — ONDANSETRON 2 MG/ML
4 INJECTION INTRAMUSCULAR; INTRAVENOUS ONCE AS NEEDED
Status: DISCONTINUED | OUTPATIENT
Start: 2023-10-06 | End: 2023-10-06 | Stop reason: HOSPADM

## 2023-10-06 RX ORDER — LIDOCAINE HYDROCHLORIDE 20 MG/ML
INJECTION, SOLUTION EPIDURAL; INFILTRATION; INTRACAUDAL; PERINEURAL AS NEEDED
Status: DISCONTINUED | OUTPATIENT
Start: 2023-10-06 | End: 2023-10-06

## 2023-10-06 RX ORDER — ONDANSETRON 2 MG/ML
INJECTION INTRAMUSCULAR; INTRAVENOUS AS NEEDED
Status: DISCONTINUED | OUTPATIENT
Start: 2023-10-06 | End: 2023-10-06

## 2023-10-06 RX ORDER — CEFAZOLIN SODIUM 2 G/50ML
2000 SOLUTION INTRAVENOUS ONCE
Status: COMPLETED | OUTPATIENT
Start: 2023-10-06 | End: 2023-10-06

## 2023-10-06 RX ORDER — FENTANYL CITRATE 50 UG/ML
INJECTION, SOLUTION INTRAMUSCULAR; INTRAVENOUS AS NEEDED
Status: DISCONTINUED | OUTPATIENT
Start: 2023-10-06 | End: 2023-10-06

## 2023-10-06 RX ORDER — OXYCODONE HYDROCHLORIDE AND ACETAMINOPHEN 5; 325 MG/1; MG/1
2 TABLET ORAL EVERY 4 HOURS PRN
Status: DISCONTINUED | OUTPATIENT
Start: 2023-10-06 | End: 2023-10-06 | Stop reason: HOSPADM

## 2023-10-06 RX ORDER — DEXTROSE AND SODIUM CHLORIDE 5; .9 G/100ML; G/100ML
100 INJECTION, SOLUTION INTRAVENOUS CONTINUOUS
Status: DISCONTINUED | OUTPATIENT
Start: 2023-10-06 | End: 2023-10-06 | Stop reason: HOSPADM

## 2023-10-06 RX ORDER — FENTANYL CITRATE/PF 50 MCG/ML
25 SYRINGE (ML) INJECTION
Status: DISCONTINUED | OUTPATIENT
Start: 2023-10-06 | End: 2023-10-06 | Stop reason: HOSPADM

## 2023-10-06 RX ADMIN — DEXAMETHASONE SODIUM PHOSPHATE 10 MG: 10 INJECTION INTRAMUSCULAR; INTRAVENOUS at 12:44

## 2023-10-06 RX ADMIN — SODIUM CHLORIDE, SODIUM LACTATE, POTASSIUM CHLORIDE, AND CALCIUM CHLORIDE 125 ML/HR: .6; .31; .03; .02 INJECTION, SOLUTION INTRAVENOUS at 11:35

## 2023-10-06 RX ADMIN — PHENYLEPHRINE HYDROCHLORIDE 50 MCG/MIN: 10 INJECTION INTRAVENOUS at 12:25

## 2023-10-06 RX ADMIN — Medication 10 MG: at 17:12

## 2023-10-06 RX ADMIN — PROPOFOL 120 MG: 10 INJECTION, EMULSION INTRAVENOUS at 12:25

## 2023-10-06 RX ADMIN — REMIFENTANIL HYDROCHLORIDE 0.1 MCG/KG/MIN: 1 INJECTION, POWDER, LYOPHILIZED, FOR SOLUTION INTRAVENOUS at 12:24

## 2023-10-06 RX ADMIN — FENTANYL CITRATE 50 MCG: 50 INJECTION INTRAMUSCULAR; INTRAVENOUS at 15:35

## 2023-10-06 RX ADMIN — Medication 60 MG: at 12:25

## 2023-10-06 RX ADMIN — Medication 10 MG: at 16:47

## 2023-10-06 RX ADMIN — CEFAZOLIN SODIUM 2000 MG: 2 SOLUTION INTRAVENOUS at 12:13

## 2023-10-06 RX ADMIN — SODIUM CHLORIDE, SODIUM LACTATE, POTASSIUM CHLORIDE, AND CALCIUM CHLORIDE: .6; .31; .03; .02 INJECTION, SOLUTION INTRAVENOUS at 15:04

## 2023-10-06 RX ADMIN — ONDANSETRON 4 MG: 2 INJECTION INTRAMUSCULAR; INTRAVENOUS at 12:44

## 2023-10-06 RX ADMIN — LIDOCAINE HYDROCHLORIDE 100 MG: 20 INJECTION, SOLUTION EPIDURAL; INFILTRATION; INTRACAUDAL at 12:25

## 2023-10-06 RX ADMIN — GLYCOPYRROLATE 0.2 MG: 0.2 INJECTION INTRAMUSCULAR; INTRAVENOUS at 17:39

## 2023-10-06 RX ADMIN — ACETAMINOPHEN 325MG 325 MG: 325 TABLET ORAL at 18:50

## 2023-10-06 RX ADMIN — PROPOFOL 30 MG: 10 INJECTION, EMULSION INTRAVENOUS at 12:32

## 2023-10-06 NOTE — ANESTHESIA POSTPROCEDURE EVALUATION
Post-Op Assessment Note    CV Status:  Stable  Pain Score: 0    Pain management: adequate     Mental Status:  Alert and awake   Hydration Status:  Euvolemic   PONV Controlled:  Controlled   Airway Patency:  Patent  Airway: intubated   Two or more mitigation strategies used for obstructive sleep apnea   Post Op Vitals Reviewed: Yes      Staff: Anesthesiologist, CRNA         No notable events documented.     BP (!) 183/90 (10/06/23 1630)    Temp      Pulse (!) 108 (10/06/23 1630)   Resp 20 (10/06/23 1630)    SpO2 97 % (10/06/23 1630)

## 2023-10-06 NOTE — INTERVAL H&P NOTE
H&P reviewed. After examining the patient I find no changes in the patients condition since the H&P had been written.     Vitals:    10/06/23 1140   BP: (!) 171/80   Pulse: 83   Resp:    Temp:    SpO2:

## 2023-10-06 NOTE — DISCHARGE INSTR - AVS FIRST PAGE
Angelika Willoughby  1935      [facility]      POST OPERATIVE PAROTID GLAND REMOVAL INSTRUCTIONS    1. Keep area of incision clean. Keep antibiotic ointment on incision site at all times. Examples of antibiotic ointment include bacitracin, Mupirocin, an triple antibiotic ointment. 2.  If blood accumulates on incision, clean with hydrogen peroxide and gauze prior to application of the antibiotic ointment. 3.  You may shower but do not soak wound. 4.  No smoking. Avoid secondhand smoke exposure. This will delay wound healing and possibly cause flap necrosis (skin over the gland will become black and slough off). 5.  No heavy lifting, straining, or exercise until approved by your surgeon. 6.  Use ACE wrap around face as directed as much as possible during the first post operative week. 7.  Call office at [phone] or [phone] for:  Fever greater than 101°F, redness or warmth of surgical area, discoloration of skin in surgical area, acute swelling of surgical area, discharge from surgical area, or acute movement disorder of facial muscles. 8.  If you were given a prescription for pain medication follow appropriate directions on label. If no prescription was given you may take over the counter pain medication as needed. 9.  If you were given a prescription for steroids (Prednisone, Prednisolone) you may begin taking this the day following your surgical procedure.

## 2023-10-06 NOTE — ANESTHESIA PREPROCEDURE EVALUATION
Procedure:  TOTAL PAROTIDECTOMY W/ NIMS W/ SIGNIFICANT AMOUNT OF SKIN WILL BE REMOVED & SKIN FLAP OR PRIMARY CLOSURE (Left: Neck)    Relevant Problems   CARDIO   (+) Abdominal aortic aneurysm (AAA) without rupture (HCC)   (+) Benign essential hypertension   (+) Coronary artery disease involving native coronary artery   (+) Hypertriglyceridemia   (+) Paroxysmal atrial fibrillation (HCC)      ENDO   (+) Type 2 diabetes mellitus with complication, without long-term current use of insulin (HCC)      GI/HEPATIC   (+) Carcinoma of left colon (HCC)      /RENAL   (+) Stage 3a chronic kidney disease (HCC)      HEMATOLOGY   (+) Coagulation defect, unspecified (HCC)      MUSCULOSKELETAL   (+) Chronic low back pain      NEURO/PSYCH   (+) Chronic low back pain      Cardiovascular and Mediastinum   (+) Chronic arterial ischemic stroke   (+) Chronic systolic heart failure (HCC)      Digestive   (+) Cancer of parotid gland (HCC)      Other   (+) Glaucoma   (+) History of malignant neoplasm of colon   (+) Physical deconditioning   (+) Positive depression screening        Physical Exam    Airway    Mallampati score: II  TM Distance: >3 FB  Neck ROM: full     Dental   No notable dental hx     Cardiovascular  Rhythm: regular, Rate: normal, Cardiovascular exam normal    Pulmonary  Pulmonary exam normal Breath sounds clear to auscultation,     Other Findings        Anesthesia Plan  ASA Score- 4     Anesthesia Type- general with ASA Monitors. Additional Monitors:   Airway Plan: ETT. Comment: EFs documented in Cardiac Clearance. Had been 25% in 2019 @ time of MCA stroke. Now between 40% and 50%. Chronic CHF. Plan Factors-    Chart reviewed. EKG reviewed. Existing labs reviewed. Patient summary reviewed. Patient is not a current smoker. Patient not instructed to abstain from smoking on day of procedure. Patient did not smoke on day of surgery.     There is medical exclusion for perioperative obstructive sleep apnea risk education. Induction- intravenous. Postoperative Plan-     Informed Consent- Anesthetic plan and risks discussed with patient and spouse.

## 2023-10-06 NOTE — OP NOTE
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]  Skin cancer [C44.90]  Preoperative testing [Z01.818]  Pre-procedure lab exam [Z01.812]  Coagulation defect, unspecified ([address]) [D68.9]    Post-Op Diagnosis Codes:     * Cancer of parotid gland ([address]) [C07]     * Skin cancer [C44.90]     * Preoperative testing [Z01.818]     * Pre-procedure lab exam [E31.695]     * Coagulation defect, unspecified ([address]) [D68.9]    Procedure(s):  Left - TOTAL PAROTIDECTOMY W/ NIMS W/ SIGNIFICANT AMOUNT OF SKIN WILL BE REMOVED & SKIN FLAP OR PRIMARY CLOSURE  Advancement flap repair of skin defect    Specimen(s):  ID Type Source Tests Collected by Time Destination   1 : Left Superfical Parotid Tissue Parotid gland TISSUE EXAM Eleni Brunson, DO 10/6/2023  2:10 PM    2 : Left Deep Lobe Tissue Parotid gland TISSUE EXAM Eleni Brunson, DO 10/6/2023  2:19 PM        Estimated Blood Loss:   Minimal    Drains:  * No LDAs found *    Anesthesia Type:   General    Operative Indications:  Cancer of parotid gland ([address]) [C07]  Skin cancer [C44.90]  Preoperative testing [Z01.818]  Pre-procedure lab exam [Z01.812]  Coagulation defect, unspecified ([address]) [D68.9]      Operative Findings:  Large tumor left parotid - invading through skin - superior to this is a BCCA of the skin    Complications:   None    Procedure and Technique:  Patient was identified in the holding area. the patient was marked on the left side to denote the laterality of the case. Patient was given Ancef 2 g IV on-call to the operating room. Patient was placed on the OR table in supine position and placed under general anesthesia without any difficulty with an endotracheal tube.   Patient was placed on a shoulder roll and the head was turned to the right. The table was turned 180 degrees and the left face, neck and anterior chest were cleansed with alcohol pads and allowed to dry appropriately. Marking pen was used to anabela out a modified Avelino incision which was altered to allow the skin to remain attached to the underlying tumor - the skin which was going to be removed measured about 5 x 8 cm and then proposed incision was injected with 1% Xylocaine with 1 100,000 epinephrine to a total of 10 mL after a brief timeout was obtained. Facial leads x4 and chest leads x 2 were applied and the Nims unit was noted to be working appropriately. At this point patient was prepped draped in sterile fashion. Formal timeout was now obtained and all information was noted to be correct. Previously marked injected area was incised with a 15 blade taken down to the subcutaneous tissue. Any bleeding vessels were cauterized with Bovie cautery. This was taken down into the deeper tissues and  the posterior edge of the tumor from the anterior border the sternocleidomastoid. I also used the Bovie to expose the posterior edge of the parotid from the external cartilaginous canal.  After this I switched to a hemostat and harmonic scalpel and worked deep down the canal until identify the main trunk of the facial nerve. This was very difficult since this was a sclerotic and invasive tumor which was firmly attached to many branches of the facial nerve. Keeping the nerve in view the whole time I removed the tumor from above the nerve with a combination of hemostat and harmonic dissection. Ultimately I was able to remove the entire superficial lobe and preserve all branches of the nerve. I then spent a significant amount of time removing the deep lobe which was also surrounding all lower branches of the nerve. In many locations the tumor needed to be cut off of the nerve with a blade.   The facial artery was cut and seemed to be invaded by tumor - this was tied off with silk ties and a suture ligature. The parotid duct was  in a similar manner. Ultimately I was able to remove the entire tumor leaving all branches intact and working with the nerve monitor during stimulation. The nerve was then restimulated at its base and all branches were noted to be working appropriately. There was no significant bleeding. Flap was then released from the drape and a piece of Nu-Knit Surgicel was placed over the facial nerve. In order to close the wound a large posterior inferior advancement flap was created - the defect measured 5 x 8 cm. The flap was injected with 8 ml of the same local anesthesia and was raised under the skin and rotated into the wound. Wound was then closed in a layered fashion using interrupted 4-0 Vicryl for the deep tissue and staples for the skin. Patient  was cleaned with wet-to-dry's after facial leads and chest leads were removed. The leads  were disposed of appropriately. Antibiotic ointment followed by fluffs (4 x 4's) and a facelift dressing was applied. Patient was then awoken and extubated and taken to the recovery in stable condition. I was present for the entire procedure.     Patient Disposition:  PACU     This procedure was not performed to treat primary cutaneous melanoma through wide local excision      SIGNATURE: Andrew Carr DO  DATE: October 6, 2023  TIME: 4:07 PM

## 2023-10-24 ENCOUNTER — RADIATION ONCOLOGY CONSULT (OUTPATIENT)
Dept: RADIATION ONCOLOGY | Facility: CLINIC | Age: 88
End: 2023-10-24
Attending: RADIOLOGY
Payer: MEDICARE

## 2023-10-24 VITALS
BODY MASS INDEX: 24.41 KG/M2 | WEIGHT: 146.5 LBS | TEMPERATURE: 98.1 F | HEART RATE: 66 BPM | HEIGHT: 65 IN | OXYGEN SATURATION: 97 % | DIASTOLIC BLOOD PRESSURE: 96 MMHG | SYSTOLIC BLOOD PRESSURE: 168 MMHG

## 2023-10-24 DIAGNOSIS — C07 CANCER OF PAROTID GLAND (HCC): Primary | ICD-10-CM

## 2023-10-24 PROBLEM — C18.6: Status: RESOLVED | Noted: 2022-04-27 | Resolved: 2023-10-24

## 2023-10-24 PROCEDURE — 99205 OFFICE O/P NEW HI 60 MIN: CPT | Performed by: RADIOLOGY

## 2023-10-24 PROCEDURE — 99211 OFF/OP EST MAY X REQ PHY/QHP: CPT | Performed by: RADIOLOGY

## 2023-10-24 RX ORDER — TRAMADOL HYDROCHLORIDE 50 MG/1
50 TABLET ORAL EVERY 6 HOURS PRN
COMMUNITY

## 2023-10-24 NOTE — PROGRESS NOTES
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 Referred by Dr. Blaine Cuba. Additional medical problems include DM2, HTN, CHF, CAD, atrial fibrillation, and chronic systolic heart failure. Also has history of colon cancer and skin cancer. Patient was seen by his PCP Dr. Artemio Waldrop on May 1 and did not have left neck mass. Patient noticed a nontender swelling in the left neck in early . He saw Dr. Artemio Waldrop again on  and was found to have a firm lump in left superior anterior cervical region. US of head and neck was ordered     23  US Head and Neck [facility])  FINDINGS:   Location in body: Location is nonspecific, inferior to the parotid   Location in tissues: subcutaneous   Size: 1.7 x 1.6 x 1.4 cm   Composition/Echotexture: heterogeneous cystic and solid compared to surrounding   tissues   Margins: ill-defined   Vascularity: barely detectable internal vascularity on color Doppler mode   IMPRESSION:   1. Lesion(s) described above which is consistent with a soft tissue neoplasm,   but is otherwise nonspecific. 2. Recommendations: MRI with contrast.      23 CT Neck with contrast  IMPRESSION:  1.   Corresponding to the findings on prior outside ultrasound, there is 1.7 cm complex cystic and solid subcutaneous lesion inseparable from posteroinferior left parotid gland. Differentials include infectious process (abscess), given mild stranding of   adjacent subcutaneous fat, versus neoplasm. Recommend tissue sampling. 2.  No cervical lymphadenopathy. 7/27/23  Dr. Elin Acosta  Suspect parotid neoplasm may be neoplastic and growing into surrounding skin. Suspect that skin lesion is a neoplasm  FNA completed and shave biopsy completed     7/27/23  Fine Needle Aspirate  A-B. Parotid gland, (ThinPrep and smear preparations): Malignant (Matthew category VI)  Favor Non-Small Cell Carcinoma     7/27/23  Tissue Exam  A. Skin Lesion, left cheek/neck, shave biopsy:  - Portions of basal cell carcinoma, infiltrating and nodular types, present at tissue edges    of involved biopsy fragments. See Note. -- Confirmed by positive p40 and BUDDY-EP4 immunostains. Note     Focal areas of possible squamous differentiation are noted; a component of basosquamous carcinoma cannot be excluded      8/22/23  PET CT  IMPRESSION:   1. Focal radiotracer uptake at the left posterior inferior parotid gland lesion. This would correspond to the lesion seen on recent neck CT compatible with known malignancy. 2.  There is a small focus of uptake deeper in the left parotid gland just posterior to the mandible. No obvious findings on CT. Question intraparotid rao metastasis or versus synchronous lesion. 3. Focal uptake along the skin superficial to the left parotid, question inflammatory or related to skin lesion. 4.  Otherwise no FDG avid lymph nodes in the neck. 5.  No findings for distant hypermetabolic metastasis in the chest, abdomen and pelvis. 6. Infrarenal abdominal aortic aneurysm somewhat saccular in shape measuring up to 3.8 cm in diameter, stable since 2021. This can be followed with US in 2-3 years. 8/31/23  Dr. Elin Acosta  Pathology reviewed.   Discussed surgery: Left total parotidectomy with nerve integrity monitoring system     10/6/23 Tissue Exam  Preliminary Diagnosis   Outside expert review pending     A. Left superficial parotid gland (parotidectomy):     - Involvement by high-grade carcinoma. B. Left deep parotid gland (parotidectomy):     - Involvement by high-grade carcinoma. 10/13/23  Dr. Humphrey Sterling  Skin is healing. Some staples have been removed. Will return in 1 week for removal of remainder. Preliminary pathology discussed. Informed he will likely need RT, consult to radiation oncology made     10/20/23  ENT, [facility], [facility]  All remaining staples have been removed. Has f/u appointment scheduled with radiation oncology . Will see in office in 1 months     Patient is seen today for consultation with his wife as well as his son Judi Brito. He has 3 children that are healthy with 1 son and 2 daughters. He walks with a walker for the last several years. His son reports he can drive him for daily radiation treatments. 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 He has good vision in the left eye. He denies any previous radiation therapy and no chemotherapy. He had a colon carcinoma when he was in his late 63s status post surgery with a small primary tumor. He reported taking oral chemotherapy for several months but did not require any radiation therapy. He is a retired  and retired at the age of 77. He has full upper and lower dentures.     Upcomin23  Dr. Humphrey Sterling  23 Dr. Sheri Olmstead   Oncology History   Cancer of left parotid gland [facility])   10/2/2023 Initial Diagnosis    Cancer of parotid gland [facility])       Past Medical History:   Diagnosis Date    Acute ischemic left MCA stroke ([address]) 2019    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 MRI small bleed CT 8-19 no bleed No neurology follow up LINQ 6-19 PAF seen    Last Assessment & Plan:  Good recovery CVA- recent LINQ trasmission showed PAF: Would like to start Eliquis but because had CNS bleed needs neurology evaluation make sure ok. No change meds.     NICOLE (acute kidney injury) ([address]) 2020    Colon cancer [facility])     age 64's    colon resection     chemo pills    Diabetes mellitus ([address])     Fall     fell backwards from riding -  in ER-ok- incidental finding CXR   R lobe 2 masses-bx today    2021    Full dentures     Hyperlipidemia     Hypertension     Lung mass     R side x2,bx today 2021    Seasonal allergies     Stroke ([address])     2019     speech loss     minimal residual    Vitreous hemorrhage of right eye ([address]) 2016    R artificial eye    Walker as ambulation aid     Wears glasses      Past Surgical History:   Procedure Laterality Date    CATARACT EXTRACTION      COLECTOMY      COLONOSCOPY      EYE SURGERY      R eye removed for hemorrhage    MD EXC PRTD HAYDEN/PRTD GLND TOT DSJ&PRSRV FACIAL NR Left 10/6/2023    Procedure: TOTAL PAROTIDECTOMY W/ NIMS W/ SIGNIFICANT AMOUNT OF SKIN WILL BE REMOVED & SKIN FLAP OR PRIMARY CLOSURE;  Surgeon: Pat Strickland DO;  Location: [facility];  Service: ENT    SEPTOPLASTY       Family History   Family history unknown: Yes     Social History   Social History     Substance and Sexual Activity   Alcohol Use Yes    Alcohol/week: 1.0 standard drink of alcohol    Types: 1 Cans of beer per week    Comment: was drinking 1 beer daily     Social History     Substance and Sexual Activity   Drug Use Never     Social History     Tobacco Use   Smoking Status Former    Packs/day: 1.00    Years: 40.00    Total pack years: 40.00    Types: Cigarettes    Quit date:     Years since quittin.8   Smokeless Tobacco Never   Tobacco Comments    quit 30 years ago (as of 2018)     Meds/Allergies     Current Outpatient Medications:     acetaminophen (TYLENOL) 325 mg tablet, every 6 (six) hours, Disp: , Rfl:     atorvastatin (LIPITOR) 40 mg tablet, Take 1 tablet (40 mg total) by mouth daily at bedtime, Disp: 90 tablet, Rfl: 3    Brinzolamide-Brimonidine (Simbrinza) 1-0.2 % SUSP, Apply to eye every 8 (eight) hours, Disp: , Rfl:     Cholecalciferol (Vitamin D-3) 25 MCG (1000 UT) CAPS, Take by mouth, Disp: , Rfl:     Eliquis 5 MG, TAKE 1 TABLET BY MOUTH  TWICE DAILY, Disp: 180 tablet, Rfl: 3    famotidine (PEPCID) 10 mg tablet, Take 10 mg by mouth if needed for heartburn, Disp: , Rfl:     glucose blood (Wilmer Contour Test) test strip, Test Once Daily, Disp: 100 each, Rfl: 1    guaiFENesin (MUCINEX) 600 mg 12 hr tablet, Take 1,200 mg by mouth every 12 (twelve) hours, Disp: , Rfl:     Lancets MISC, To test BS once daily. , Disp: 100 each, Rfl: 3    lisinopril (ZESTRIL) 20 mg tablet, Take 1 tablet (20 mg total) by mouth daily, Disp: 90 tablet, Rfl: 3    Lumigan 0.01 % ophthalmic drops, Administer into the left eye  , Disp: , Rfl:     metFORMIN (GLUCOPHAGE) 500 mg tablet, Take 1 tablet (500 mg total) by mouth daily with breakfast, Disp: 90 tablet, Rfl: 3    metoprolol succinate (TOPROL-XL) 100 mg 24 hr tablet, Take 1 tablet (100 mg total) by mouth daily, Disp: 90 tablet, Rfl: 3    tiZANidine (ZANAFLEX) 2 mg tablet, Take 1 tablet (2 mg total) by mouth 2 (two) times a day as needed for muscle spasms, Disp: 20 tablet, Rfl: 0    traMADol (ULTRAM) 50 mg tablet, Take 50 mg by mouth every 6 (six) hours as needed for moderate pain, Disp: , Rfl:     vitamin B-12 (VITAMIN B-12) 1,000 mcg tablet, Take 1,000 mcg by mouth daily, Disp: , Rfl:     nitroglycerin (NITROSTAT) 0.4 mg SL tablet, Place 0.4 mg under the tongue as needed (Patient not taking: Reported on 10/24/2023), Disp: , Rfl:     oxyCODONE-acetaminophen (PERCOCET) 5-325 mg per tablet, Take 1 tablet by mouth every 6 (six) hours as needed for moderate pain Max Daily Amount: 4 tablets (Patient not taking: Reported on 10/24/2023), Disp: 20 tablet, Rfl: 0  No Known Allergies    Review of Systems  Constitutional:  Positive for appetite change and fatigue. Negative for unexpected weight change. HENT:  Positive for dental problem and facial swelling. Negative for trouble swallowing. Full upper and lower dentures   Eyes:  Negative for visual disturbance. Glasses  Glass eye on right  Glaucoma in left eye   Respiratory: Negative. Cardiovascular: Negative. Gastrointestinal: Negative. Endocrine: Negative. Genitourinary:  Positive for frequency and urgency. Negative for dysuria. Musculoskeletal:  Positive for gait problem (ambulates with walker). Skin: Negative. Allergic/Immunologic: Negative. Hematological:  Bruises/bleeds easily. Psychiatric/Behavioral:  Positive for sleep disturbance. Clinical Trial: no    OBJECTIVE:   /96 (BP Location: Left arm, Patient Position: Sitting, Cuff Size: Standard)   Pulse 66   Temp 98.1 °F (36.7 °C) (Temporal)   Ht 5' 5" (1.651 m)   Wt 66.5 kg (146 lb 8 oz)   SpO2 97%   BMI 24.38 kg/m²   Pain Assessment:  1  Performance Status: ECOG/Zubrod/WHO: 2 - Symptomatic, <50% confined to bed    Physical Exam  Vitals and nursing note reviewed. Constitutional:       General: He is not in acute distress. Appearance: Normal appearance. He is well-developed. He is not ill-appearing or diaphoretic. HENT:      Head: Normocephalic and atraumatic. Mouth/Throat:      Mouth: Mucous membranes are moist.      Pharynx: No oropharyngeal exudate or posterior oropharyngeal erythema. Comments: He is edentulous and has full dentures. Eyes:      General: No scleral icterus. Conjunctiva/sclera: Conjunctivae normal.      Pupils: Pupils are equal, round, and reactive to light. 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 Neck:      Thyroid: No thyromegaly. Vascular: No carotid bruit. Trachea: No tracheal deviation.       Comments: Left neck/parotid incision is healing well with some underlying induration and no nodularity as well as no lymphadenopathy. Cardiovascular:      Rate and Rhythm: Normal rate and regular rhythm. Heart sounds: Normal heart sounds. Pulmonary:      Effort: Pulmonary effort is normal. No respiratory distress. Breath sounds: Normal breath sounds. No wheezing or rales. Abdominal:      General: Bowel sounds are normal. There is no distension. Palpations: Abdomen is soft. There is no mass. Tenderness: There is no abdominal tenderness. Musculoskeletal:         General: No swelling or tenderness. Normal range of motion. Cervical back: Normal range of motion and neck supple. No rigidity or tenderness. Right lower leg: No edema. Left lower leg: No edema. Comments: He walks with a walker with a slow shuffled gait. Lymphadenopathy:      Cervical: No cervical adenopathy. Skin:     General: Skin is warm and dry. Coloration: Skin is not pale. Findings: No erythema or rash. Neurological:      General: No focal deficit present. Mental Status: He is alert and oriented to person, place, and time. Cranial Nerves: No cranial nerve deficit. Coordination: Coordination normal.   Psychiatric:         Mood and Affect: Mood normal.         Behavior: Behavior normal.         Thought Content: Thought content normal.         Judgment: Judgment normal.       RESULTS  Lab Results    Chemistry        Component Value Date/Time    K 3.9 07/05/2021 0938     07/05/2021 0938    CO2 29 07/05/2021 0938    BUN 13 07/05/2021 0938    CREATININE 1.20 07/05/2021 0938        Component Value Date/Time    CALCIUM 8.4 07/05/2021 0938    ALKPHOS 40 (L) 05/18/2017 1117    AST 39 05/18/2017 1117    ALT 71 05/18/2017 1117        Lab Results   Component Value Date    WBC 6.47 12/21/2021    HGB 12.8 12/21/2021    HCT 38.7 12/21/2021    MCV 93 12/21/2021     12/21/2021     Imaging Studies: See Above    Pathology: See Above    ASSESSMENT  1.  Cancer of parotid gland [facility])  Ambulatory referral to Radiation Oncology    Radiation Simulation Treatment          Cancer Staging   Cancer of left parotid gland [facility])  Staging form: Major Salivary Glands, AJCC 8th Edition  - Clinical stage from 10/24/2023: Stage I (cT1, cN0, cM0) - Signed by David Light MD on 10/25/2023  Histopathologic type: Carcinoma, undifferentiated, NOS  Stage prefix: Initial diagnosis  Laterality: Left  Tumor size (mm): 17       PLAN/DISCUSSION  Orders Placed This Encounter   Procedures    Radiation Simulation Treatment        Jory Lopez is a 81y.o. 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 He had fine-needle aspiration of the left parotid July 27, 2023 that revealed malignancy favoring non-small cell carcinoma. He had work-up with a neck CT with contrast as well as a PET/CT study that revealed a 1.7 cm maximal diameter left posterior inferior parotid gland lesion with an SUV of 7.6 compatible with malignancy. There was a small focus of uptake deeper in the parotid gland and just posterior to the mandible. There was no evidence of cervical adenopathy and no distant metastatic disease. There was focal uptake along the skin superficial to the left parotid gland. He did have a left cheek basal cell carcinoma excised on July 27, 2023. He is status post left total parotidectomy with nerve sparing integrity monitoring system with Dr. Ton Beyer on October 6, 2023. The preliminary pathology report reveals high-grade carcinoma involving the left superficial and left deep parotid gland. The final pathology is pending at this time. The patient has at least clinical stage I, T1 N0 M0 disease. He may have more advanced stage disease depending on his final pathology report. We discussed the postoperative radiation therapy can be considered after resection of stage I parotid gland carcinomas with high-grade disease since they have a higher risk of local recurrence.   If final pathology report shows adverse features such as positive margins and/or positive lymph nodes, then postoperative radiation therapy would be indicated to the left parotid bed as well as left neck over 6-1/2 weeks for 33 fractions to a total dose of 6600 cGy. We discussed the rational for treatment including the acute side effects and the potential chronic complications with him. He is not enthusiastic about receiving postoperative radiation therapy and wants to think about this and discuss it further with his wife and son. He also wants to wait to his final pathology report returns. He was tentatively given an appointment to return in 2 weeks for CT simulation and to go over his final pathology report. Ken Henley MD  10/24/2023,12:22 PM      Portions of the record may have been created with voice recognition software. Occasional wrong word or "sound a like" substitutions may have occurred due to the inherent limitations of voice recognition software. Read the chart carefully and recognize, using context, where substitutions have occurred.

## 2023-10-24 NOTE — PROGRESS NOTES
Nora Vazquez 1935 is a 81 y.o. 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 Referred by Dr. Pooja Wood. Additional medical problems include DM2, HTN, CHF, CAD, atrial fibrillation, chronic systolic heart failure. Also has history of colon cancer and skin cancer. Seen by PCP for routine visit. Was found to have a firm lump in left superior anterior cervical region. US of head and neck was ordered    6/27/23  US Head and Neck [facility])  FINDINGS:   Location in body: Location is nonspecific, inferior to the parotid   Location in tissues: subcutaneous   Size: 1.7 x 1.6 x 1.4 cm   Composition/Echotexture: heterogeneous cystic and solid compared to surrounding   tissues   Margins: ill-defined   Vascularity: barely detectable internal vascularity on color Doppler mode   IMPRESSION:   1. Lesion(s) described above which is consistent with a soft tissue neoplasm,   but is otherwise nonspecific. 2. Recommendations: MRI with contrast.     7/27/23  Dr. Pooja Wood  Suspect parotid neoplasm may be neoplastic and growing into surrounding skin. Suspect that skin lesion is a neoplasm  FNA completed and shave biopsy completed    7/27/23  Fine Needle Aspirate  A-B. Parotid gland, (ThinPrep and smear preparations): Malignant (Matthew category VI)  Favor Non-Small Cell Carcinoma    7/27/23  Tissue Exam  A. Skin Lesion, left cheek/neck, shave biopsy:  - Portions of basal cell carcinoma, infiltrating and nodular types, present at tissue edges    of involved biopsy fragments. See Note. -- Confirmed by positive p40 and [id number] immunostains. Note    Focal areas of possible squamous differentiation are noted; a component of basosquamous carcinoma cannot be excluded     8/22/23  PET CT  IMPRESSION:   1. Focal radiotracer uptake at the left posterior inferior parotid gland lesion. This would correspond to the lesion seen on recent neck CT compatible with known malignancy.   2. There is a small focus of uptake deeper in the left parotid gland just posterior to the mandible. No obvious findings on CT. Question intraparotid rao metastasis or versus synchronous lesion. 3. Focal uptake along the skin superficial to the left parotid, question inflammatory or related to skin lesion. 4.  Otherwise no FDG avid lymph nodes in the neck. 5.  No findings for distant hypermetabolic metastasis in the chest, abdomen and pelvis. 6. Infrarenal abdominal aortic aneurysm somewhat saccular in shape measuring up to 3.8 cm in diameter, stable since . This can be followed with US in 2-3 years. 23  Dr. Chapis Chamberlain  Pathology reviewed. Discussed surgery: Left total parotidectomy with nerve integrity monitoring system    10/6/23  Tissue Exam  Preliminary Diagnosis   Outside expert review pending     A. Left superficial parotid gland (parotidectomy):     - Involvement by high-grade carcinoma. B. Left deep parotid gland (parotidectomy):     - Involvement by high-grade carcinoma. 10/13/23  Dr. Chapis Chamberlain  Skin is healing. Some staples have been removed. Will return in 1 week for removal of remainder. Preliminary pathology discussed. Informed he will likely need RT, consult to radiation oncology made    10/20/23  ENT, Abida Magana PA-C  All remaining staples have been removed. Has f/u appointment scheduled with radiation oncology . Will see in office in 1 months    Upcomin23  Dr. Chapis Chamberlain        Oncology History   Cancer of parotid gland [facility])   10/2/2023 Initial Diagnosis    Cancer of parotid gland ([address])         Review of Systems:  Review of Systems   Constitutional:  Positive for appetite change and fatigue. Negative for unexpected weight change. HENT:  Positive for dental problem and facial swelling. Negative for trouble swallowing. Full upper and lower dentures   Eyes:  Negative for visual disturbance.         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 Cardiovascular: Negative. Gastrointestinal: Negative. Endocrine: Negative. Genitourinary:  Positive for frequency and urgency. Negative for dysuria. Musculoskeletal:  Positive for gait problem (ambulates with walker). Skin: Negative. Allergic/Immunologic: Negative. Hematological:  Bruises/bleeds easily. Psychiatric/Behavioral:  Positive for sleep disturbance. Clinical Trial: no      Pain assessment: 1      Prior Radiation none    Teaching  [facility] radiation oncology booklet given. Head and neck book given    MST completed    Implantable Devices (Port, pacemaker, pain stimulator) none    Hip Replacement none    Health Maintenance   Topic Date Due    Pneumococcal Vaccine: 65+ Years (2 - PPSV23 if available, else PCV20) 01/02/2020    PT PLAN OF CARE  09/22/2021    DM Eye Exam  04/08/2022    COVID-19 Vaccine (5 - Moderna series) 09/09/2022    Fall Risk  10/31/2023    Medicare Annual Wellness Visit (AWV)  10/31/2023    Diabetic Foot Exam  10/31/2023    HEMOGLOBIN A1C  12/06/2023    Depression Screening  05/01/2024    Kidney Health Evaluation: Albumin/Creatinine Ratio  06/06/2024    Kidney Health Evaluation: GFR  09/04/2024    BMI: Adult  10/20/2024    Influenza Vaccine  Completed    HIB Vaccine  Aged Out    IPV Vaccine  Aged Out    Hepatitis A Vaccine  Aged Out    Meningococcal ACWY Vaccine  Aged Out    HPV Vaccine  Aged Out    Colonoscopy  Discontinued       Past Medical History:   Diagnosis Date    Acute ischemic left MCA stroke ([address]) 06/21/2019    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 MRI small bleed CT 8-19 no bleed No neurology follow up LINQ 6-19 PAF seen 11-19   Last Assessment & Plan:  Good recovery CVA- recent LINQ trasmission showed PAF: Would like to start Eliquis but because had CNS bleed needs neurology evaluation make sure ok. No change meds.     NICOLE (acute kidney injury) ([address]) 04/09/2020    Colon cancer [facility])     age 64's    colon resection     chemo pills    Diabetes mellitus ([address])     Fall     fell backwards from riding -  in ER-ok- incidental finding CXR   R lobe 2 masses-bx today    12/21/2021    Full dentures     Hyperlipidemia     Hypertension     Lung mass     R side x2,bx today 12/21/2021    Seasonal allergies     Stroke ([address])     6/2019     speech loss     minimal residual    Vitreous hemorrhage of right eye ([address]) 07/19/2016    R artificial eye    Walker as ambulation aid     Wears glasses        Past Surgical History:   Procedure Laterality Date    CATARACT EXTRACTION      COLECTOMY      COLONOSCOPY      EYE SURGERY      R eye removed for hemorrhage    TX EXC PRTD HAYDEN/PRTD GLND TOT DSJ&PRSRV FACIAL NR Left 10/6/2023    Procedure: TOTAL PAROTIDECTOMY W/ NIMS W/ SIGNIFICANT AMOUNT OF SKIN WILL BE REMOVED & SKIN FLAP OR PRIMARY CLOSURE;  Surgeon: Asia Kaur DO;  Location: [facility];  Service: ENT    SEPTOPLASTY         Family History   Family history unknown: Yes       Social History     Tobacco Use    Smoking status: Former     Packs/day: 1.00     Years: 40.00     Total pack years: 40.00     Types: Cigarettes    Smokeless tobacco: Never    Tobacco comments:     quit 30 years ago (as of 12/2018)   Vaping Use    Vaping Use: Never used   Substance Use Topics    Alcohol use:  Yes     Alcohol/week: 1.0 standard drink of alcohol     Types: 1 Cans of beer per week     Comment: 4 x weekly    Drug use: No          Current Outpatient Medications:     acetaminophen (TYLENOL) 325 mg tablet, every 6 (six) hours, Disp: , Rfl:     atorvastatin (LIPITOR) 40 mg tablet, Take 1 tablet (40 mg total) by mouth daily at bedtime, Disp: 90 tablet, Rfl: 3    Brinzolamide-Brimonidine (Simbrinza) 1-0.2 % SUSP, Apply to eye every 8 (eight) hours, Disp: , Rfl:     Cholecalciferol (Vitamin D-3) 25 MCG (1000 UT) CAPS, Take by mouth, Disp: , Rfl:     Eliquis 5 MG, TAKE 1 TABLET BY MOUTH  TWICE DAILY, Disp: 180 tablet, Rfl: 3    famotidine (PEPCID) 10 mg tablet, Take 10 mg by mouth if needed for heartburn, Disp: , Rfl:     glucose blood (Wilmer Contour Test) test strip, Test Once Daily, Disp: 100 each, Rfl: 1    guaiFENesin (MUCINEX) 600 mg 12 hr tablet, Take 1,200 mg by mouth every 12 (twelve) hours, Disp: , Rfl:     Lancets MISC, To test BS once daily. , Disp: 100 each, Rfl: 3    lisinopril (ZESTRIL) 20 mg tablet, Take 1 tablet (20 mg total) by mouth daily, Disp: 90 tablet, Rfl: 3    Lumigan 0.01 % ophthalmic drops, Administer into the left eye  , Disp: , Rfl:     metFORMIN (GLUCOPHAGE) 500 mg tablet, Take 1 tablet (500 mg total) by mouth daily with breakfast, Disp: 90 tablet, Rfl: 3    metoprolol succinate (TOPROL-XL) 100 mg 24 hr tablet, Take 1 tablet (100 mg total) by mouth daily, Disp: 90 tablet, Rfl: 3    nitroglycerin (NITROSTAT) 0.4 mg SL tablet, Place 0.4 mg under the tongue as needed, Disp: , Rfl:     oxyCODONE-acetaminophen (PERCOCET) 5-325 mg per tablet, Take 1 tablet by mouth every 6 (six) hours as needed for moderate pain Max Daily Amount: 4 tablets, Disp: 20 tablet, Rfl: 0    tiZANidine (ZANAFLEX) 2 mg tablet, Take 1 tablet (2 mg total) by mouth 2 (two) times a day as needed for muscle spasms, Disp: 20 tablet, Rfl: 0    vitamin B-12 (VITAMIN B-12) 1,000 mcg tablet, Take 1,000 mcg by mouth daily, Disp: , Rfl:     No Known Allergies     There were no vitals filed for this visit.

## 2023-10-25 ENCOUNTER — TELEPHONE (OUTPATIENT)
Dept: NUTRITION | Facility: CLINIC | Age: 88
End: 2023-10-25

## 2023-10-25 NOTE — TELEPHONE ENCOUNTER
Received notification by Candida RN, Louie Romero., on 10/24/23 that pt has triggered for oncology nutrition care (reason for referral: HNC dx and Malnutrition Screening Tool (MST) Triggers: scored a 0 indicating No recent wt loss and is not eating poorly due to a decreased appetite. (Date of MST: 10/24/23)). Contacted Joyce Vazquez and introduced self and explained the reason for today's call. He then put his wife, Ingrid Hoffman, on the phone. Discussed oncology nutrition services available (options for in-person and phone consultation) and the benefits of meeting for a consultation. Ingrid Hoffman states that Joyce Vazquez still hasn't decided if he wants to undergo treatment at this time. Provided this RD’s contact information asking that Joyce Vazquez or Ingrid Hoffman reach out prn, and if they would like to schedule an appt with RD if Joyce Vazquez plans to start treatment. All questions/concerns addressed at this time.

## 2023-11-03 PROCEDURE — 77263 THER RADIOLOGY TX PLNG CPLX: CPT | Performed by: RADIOLOGY

## 2023-11-06 ENCOUNTER — APPOINTMENT (OUTPATIENT)
Dept: RADIATION ONCOLOGY | Facility: CLINIC | Age: 88
End: 2023-11-06
Payer: MEDICARE

## 2023-11-06 PROCEDURE — 77334 RADIATION TREATMENT AID(S): CPT | Performed by: RADIOLOGY

## 2023-11-20 ENCOUNTER — APPOINTMENT (OUTPATIENT)
Dept: RADIATION ONCOLOGY | Facility: CLINIC | Age: 88
End: 2023-11-20
Payer: MEDICARE

## 2023-11-20 ENCOUNTER — VBI (OUTPATIENT)
Dept: ADMINISTRATIVE | Facility: OTHER | Age: 88
End: 2023-11-20

## 2023-11-21 ENCOUNTER — APPOINTMENT (OUTPATIENT)
Dept: RADIATION ONCOLOGY | Facility: CLINIC | Age: 88
End: 2023-11-21
Payer: MEDICARE

## 2023-11-22 ENCOUNTER — OFFICE VISIT (OUTPATIENT)
Dept: URGENT CARE | Facility: CLINIC | Age: 88
End: 2023-11-22
Payer: MEDICARE

## 2023-11-22 VITALS
DIASTOLIC BLOOD PRESSURE: 71 MMHG | RESPIRATION RATE: 18 BRPM | SYSTOLIC BLOOD PRESSURE: 133 MMHG | TEMPERATURE: 97.1 F | HEART RATE: 96 BPM | OXYGEN SATURATION: 98 %

## 2023-11-22 DIAGNOSIS — R39.198 DIFFICULTY URINATING: Primary | ICD-10-CM

## 2023-11-22 DIAGNOSIS — R31.9 URINARY TRACT INFECTION WITH HEMATURIA, SITE UNSPECIFIED: ICD-10-CM

## 2023-11-22 DIAGNOSIS — N39.0 URINARY TRACT INFECTION WITH HEMATURIA, SITE UNSPECIFIED: ICD-10-CM

## 2023-11-22 LAB
SL AMB  POCT GLUCOSE, UA: NEGATIVE
SL AMB LEUKOCYTE ESTERASE,UA: ABNORMAL
SL AMB POCT BILIRUBIN,UA: NEGATIVE
SL AMB POCT BLOOD,UA: ABNORMAL
SL AMB POCT CLARITY,UA: ABNORMAL
SL AMB POCT COLOR,UA: YELLOW
SL AMB POCT KETONES,UA: NEGATIVE
SL AMB POCT NITRITE,UA: POSITIVE
SL AMB POCT PH,UA: 7
SL AMB POCT SPECIFIC GRAVITY,UA: 1.01
SL AMB POCT URINE PROTEIN: 30
SL AMB POCT UROBILINOGEN: 0.2

## 2023-11-22 PROCEDURE — 87086 URINE CULTURE/COLONY COUNT: CPT | Performed by: PHYSICIAN ASSISTANT

## 2023-11-22 PROCEDURE — 87186 SC STD MICRODIL/AGAR DIL: CPT | Performed by: PHYSICIAN ASSISTANT

## 2023-11-22 PROCEDURE — 99213 OFFICE O/P EST LOW 20 MIN: CPT | Performed by: PHYSICIAN ASSISTANT

## 2023-11-22 PROCEDURE — 87077 CULTURE AEROBIC IDENTIFY: CPT | Performed by: PHYSICIAN ASSISTANT

## 2023-11-22 PROCEDURE — 81002 URINALYSIS NONAUTO W/O SCOPE: CPT | Performed by: PHYSICIAN ASSISTANT

## 2023-11-22 PROCEDURE — G0463 HOSPITAL OUTPT CLINIC VISIT: HCPCS | Performed by: PHYSICIAN ASSISTANT

## 2023-11-22 RX ORDER — CEPHALEXIN 500 MG/1
500 CAPSULE ORAL EVERY 12 HOURS SCHEDULED
Qty: 10 CAPSULE | Refills: 0 | Status: SHIPPED | OUTPATIENT
Start: 2023-11-22 | End: 2023-11-27

## 2023-11-22 NOTE — PROGRESS NOTES
=[facility]    NAME: Kayla Baez is a 81 y.o. male  : 1935    MRN: [id number]  DATE: 2023  TIME: 9:40 AM    Assessment and Plan   Difficulty urinating [R39.198]  1. Difficulty urinating  Urine culture    POCT urine dip      2. Urinary tract infection with hematuria, site unspecified  cephalexin (KEFLEX) 500 mg capsule          Patient Instructions     Patient Instructions   Your urine does show evidence of infection and this may be causing your urinary tract symptoms. Take antibiotic as instructed. Plan follow-up with your primary care in the next 2 to 3 weeks for recheck of your urine. You may want to call their office sooner rather than later to schedule an appointment. If completely unable to void, unusual weakness, abdominal pain proceed to emergency room for further evaluation. Chief Complaint     Chief Complaint   Patient presents with    Difficulty Urinating     Patient states that he has the urge to void, but unable to. Then states that when he lays down in bed he pees all over       History of Present Illness   Kayla Baez presents to the clinic c/o  60-year-old male comes in with wife for decreased ability to urinate and some incontinence episodes that started recently. No acute fever, chills, back pain, abdominal pain. No hematuria. No nausea or vomiting. States he has not had problems like this before. Review of Systems   Review of Systems   Constitutional:  Negative for activity change, appetite change, chills and fever. Respiratory:  Negative for chest tightness and shortness of breath. Cardiovascular:  Negative for chest pain. Gastrointestinal:  Negative for abdominal pain, nausea and vomiting. Genitourinary:  Positive for difficulty urinating, dysuria and urgency. Negative for flank pain, frequency, hematuria, penile pain, penile swelling and testicular pain.        Current Medications     Long-Term Medications   Medication Sig Dispense Refill    atorvastatin (LIPITOR) 40 mg tablet Take 1 tablet (40 mg total) by mouth daily at bedtime 90 tablet 3    Cholecalciferol (Vitamin D-3) 25 MCG (1000 UT) CAPS Take by mouth      Eliquis 5 MG TAKE 1 TABLET BY MOUTH  TWICE DAILY 180 tablet 3    famotidine (PEPCID) 10 mg tablet Take 10 mg by mouth if needed for heartburn      Lancets MISC To test BS once daily. 100 each 3    lisinopril (ZESTRIL) 20 mg tablet Take 1 tablet (20 mg total) by mouth daily 90 tablet 3    metFORMIN (GLUCOPHAGE) 500 mg tablet Take 1 tablet (500 mg total) by mouth daily with breakfast 90 tablet 3    metoprolol succinate (TOPROL-XL) 100 mg 24 hr tablet Take 1 tablet (100 mg total) by mouth daily 90 tablet 3    nitroglycerin (NITROSTAT) 0.4 mg SL tablet Place 0.4 mg under the tongue as needed (Patient not taking: Reported on 10/24/2023)      tiZANidine (ZANAFLEX) 2 mg tablet Take 1 tablet (2 mg total) by mouth 2 (two) times a day as needed for muscle spasms 20 tablet 0       Current Allergies     Allergies as of 11/22/2023    (No Known Allergies)          The following portions of the patient's history were reviewed and updated as appropriate: allergies, current medications, past family history, past medical history, past social history, past surgical history and problem list.  Past Medical History:   Diagnosis Date    Acute ischemic left MCA stroke ([address]) 06/21/2019    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 MRI small bleed CT 8-19 no bleed No neurology follow up LINQ 6-19 PAF seen 11-19   Last Assessment & Plan:  Good recovery CVA- recent LINQ trasmission showed PAF: Would like to start Eliquis but because had CNS bleed needs neurology evaluation make sure ok. No change meds.     NICOLE (acute kidney injury) ([address]) 04/09/2020    Colon cancer [facility])     age 64's    colon resection     chemo pills    Diabetes mellitus ([address])     Fall     fell backwards from riding -  in ER-ok- incidental finding CXR   R lobe 2 masses-bx today    12/21/2021    Full dentures     Hyperlipidemia     Hypertension     Lung mass     R side x2,bx today 12/21/2021    Seasonal allergies     Stroke ([address])     6/2019     speech loss     minimal residual    Vitreous hemorrhage of right eye ([address]) 07/19/2016    R artificial eye    Walker as ambulation aid     Wears glasses      Past Surgical History:   Procedure Laterality Date    CATARACT EXTRACTION      COLECTOMY      COLONOSCOPY      EYE SURGERY      R eye removed for hemorrhage    OK EXC PRTD HAYDEN/PRTD GLND TOT DSJ&PRSRV FACIAL NR Left 10/6/2023    Procedure: TOTAL PAROTIDECTOMY W/ NIMS W/ SIGNIFICANT AMOUNT OF SKIN WILL BE REMOVED & SKIN FLAP OR PRIMARY CLOSURE;  Surgeon: Matthew Meyer DO;  Location: [facility];  Service: ENT    SEPTOPLASTY       Family History   Family history unknown: Yes       Objective   /71   Pulse 96   Temp (!) 97.1 °F (36.2 °C) (Tympanic)   Resp 18   SpO2 98%   No LMP for male patient. Physical Exam     Physical Exam  Vitals and nursing note reviewed. Constitutional:       General: He is not in acute distress. Appearance: He is well-developed. He is not ill-appearing, toxic-appearing or diaphoretic. Comments: Elderly male in no acute distress. Accompanied by wife. Cardiovascular:      Rate and Rhythm: Normal rate and regular rhythm. Heart sounds: Normal heart sounds. No murmur heard. No friction rub. No gallop. Pulmonary:      Effort: Pulmonary effort is normal.      Breath sounds: Normal breath sounds. Abdominal:      Palpations: Abdomen is soft. Tenderness: There is no abdominal tenderness. There is no right CVA tenderness or left CVA tenderness. Skin:     General: Skin is warm and dry. Neurological:      Mental Status: He is alert and oriented to person, place, and time.    Psychiatric:         Mood and Affect: Mood normal.         Behavior: Behavior normal.

## 2023-11-24 LAB
BACTERIA UR CULT: ABNORMAL
BACTERIA UR CULT: ABNORMAL

## 2023-12-05 LAB — HBA1C MFR BLD HPLC: 6 %

## 2023-12-08 ENCOUNTER — RA CDI HCC (OUTPATIENT)
Dept: OTHER | Facility: HOSPITAL | Age: 88
End: 2023-12-08

## 2023-12-08 NOTE — PROGRESS NOTES
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

## 2023-12-12 ENCOUNTER — APPOINTMENT (OUTPATIENT)
Dept: RADIATION ONCOLOGY | Facility: CLINIC | Age: 88
End: 2023-12-12
Payer: MEDICARE

## 2023-12-12 PROCEDURE — 77301 RADIOTHERAPY DOSE PLAN IMRT: CPT | Performed by: RADIOLOGY

## 2023-12-12 PROCEDURE — 77300 RADIATION THERAPY DOSE PLAN: CPT | Performed by: RADIOLOGY

## 2023-12-12 PROCEDURE — 77338 DESIGN MLC DEVICE FOR IMRT: CPT | Performed by: RADIOLOGY

## 2023-12-13 ENCOUNTER — OFFICE VISIT (OUTPATIENT)
Dept: FAMILY MEDICINE CLINIC | Facility: CLINIC | Age: 88
End: 2023-12-13
Payer: MEDICARE

## 2023-12-13 VITALS
RESPIRATION RATE: 18 BRPM | HEART RATE: 60 BPM | SYSTOLIC BLOOD PRESSURE: 124 MMHG | HEIGHT: 65 IN | WEIGHT: 138 LBS | TEMPERATURE: 98.4 F | OXYGEN SATURATION: 97 % | BODY MASS INDEX: 22.99 KG/M2 | DIASTOLIC BLOOD PRESSURE: 78 MMHG

## 2023-12-13 DIAGNOSIS — I50.22 CHRONIC SYSTOLIC HEART FAILURE (HCC): ICD-10-CM

## 2023-12-13 DIAGNOSIS — Z00.00 MEDICARE ANNUAL WELLNESS VISIT, SUBSEQUENT: Primary | ICD-10-CM

## 2023-12-13 DIAGNOSIS — I48.0 PAROXYSMAL ATRIAL FIBRILLATION (HCC): ICD-10-CM

## 2023-12-13 DIAGNOSIS — S32.029D CLOSED FRACTURE OF SECOND LUMBAR VERTEBRA WITH ROUTINE HEALING, UNSPECIFIED FRACTURE MORPHOLOGY, SUBSEQUENT ENCOUNTER: ICD-10-CM

## 2023-12-13 DIAGNOSIS — I25.10 CORONARY ARTERY DISEASE INVOLVING NATIVE CORONARY ARTERY OF NATIVE HEART WITHOUT ANGINA PECTORIS: ICD-10-CM

## 2023-12-13 DIAGNOSIS — N18.31 STAGE 3A CHRONIC KIDNEY DISEASE (HCC): ICD-10-CM

## 2023-12-13 DIAGNOSIS — N40.1 BENIGN PROSTATIC HYPERPLASIA WITH URINARY HESITANCY: ICD-10-CM

## 2023-12-13 DIAGNOSIS — I71.40 ABDOMINAL AORTIC ANEURYSM (AAA) WITHOUT RUPTURE, UNSPECIFIED PART (HCC): ICD-10-CM

## 2023-12-13 DIAGNOSIS — C07 CANCER OF PAROTID GLAND (HCC): ICD-10-CM

## 2023-12-13 DIAGNOSIS — E11.8 TYPE 2 DIABETES MELLITUS WITH COMPLICATION, WITHOUT LONG-TERM CURRENT USE OF INSULIN (HCC): ICD-10-CM

## 2023-12-13 DIAGNOSIS — R39.11 BENIGN PROSTATIC HYPERPLASIA WITH URINARY HESITANCY: ICD-10-CM

## 2023-12-13 DIAGNOSIS — D68.9 COAGULATION DEFECT, UNSPECIFIED (HCC): ICD-10-CM

## 2023-12-13 DIAGNOSIS — E78.2 MIXED HYPERLIPIDEMIA: ICD-10-CM

## 2023-12-13 PROCEDURE — G0439 PPPS, SUBSEQ VISIT: HCPCS | Performed by: FAMILY MEDICINE

## 2023-12-13 PROCEDURE — G0444 DEPRESSION SCREEN ANNUAL: HCPCS | Performed by: FAMILY MEDICINE

## 2023-12-13 PROCEDURE — 77427 RADIATION TX MANAGEMENT X5: CPT | Performed by: RADIOLOGY

## 2023-12-13 PROCEDURE — 77386 HB NTSTY MODUL RAD TX DLVR CPLX: CPT | Performed by: INTERNAL MEDICINE

## 2023-12-13 PROCEDURE — 99214 OFFICE O/P EST MOD 30 MIN: CPT | Performed by: FAMILY MEDICINE

## 2023-12-13 PROCEDURE — 77014 CHG CT GUIDANCE RADIATION THERAPY FLDS PLACEMENT: CPT | Performed by: INTERNAL MEDICINE

## 2023-12-13 RX ORDER — TAMSULOSIN HYDROCHLORIDE 0.4 MG/1
0.4 CAPSULE ORAL
Qty: 30 CAPSULE | Refills: 2 | Status: SHIPPED | OUTPATIENT
Start: 2023-12-13

## 2023-12-13 NOTE — ASSESSMENT & PLAN NOTE
Lab Results   Component Value Date    HGBA1C 6.0 (H) 12/05/2023   Hemoglobin A1c stable at 6.0.   Continue metformin 500 mg daily

## 2023-12-13 NOTE — PROGRESS NOTES
Assessment and Plan:     Problem List Items Addressed This Visit     Type 2 diabetes mellitus with complication, without long-term current use of insulin ([address])       Lab Results   Component Value Date    HGBA1C 6.0 (H) 12/05/2023   Hemoglobin A1c stable at 6.0. Continue metformin 500 mg daily         Relevant Orders    Hemoglobin A1C    Stage 3a chronic kidney disease (HCC)    Paroxysmal atrial fibrillation (HCC)     Continue metoprolol for rate control and Eliquis for anticoagulation         Medicare annual wellness visit, subsequent - Primary    Coronary artery disease involving native coronary artery     Asymptomatic. Continue to monitor risk factors         Coagulation defect, unspecified ([address])    Closed fracture of second lumbar vertebra ([address])    Chronic systolic heart failure (HCC)     Wt Readings from Last 3 Encounters:   12/13/23 62.6 kg (138 lb)   11/21/23 62.6 kg (138 lb)   10/24/23 66.5 kg (146 lb 8 oz)         Patient euvolemic on exam.  Continue current medication and follow-up with cardiology as needed. 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 Radiation therapy to start tomorrow. Abdominal aortic aneurysm (AAA) without rupture (HCC)     Stable. No progression noted on serial CT scans        Other Visit Diagnoses     Benign prostatic hyperplasia with urinary hesitancy        Relevant Medications    tamsulosin (FLOMAX) 0.4 mg    Mixed hyperlipidemia        Relevant Orders    Comprehensive metabolic panel    Lipid Panel with Direct LDL reflex    TSH, 3rd generation          Depression Screening and Follow-up Plan: Patient was screened for depression during today's encounter. They screened negative with a PHQ-2 score of 0. Preventive health issues were discussed with patient, and age appropriate screening tests were ordered as noted in patient's After Visit Summary.   Personalized health advice and appropriate referrals for health education or preventive services given if needed, as noted in patient's After Visit Summary. History of Present Illness:     Patient presents for a Medicare Wellness Visit    Patient was seen for annual wellness visit and for follow-up of chronic medical problems. He is being treated for type 2 diabetes, hypertension, atrial fibrillation, chronic systolic heart failure, history of stroke. Recent diagnosis of cancer of the parotid gland on the left side with surgical excision. He is about to start radiation therapy for the next 6 weeks. Patient Care Team:  Emmanuel Ramirez DO as PCP - General (Family Medicine)  Burton Shah MD  [facility] Hood Mojica DO (Otolaryngology)  Anibal Ferrera MD (Radiation Oncology)  Nataly Tello (Nutrition)     Review of Systems:     Review of Systems   Constitutional: Negative. Respiratory: Negative. Cardiovascular: Negative. Gastrointestinal: Negative. Genitourinary: Negative. Musculoskeletal: Negative. Psychiatric/Behavioral: Negative.           Problem List:     Patient Active Problem List   Diagnosis   • Benign essential hypertension   • History of malignant neoplasm of left colon   • Chronic low back pain   • Elevated CEA   • Elevated transaminase level   • Glaucoma   • Positive depression screening   • Type 2 diabetes mellitus with complication, without long-term current use of insulin (HCC)   • Hypertriglyceridemia   • Chronic arterial ischemic stroke   • Chronic systolic heart failure (HCC)   • Coronary artery disease involving native coronary artery   • Paroxysmal atrial fibrillation (HCC)   • Physical deconditioning   • Status post placement of implantable loop recorder   • Injury of right foot   • Foot pain, right   • Abdominal aortic aneurysm (AAA) without rupture ([address])   • Lung mass   • Walker as ambulation aid   • Stage 3a chronic kidney disease ([address])   • Closed fracture of second lumbar vertebra (HCC)   • Chronic idiopathic constipation   • Coagulation defect, unspecified ([address])   • Pre-op examination   • Cancer of left parotid gland ([address])   • Medicare annual wellness visit, subsequent      Past Medical and Surgical History:     Past Medical History:   Diagnosis Date   • Acute ischemic left MCA stroke ([address]) 06/21/2019    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 MRI small bleed CT 8-19 no bleed No neurology follow up LINQ 6-19 PAF seen 11-19   Last Assessment & Plan:  Good recovery CVA- recent LINQ trasmission showed PAF: Would like to start Eliquis but because had CNS bleed needs neurology evaluation make sure ok. No change meds.    • NICOLE (acute kidney injury) ([address]) 04/09/2020   • Colon cancer [facility])     age 64's    colon resection     chemo pills   • Diabetes mellitus ([address])    • Fall     fell backwards from riding -  in ER-ok- incidental finding CXR   R lobe 2 masses-bx today    12/21/2021   • Full dentures    • Hyperlipidemia    • Hypertension    • Lung mass     R side x2,bx today 12/21/2021   • Seasonal allergies    • Stroke ([address])     6/2019     speech loss     minimal residual   • Vitreous hemorrhage of right eye ([address]) 07/19/2016    R artificial eye   • Walker as ambulation aid    • Wears glasses      Past Surgical History:   Procedure Laterality Date   • CATARACT EXTRACTION     • COLECTOMY     • COLONOSCOPY     • EYE SURGERY      R eye removed for hemorrhage   • NJ EXC PRTD HAYDEN/PRTD GLND TOT DSJ&PRSRV FACIAL NR Left 10/6/2023    Procedure: TOTAL PAROTIDECTOMY W/ NIMS W/ SIGNIFICANT AMOUNT OF SKIN WILL BE REMOVED & SKIN FLAP OR PRIMARY CLOSURE;  Surgeon: Cindy Potter DO;  Location: [facility];  Service: ENT   • SEPTOPLASTY        Family History:     Family History   Family history unknown: Yes      Social History:     Social History     Socioeconomic History   • Marital status: /Civil Union     Spouse name: None   • Number of children: None   • Years of education: None   • Highest education level: None   Occupational History   • None   Tobacco Use   • Smoking status: Former     Current packs/day: 0.00     Average packs/day: 1 pack/day for 40.0 years (40.0 ttl pk-yrs)     Types: Cigarettes     Start date: 37     Quit date:      Years since quittin.9   • Smokeless tobacco: Never   • Tobacco comments:     quit 30 years ago (as of 2018)   Vaping Use   • Vaping status: Never Used   Substance and Sexual Activity   • Alcohol use: Yes     Alcohol/week: 1.0 standard drink of alcohol     Types: 1 Cans of beer per week     Comment: was drinking 1 beer daily   • Drug use: Never   • Sexual activity: Not Currently   Other Topics Concern   • None   Social History Narrative   • None     Social Determinants of Health     Financial Resource Strain: Low Risk  (2023)    Overall Financial Resource Strain (CARDIA)    • Difficulty of Paying Living Expenses: Not hard at all   Food Insecurity: No Food Insecurity (9/3/2023)    Received from [address]    • Worried About [place] in the Last Year: Never true    • Ran Out of Food in the Last Year: Never true   Transportation Needs: No Transportation Needs (2023)    PRAPARE - Transportation    • Lack of Transportation (Medical): No    • Lack of Transportation (Non-Medical):  No   Physical Activity: Not on file   Stress: Not on file   Social Connections: Not on file   Intimate Partner Violence: Not At Risk (9/3/2023)    Received from [address], Afraid, Rape, and Kick questionnaire    • Fear of Current or Ex-Partner: No    • Emotionally Abused: No    • Physically Abused: No    • Sexually Abused: No   Housing Stability: Low Risk  (9/3/2023)    Received from [address] [place] [address] Sign    • Unable to Pay for Housing in the Last Year: No    • Number of Places Lived in the Last Year: 1    • Unstable Housing in the Last Year: No      Medications and Allergies:     Current Outpatient Medications   Medication Sig Dispense Refill   • acetaminophen (TYLENOL) 325 mg tablet every 6 (six) hours     • atorvastatin (LIPITOR) 40 mg tablet Take 1 tablet (40 mg total) by mouth daily at bedtime 90 tablet 3   • Brinzolamide-Brimonidine (Simbrinza) 1-0.2 % SUSP Apply to eye every 8 (eight) hours     • Cholecalciferol (Vitamin D-3) 25 MCG (1000 UT) CAPS Take by mouth     • Eliquis 5 MG TAKE 1 TABLET BY MOUTH  TWICE DAILY 180 tablet 3   • famotidine (PEPCID) 10 mg tablet Take 10 mg by mouth if needed for heartburn     • glucose blood (Wilmer Contour Test) test strip Test Once Daily 100 each 1   • guaiFENesin (MUCINEX) 600 mg 12 hr tablet Take 1,200 mg by mouth every 12 (twelve) hours     • Lancets MISC To test BS once daily.  100 each 3   • lisinopril (ZESTRIL) 20 mg tablet Take 1 tablet (20 mg total) by mouth daily 90 tablet 3   • Lumigan 0.01 % ophthalmic drops Administer into the left eye       • metFORMIN (GLUCOPHAGE) 500 mg tablet Take 1 tablet (500 mg total) by mouth daily with breakfast 90 tablet 3   • metoprolol succinate (TOPROL-XL) 100 mg 24 hr tablet Take 1 tablet (100 mg total) by mouth daily 90 tablet 3   • tamsulosin (FLOMAX) 0.4 mg Take 1 capsule (0.4 mg total) by mouth daily with dinner 30 capsule 2   • tiZANidine (ZANAFLEX) 2 mg tablet Take 1 tablet (2 mg total) by mouth 2 (two) times a day as needed for muscle spasms 20 tablet 0   • traMADol (ULTRAM) 50 mg tablet Take 50 mg by mouth every 6 (six) hours as needed for moderate pain     • vitamin B-12 (VITAMIN B-12) 1,000 mcg tablet Take 1,000 mcg by mouth daily     • nitroglycerin (NITROSTAT) 0.4 mg SL tablet Place 0.4 mg under the tongue as needed (Patient not taking: Reported on 10/24/2023)     • oxyCODONE-acetaminophen (PERCOCET) 5-325 mg per tablet Take 1 tablet by mouth every 6 (six) hours as needed for moderate pain Max Daily Amount: 4 tablets (Patient not taking: Reported on 10/24/2023) 20 tablet 0     No current facility-administered medications for this visit. No Known Allergies   Immunizations:     Immunization History   Administered Date(s) Administered   • COVID-19 MODERNA VACC 0.5 ML IM 01/28/2021, 02/25/2021, 10/27/2021, 07/15/2022   • INFLUENZA 09/09/2014, 10/02/2015, 10/04/2016, 11/01/2017, 10/05/2018, 11/07/2019, 08/28/2020, 10/03/2020, 10/14/2021   • Influenza Quadrivalent, 6-35 Months IM 11/01/2017   • Influenza Split High Dose Preservative Free IM 09/09/2014, 10/02/2015, 10/04/2016   • Influenza, high dose seasonal 0.7 mL 10/03/2020, 10/02/2023   • Pneumococcal 11/07/2019   • Pneumococcal Conjugate 13-Valent 07/21/2015   • TD (adult) Preservative Free 07/02/2015   • Tdap 11/02/2022   • Tetanus, adsorbed 07/02/2015   • Zoster 01/05/2013   • Zoster Vaccine Recombinant 05/20/2018   • influenza, trivalent, adjuvanted 09/26/2019      Health Maintenance: There are no preventive care reminders to display for this patient. Topic Date Due   • Pneumococcal Vaccine: 65+ Years (2 - PPSV23 or PCV20) 01/02/2020   • COVID-19 Vaccine (5 - 2023-24 season) 09/01/2023      Medicare Screening Tests and Risk Assessments:     Rosalio Em is here for his Subsequent Wellness visit. Health Risk Assessment:   Patient rates overall health as good. Patient feels that their physical health rating is same. Patient is satisfied with their life. Eyesight was rated as same. Hearing was rated as same. Patient feels that their emotional and mental health rating is same. Patients states they are sometimes angry. Patient states they are sometimes unusually tired/fatigued. Pain experienced in the last 7 days has been some. Patient's pain rating has been 8/10. Patient states that he has experienced no weight loss or gain in last 6 months. Depression Screening:   PHQ-2 Score: 0      Fall Risk Screening:    In the past year, patient has experienced: no history of falling in past year      Home Safety:  Patient has trouble with stairs inside or outside of their home. Patient has working smoke alarms and has working carbon monoxide detector. Home safety hazards include: none. Nutrition:   Current diet is Regular. Medications:   Patient is currently taking over-the-counter supplements. OTC medications include: see medication list. Patient is able to manage medications. Activities of Daily Living (ADLs)/Instrumental Activities of Daily Living (IADLs):   Walk and transfer into and out of bed and chair?: No  Dress and groom yourself?: No    Bathe or shower yourself?: No    Feed yourself? Yes  Do your laundry/housekeeping?: No  Manage your money, pay your bills and track your expenses?: No  Make your own meals?: No    Do your own shopping?: No    Previous Hospitalizations:   Any hospitalizations or ED visits within the last 12 months?: No      Advance Care Planning:   Living will: Yes    Durable POA for healthcare: Yes    Advanced directive: Yes      Cognitive Screening:   Provider or family/friend/caregiver concerned regarding cognition?: No    PREVENTIVE SCREENINGS      Cardiovascular Screening:    General: Screening Not Indicated and History Lipid Disorder      Diabetes Screening:     General: Screening Not Indicated and History Diabetes      Colorectal Cancer Screening:     General: Screening Not Indicated and History Colorectal Cancer      Prostate Cancer Screening:    General: Screening Not Indicated      Osteoporosis Screening:    General: Screening Not Indicated      Abdominal Aortic Aneurysm (AAA) Screening:    Risk factors include: tobacco use        General: Screening Not Indicated and History AAA      Lung Cancer Screening:     General: Screening Not Indicated      Hepatitis C Screening:    General: Screening Not Indicated    Screening, Brief Intervention, and Referral to Treatment (SBIRT)    Screening  Typical number of drinks in a day: 0  Typical number of drinks in a week: 0  Interpretation: Low risk drinking behavior.     Single Item Drug Screening:  How often have you used an illegal drug (including marijuana) or a prescription medication for non-medical reasons in the past year? never    Single Item Drug Screen Score: 0  Interpretation: Negative screen for possible drug use disorder    Brief Intervention  Alcohol & drug use screenings were reviewed. No concerns regarding substance use disorder identified. Annual Depression Screening  Time spent screening and evaluating the patient for depression during today's encounter was 5 minutes. No results found. Physical Exam:     /78 (BP Location: Left arm, Patient Position: Sitting, Cuff Size: Standard)   Pulse 60   Temp 98.4 °F (36.9 °C) (Temporal)   Resp 18   Ht 5' 5" (1.651 m)   Wt 62.6 kg (138 lb)   SpO2 97%   BMI 22.96 kg/m²     Physical Exam  Vitals and nursing note reviewed. Constitutional:       Appearance: Normal appearance. He is well-developed. HENT:      Head: Normocephalic. Right Ear: External ear normal.      Left Ear: External ear normal.      Nose: Nose normal.      Mouth/Throat:      Mouth: Mucous membranes are moist.   Eyes:      Conjunctiva/sclera: Conjunctivae normal.      Pupils: Pupils are equal, round, and reactive to light. Cardiovascular:      Rate and Rhythm: Normal rate and regular rhythm. Heart sounds: Normal heart sounds. Pulmonary:      Effort: Pulmonary effort is normal.      Breath sounds: Normal breath sounds. Abdominal:      General: Bowel sounds are normal.      Palpations: Abdomen is soft. Musculoskeletal:         General: Normal range of motion. Cervical back: Normal range of motion and neck supple. Skin:     General: Skin is warm and dry. Neurological:      Mental Status: He is alert. Psychiatric:         Mood and Affect: Mood normal.         Behavior: Behavior normal.         Thought Content:  Thought content normal.         Judgment: Judgment normal.          Tara Reyes DO

## 2023-12-13 NOTE — ASSESSMENT & PLAN NOTE
Blood pressure stable in office with metoprolol  and lisinopril 20 mg daily. Home monitoring somewhat variable but overall acceptable.

## 2023-12-13 NOTE — ASSESSMENT & PLAN NOTE
Wt Readings from Last 3 Encounters:   12/13/23 62.6 kg (138 lb)   11/21/23 62.6 kg (138 lb)   10/24/23 66.5 kg (146 lb 8 oz)         Patient euvolemic on exam.  Continue current medication and follow-up with cardiology as needed.

## 2023-12-13 NOTE — PATIENT INSTRUCTIONS
Medicare Preventive Visit Patient Instructions  Thank you for completing your Welcome to Medicare Visit or Medicare Annual Wellness Visit today. Your next wellness visit will be due in one year (12/13/2024). The screening/preventive services that you may require over the next 5-10 years are detailed below. Some tests may not apply to you based off risk factors and/or age. Screening tests ordered at today's visit but not completed yet may show as past due. Also, please note that scanned in results may not display below. Preventive Screenings:  Service Recommendations Previous Testing/Comments   Colorectal Cancer Screening  Colonoscopy    Fecal Occult Blood Test (FOBT)/Fecal Immunochemical Test (FIT)  Fecal DNA/Cologuard Test  Flexible Sigmoidoscopy Age: 43-73 years old   Colonoscopy: every 10 years (May be performed more frequently if at higher risk)  OR  FOBT/FIT: every 1 year  OR  Cologuard: every 3 years  OR  Sigmoidoscopy: every 5 years  Screening may be recommended earlier than age 39 if at higher risk for colorectal cancer. Also, an individualized decision between you and your healthcare provider will decide whether screening between the ages of 77-80 would be appropriate.  Colonoscopy: Not on file  FOBT/FIT: 12/26/2020  Cologuard: Not on file  Sigmoidoscopy: Not on file    Screening Not Indicated  History Colorectal Cancer     Prostate Cancer Screening Individualized decision between patient and health care provider in men between ages of 53-66   Medicare will cover every 12 months beginning on the day after your 50th birthday PSA: No results in last 5 years     Screening Not Indicated     Hepatitis C Screening Once for adults born between [birth year] and 1965  More frequently in patients at high risk for Hepatitis C Hep C Antibody: Not on file        Diabetes Screening 1-2 times per year if you're at risk for diabetes or have pre-diabetes Fasting glucose: No results in last 5 years (No results in last 5 years)  A1C: 6.0 % (12/5/2023)  Screening Not Indicated  History Diabetes   Cholesterol Screening Once every 5 years if you don't have a lipid disorder. May order more often based on risk factors. Lipid panel: 05/16/2022  Screening Not Indicated  History Lipid Disorder      Other Preventive Screenings Covered by Medicare:  Abdominal Aortic Aneurysm (AAA) Screening: covered once if your at risk. You're considered to be at risk if you have a family history of AAA or a male between the age of 70-76 who smoking at least 100 cigarettes in your lifetime. Lung Cancer Screening: covers low dose CT scan once per year if you meet all of the following conditions: (1) Age 48-67; (2) No signs or symptoms of lung cancer; (3) Current smoker or have quit smoking within the last 15 years; (4) You have a tobacco smoking history of at least 20 pack years (packs per day x number of years you smoked); (5) You get a written order from a healthcare provider. Glaucoma Screening: covered annually if you're considered high risk: (1) You have diabetes OR (2) Family history of glaucoma OR (3)  aged 48 and older OR (3)  American aged 72 and older  Osteoporosis Screening: covered every 2 years if you meet one of the following conditions: (1) Have a vertebral abnormality; (2) On glucocorticoid therapy for more than 3 months; (3) Have primary hyperparathyroidism; (4) On osteoporosis medications and need to assess response to drug therapy. HIV Screening: covered annually if you're between the age of 14-79. Also covered annually if you are younger than 13 and older than 72 with risk factors for HIV infection. For pregnant patients, it is covered up to 3 times per pregnancy.     Immunizations:  Immunization Recommendations   Influenza Vaccine Annual influenza vaccination during flu season is recommended for all persons aged >= 6 months who do not have contraindications   Pneumococcal Vaccine   * Pneumococcal conjugate vaccine = PCV13 (Prevnar 13), PCV15 (Vaxneuvance), PCV20 (Prevnar 20)  * Pneumococcal polysaccharide vaccine = PPSV23 (Pneumovax) Adults 28-91 yo with certain risk factors or if 69+ yo  If never received any pneumonia vaccine: recommend Prevnar 20 (PCV20)  Give PCV20 if previously received 1 dose of PCV13 or PPSV23   Hepatitis B Vaccine 3 dose series if at intermediate or high risk (ex: diabetes, end stage renal disease, liver disease)   Respiratory syncytial virus (RSV) Vaccine - COVERED BY MEDICARE PART D  * RSVPreF3 (Arexvy) CDC recommends that adults 61years of age and older may receive a single dose of RSV vaccine using shared clinical decision-making (SCDM)   Tetanus (Td) Vaccine - COST NOT COVERED BY MEDICARE PART B Following completion of primary series, a booster dose should be given every 10 years to maintain immunity against tetanus. Td may also be given as tetanus wound prophylaxis. Tdap Vaccine - COST NOT COVERED BY MEDICARE PART B Recommended at least once for all adults. For pregnant patients, recommended with each pregnancy. Shingles Vaccine (Shingrix) - COST NOT COVERED BY MEDICARE PART B  2 shot series recommended in those 19 years and older who have or will have weakened immune systems or those 50 years and older     Health Maintenance Due:  There are no preventive care reminders to display for this patient. Immunizations Due:      Topic Date Due   • Pneumococcal Vaccine: 65+ Years (2 - PPSV23 or PCV20) 01/02/2020   • COVID-19 Vaccine (5 - 2023-24 season) 09/01/2023     Advance Directives   What are advance directives? Advance directives are legal documents that state your wishes and plans for medical care. These plans are made ahead of time in case you lose your ability to make decisions for yourself. Advance directives can apply to any medical decision, such as the treatments you want, and if you want to donate organs. What are the types of advance directives?   There are many types of advance directives, and each state has rules about how to use them. You may choose a combination of any of the following:  Living will: This is a written record of the treatment you want. You can also choose which treatments you do not want, which to limit, and which to stop at a certain time. This includes surgery, medicine, IV fluid, and tube feedings. Durable power of  for healthcare [facility]): This is a written record that states who you want to make healthcare choices for you when you are unable to make them for yourself. This person, called a proxy, is usually a family member or a friend. You may choose more than 1 proxy. Do not resuscitate (DNR) order:  A DNR order is used in case your heart stops beating or you stop breathing. It is a request not to have certain forms of treatment, such as CPR. A DNR order may be included in other types of advance directives. Medical directive: This covers the care that you want if you are in a coma, near death, or unable to make decisions for yourself. You can list the treatments you want for each condition. Treatment may include pain medicine, surgery, blood transfusions, dialysis, IV or tube feedings, and a ventilator (breathing machine). Values history: This document has questions about your views, beliefs, and how you feel and think about life. This information can help others choose the care that you would choose. Why are advance directives important? An advance directive helps you control your care. Although spoken wishes may be used, it is better to have your wishes written down. Spoken wishes can be misunderstood, or not followed. Treatments may be given even if you do not want them. An advance directive may make it easier for your family to make difficult choices about your care. © Copyright [employer] 2018 Information is for End User's use only and may not be sold, redistributed or otherwise used for commercial purposes.  All illustrations and images included in CareNotes® are the copyrighted property of [email or web address] or [address] [place] [address]

## 2023-12-14 ENCOUNTER — APPOINTMENT (OUTPATIENT)
Dept: RADIATION ONCOLOGY | Facility: CLINIC | Age: 88
End: 2023-12-14
Attending: RADIOLOGY
Payer: MEDICARE

## 2023-12-14 PROCEDURE — 77386 HB NTSTY MODUL RAD TX DLVR CPLX: CPT | Performed by: RADIOLOGY

## 2023-12-15 ENCOUNTER — APPOINTMENT (OUTPATIENT)
Dept: RADIATION ONCOLOGY | Facility: CLINIC | Age: 88
End: 2023-12-15
Payer: MEDICARE

## 2023-12-15 PROCEDURE — 77014 CHG CT GUIDANCE RADIATION THERAPY FLDS PLACEMENT: CPT | Performed by: RADIOLOGY

## 2023-12-15 PROCEDURE — 77386 HB NTSTY MODUL RAD TX DLVR CPLX: CPT | Performed by: RADIOLOGY

## 2023-12-18 ENCOUNTER — APPOINTMENT (OUTPATIENT)
Dept: RADIATION ONCOLOGY | Facility: CLINIC | Age: 88
End: 2023-12-18
Payer: MEDICARE

## 2023-12-18 PROCEDURE — 77014 CHG CT GUIDANCE RADIATION THERAPY FLDS PLACEMENT: CPT | Performed by: RADIOLOGY

## 2023-12-18 PROCEDURE — 77386 HB NTSTY MODUL RAD TX DLVR CPLX: CPT | Performed by: RADIOLOGY

## 2023-12-19 ENCOUNTER — APPOINTMENT (OUTPATIENT)
Dept: RADIATION ONCOLOGY | Facility: CLINIC | Age: 88
End: 2023-12-19
Payer: MEDICARE

## 2023-12-19 PROCEDURE — 77386 HB NTSTY MODUL RAD TX DLVR CPLX: CPT | Performed by: RADIOLOGY

## 2023-12-19 PROCEDURE — 77336 RADIATION PHYSICS CONSULT: CPT | Performed by: RADIOLOGY

## 2023-12-19 PROCEDURE — 77014 CHG CT GUIDANCE RADIATION THERAPY FLDS PLACEMENT: CPT | Performed by: RADIOLOGY

## 2023-12-20 ENCOUNTER — APPOINTMENT (OUTPATIENT)
Dept: RADIATION ONCOLOGY | Facility: CLINIC | Age: 88
End: 2023-12-20
Payer: MEDICARE

## 2023-12-20 PROCEDURE — 77014 CHG CT GUIDANCE RADIATION THERAPY FLDS PLACEMENT: CPT | Performed by: INTERNAL MEDICINE

## 2023-12-20 PROCEDURE — 77427 RADIATION TX MANAGEMENT X5: CPT | Performed by: RADIOLOGY

## 2023-12-20 PROCEDURE — 77386 HB NTSTY MODUL RAD TX DLVR CPLX: CPT | Performed by: INTERNAL MEDICINE

## 2023-12-21 ENCOUNTER — APPOINTMENT (OUTPATIENT)
Dept: RADIATION ONCOLOGY | Facility: CLINIC | Age: 88
End: 2023-12-21
Payer: MEDICARE

## 2023-12-21 PROCEDURE — 77386 HB NTSTY MODUL RAD TX DLVR CPLX: CPT | Performed by: RADIOLOGY

## 2023-12-21 PROCEDURE — 77014 CHG CT GUIDANCE RADIATION THERAPY FLDS PLACEMENT: CPT | Performed by: RADIOLOGY

## 2023-12-22 ENCOUNTER — APPOINTMENT (OUTPATIENT)
Dept: RADIATION ONCOLOGY | Facility: CLINIC | Age: 88
End: 2023-12-22
Payer: MEDICARE

## 2023-12-22 PROCEDURE — 77014 CHG CT GUIDANCE RADIATION THERAPY FLDS PLACEMENT: CPT | Performed by: RADIOLOGY

## 2023-12-22 PROCEDURE — 77386 HB NTSTY MODUL RAD TX DLVR CPLX: CPT | Performed by: RADIOLOGY

## 2023-12-26 ENCOUNTER — APPOINTMENT (OUTPATIENT)
Dept: RADIATION ONCOLOGY | Facility: CLINIC | Age: 88
End: 2023-12-26
Payer: MEDICARE

## 2023-12-26 PROCEDURE — 77386 HB NTSTY MODUL RAD TX DLVR CPLX: CPT | Performed by: RADIOLOGY

## 2023-12-26 PROCEDURE — 77014 CHG CT GUIDANCE RADIATION THERAPY FLDS PLACEMENT: CPT | Performed by: RADIOLOGY

## 2023-12-27 ENCOUNTER — APPOINTMENT (OUTPATIENT)
Dept: RADIATION ONCOLOGY | Facility: CLINIC | Age: 88
End: 2023-12-27
Payer: MEDICARE

## 2023-12-27 PROCEDURE — 77014 CHG CT GUIDANCE RADIATION THERAPY FLDS PLACEMENT: CPT | Performed by: INTERNAL MEDICINE

## 2023-12-27 PROCEDURE — 77386 HB NTSTY MODUL RAD TX DLVR CPLX: CPT | Performed by: INTERNAL MEDICINE

## 2023-12-27 PROCEDURE — 77336 RADIATION PHYSICS CONSULT: CPT | Performed by: RADIOLOGY

## 2023-12-28 ENCOUNTER — APPOINTMENT (OUTPATIENT)
Dept: RADIATION ONCOLOGY | Facility: CLINIC | Age: 88
End: 2023-12-28
Payer: MEDICARE

## 2023-12-28 PROCEDURE — 77014 CHG CT GUIDANCE RADIATION THERAPY FLDS PLACEMENT: CPT | Performed by: RADIOLOGY

## 2023-12-28 PROCEDURE — 77427 RADIATION TX MANAGEMENT X5: CPT | Performed by: INTERNAL MEDICINE

## 2023-12-28 PROCEDURE — 77386 HB NTSTY MODUL RAD TX DLVR CPLX: CPT | Performed by: RADIOLOGY

## 2023-12-29 ENCOUNTER — APPOINTMENT (OUTPATIENT)
Dept: RADIATION ONCOLOGY | Facility: CLINIC | Age: 88
End: 2023-12-29
Attending: RADIOLOGY
Payer: MEDICARE

## 2023-12-29 PROCEDURE — 77014 CHG CT GUIDANCE RADIATION THERAPY FLDS PLACEMENT: CPT | Performed by: INTERNAL MEDICINE

## 2023-12-29 PROCEDURE — 77386 HB NTSTY MODUL RAD TX DLVR CPLX: CPT | Performed by: INTERNAL MEDICINE

## 2024-01-01 ENCOUNTER — TELEPHONE (OUTPATIENT)
Age: 89
End: 2024-01-01

## 2024-01-02 ENCOUNTER — APPOINTMENT (OUTPATIENT)
Dept: RADIATION ONCOLOGY | Facility: CLINIC | Age: 89
End: 2024-01-02
Payer: MEDICARE

## 2024-01-02 PROCEDURE — 77014 CHG CT GUIDANCE RADIATION THERAPY FLDS PLACEMENT: CPT | Performed by: RADIOLOGY

## 2024-01-02 PROCEDURE — 77386 HB NTSTY MODUL RAD TX DLVR CPLX: CPT | Performed by: RADIOLOGY

## 2024-01-03 ENCOUNTER — APPOINTMENT (OUTPATIENT)
Dept: RADIATION ONCOLOGY | Facility: CLINIC | Age: 89
End: 2024-01-03
Payer: MEDICARE

## 2024-01-03 DIAGNOSIS — C07 CANCER OF PAROTID GLAND (HCC): Primary | ICD-10-CM

## 2024-01-03 PROCEDURE — 77014 CHG CT GUIDANCE RADIATION THERAPY FLDS PLACEMENT: CPT | Performed by: INTERNAL MEDICINE

## 2024-01-03 PROCEDURE — 77386 HB NTSTY MODUL RAD TX DLVR CPLX: CPT | Performed by: INTERNAL MEDICINE

## 2024-01-03 RX ORDER — DIPHENHYDRAMINE HYDROCHLORIDE AND LIDOCAINE HYDROCHLORIDE AND ALUMINUM HYDROXIDE AND MAGNESIUM HYDRO
10 KIT EVERY 4 HOURS PRN
Qty: 480 ML | Refills: 1 | Status: SHIPPED | OUTPATIENT
Start: 2024-01-03

## 2024-01-04 ENCOUNTER — APPOINTMENT (OUTPATIENT)
Dept: RADIATION ONCOLOGY | Facility: CLINIC | Age: 89
End: 2024-01-04
Payer: MEDICARE

## 2024-01-04 PROCEDURE — 77386 HB NTSTY MODUL RAD TX DLVR CPLX: CPT | Performed by: RADIOLOGY

## 2024-01-04 PROCEDURE — 77336 RADIATION PHYSICS CONSULT: CPT | Performed by: INTERNAL MEDICINE

## 2024-01-04 PROCEDURE — 77014 CHG CT GUIDANCE RADIATION THERAPY FLDS PLACEMENT: CPT | Performed by: RADIOLOGY

## 2024-01-05 ENCOUNTER — APPOINTMENT (OUTPATIENT)
Dept: RADIATION ONCOLOGY | Facility: CLINIC | Age: 89
End: 2024-01-05
Payer: MEDICARE

## 2024-01-05 PROCEDURE — 77386 HB NTSTY MODUL RAD TX DLVR CPLX: CPT | Performed by: INTERNAL MEDICINE

## 2024-01-05 PROCEDURE — 77427 RADIATION TX MANAGEMENT X5: CPT | Performed by: RADIOLOGY

## 2024-01-05 PROCEDURE — 77014 CHG CT GUIDANCE RADIATION THERAPY FLDS PLACEMENT: CPT | Performed by: INTERNAL MEDICINE

## 2024-01-08 ENCOUNTER — APPOINTMENT (OUTPATIENT)
Dept: RADIATION ONCOLOGY | Facility: CLINIC | Age: 89
End: 2024-01-08
Payer: MEDICARE

## 2024-01-08 PROCEDURE — 77386 HB NTSTY MODUL RAD TX DLVR CPLX: CPT | Performed by: RADIOLOGY

## 2024-01-08 PROCEDURE — 77014 CHG CT GUIDANCE RADIATION THERAPY FLDS PLACEMENT: CPT | Performed by: RADIOLOGY

## 2024-01-08 NOTE — ASSESSMENT & PLAN NOTE
Currently controlled on no medication 
Lab Results   Component Value Date    HGBA1C 7 2 01/23/2019       No results for input(s): POCGLU in the last 72 hours  Blood Sugar Average: Last 72 hrs:   hemoglobin A1c up to 7 2  Patient declines medication  Feels that if he more regularly checks his sugar he will also adhere to a better diet 
Stable  CEA level slightly decreased from 1 year ago 
Patient/Caregiver provided printed discharge information.

## 2024-01-09 ENCOUNTER — APPOINTMENT (OUTPATIENT)
Dept: RADIATION ONCOLOGY | Facility: CLINIC | Age: 89
End: 2024-01-09
Payer: MEDICARE

## 2024-01-09 DIAGNOSIS — R39.11 BENIGN PROSTATIC HYPERPLASIA WITH URINARY HESITANCY: ICD-10-CM

## 2024-01-09 DIAGNOSIS — I48.0 PAROXYSMAL ATRIAL FIBRILLATION (HCC): ICD-10-CM

## 2024-01-09 DIAGNOSIS — N40.1 BENIGN PROSTATIC HYPERPLASIA WITH URINARY HESITANCY: ICD-10-CM

## 2024-01-09 PROCEDURE — 77386 HB NTSTY MODUL RAD TX DLVR CPLX: CPT | Performed by: RADIOLOGY

## 2024-01-09 PROCEDURE — 77014 CHG CT GUIDANCE RADIATION THERAPY FLDS PLACEMENT: CPT | Performed by: RADIOLOGY

## 2024-01-09 RX ORDER — APIXABAN 5 MG/1
5 TABLET, FILM COATED ORAL 2 TIMES DAILY
Qty: 180 TABLET | Refills: 1 | Status: SHIPPED | OUTPATIENT
Start: 2024-01-09

## 2024-01-09 RX ORDER — TAMSULOSIN HYDROCHLORIDE 0.4 MG/1
0.4 CAPSULE ORAL
Qty: 90 CAPSULE | Refills: 1 | Status: SHIPPED | OUTPATIENT
Start: 2024-01-09

## 2024-01-09 NOTE — TELEPHONE ENCOUNTER
This is for Misbah Amezcua, birth date March 26, 1935. He needs a prescription for Eliquis. 5 milligrams, twice a day, three months supply and that should go to [facility] home delivery. And then he also needs a prescription for TANSULOSIN. That's 0.4kg and we'd like a three month supply and that should go to [facility] in [place]. [place]. The one goes to the [facility] mail order and the second one goes to [facility] in [place]. Our phone number here is [phone]. Thank you.    I'm not sure why they always ask for 1 year supply. Medicare has always required new scripts every 6 months    Verified with wife re: pharmacies. Mailorder has been updated with new Rx plan requirements. Wife says to leave [facility] in [place] vs changing to [facility]

## 2024-01-10 ENCOUNTER — APPOINTMENT (OUTPATIENT)
Dept: RADIATION ONCOLOGY | Facility: CLINIC | Age: 89
End: 2024-01-10
Payer: MEDICARE

## 2024-01-10 PROCEDURE — 77386 HB NTSTY MODUL RAD TX DLVR CPLX: CPT | Performed by: INTERNAL MEDICINE

## 2024-01-10 PROCEDURE — 77014 CHG CT GUIDANCE RADIATION THERAPY FLDS PLACEMENT: CPT | Performed by: INTERNAL MEDICINE

## 2024-01-11 ENCOUNTER — APPOINTMENT (OUTPATIENT)
Dept: RADIATION ONCOLOGY | Facility: CLINIC | Age: 89
End: 2024-01-11
Attending: RADIOLOGY
Payer: MEDICARE

## 2024-01-11 PROCEDURE — 77014 CHG CT GUIDANCE RADIATION THERAPY FLDS PLACEMENT: CPT | Performed by: RADIOLOGY

## 2024-01-11 PROCEDURE — 77386 HB NTSTY MODUL RAD TX DLVR CPLX: CPT | Performed by: RADIOLOGY

## 2024-01-11 PROCEDURE — 77336 RADIATION PHYSICS CONSULT: CPT | Performed by: RADIOLOGY

## 2024-01-12 ENCOUNTER — APPOINTMENT (OUTPATIENT)
Dept: RADIATION ONCOLOGY | Facility: CLINIC | Age: 89
End: 2024-01-12
Attending: RADIOLOGY
Payer: MEDICARE

## 2024-01-12 PROCEDURE — 77386 HB NTSTY MODUL RAD TX DLVR CPLX: CPT | Performed by: RADIOLOGY

## 2024-01-12 PROCEDURE — 77014 CHG CT GUIDANCE RADIATION THERAPY FLDS PLACEMENT: CPT | Performed by: RADIOLOGY

## 2024-01-12 PROCEDURE — 77427 RADIATION TX MANAGEMENT X5: CPT | Performed by: RADIOLOGY

## 2024-01-15 ENCOUNTER — APPOINTMENT (OUTPATIENT)
Dept: RADIATION ONCOLOGY | Facility: CLINIC | Age: 89
End: 2024-01-15
Payer: MEDICARE

## 2024-01-15 PROCEDURE — 77386 HB NTSTY MODUL RAD TX DLVR CPLX: CPT | Performed by: RADIOLOGY

## 2024-01-15 PROCEDURE — 77014 CHG CT GUIDANCE RADIATION THERAPY FLDS PLACEMENT: CPT | Performed by: RADIOLOGY

## 2024-01-16 ENCOUNTER — APPOINTMENT (OUTPATIENT)
Dept: RADIATION ONCOLOGY | Facility: CLINIC | Age: 89
End: 2024-01-16
Payer: MEDICARE

## 2024-01-17 ENCOUNTER — APPOINTMENT (OUTPATIENT)
Dept: RADIATION ONCOLOGY | Facility: CLINIC | Age: 89
End: 2024-01-17
Payer: MEDICARE

## 2024-01-17 PROCEDURE — 77386 HB NTSTY MODUL RAD TX DLVR CPLX: CPT | Performed by: RADIOLOGY

## 2024-01-17 PROCEDURE — 77014 CHG CT GUIDANCE RADIATION THERAPY FLDS PLACEMENT: CPT | Performed by: RADIOLOGY

## 2024-01-18 ENCOUNTER — APPOINTMENT (OUTPATIENT)
Dept: RADIATION ONCOLOGY | Facility: CLINIC | Age: 89
End: 2024-01-18
Payer: MEDICARE

## 2024-01-18 PROCEDURE — 77386 HB NTSTY MODUL RAD TX DLVR CPLX: CPT | Performed by: RADIOLOGY

## 2024-01-18 PROCEDURE — 77014 CHG CT GUIDANCE RADIATION THERAPY FLDS PLACEMENT: CPT | Performed by: RADIOLOGY

## 2024-01-19 ENCOUNTER — APPOINTMENT (OUTPATIENT)
Dept: RADIATION ONCOLOGY | Facility: CLINIC | Age: 89
End: 2024-01-19
Payer: MEDICARE

## 2024-01-19 PROCEDURE — 77014 CHG CT GUIDANCE RADIATION THERAPY FLDS PLACEMENT: CPT | Performed by: RADIOLOGY

## 2024-01-19 PROCEDURE — 77386 HB NTSTY MODUL RAD TX DLVR CPLX: CPT | Performed by: RADIOLOGY

## 2024-01-19 PROCEDURE — 77336 RADIATION PHYSICS CONSULT: CPT | Performed by: RADIOLOGY

## 2024-01-22 ENCOUNTER — APPOINTMENT (OUTPATIENT)
Dept: RADIATION ONCOLOGY | Facility: CLINIC | Age: 89
End: 2024-01-22
Payer: MEDICARE

## 2024-01-22 PROCEDURE — 77427 RADIATION TX MANAGEMENT X5: CPT | Performed by: RADIOLOGY

## 2024-01-22 PROCEDURE — 77386 HB NTSTY MODUL RAD TX DLVR CPLX: CPT | Performed by: RADIOLOGY

## 2024-01-22 PROCEDURE — 77014 CHG CT GUIDANCE RADIATION THERAPY FLDS PLACEMENT: CPT | Performed by: RADIOLOGY

## 2024-01-23 ENCOUNTER — APPOINTMENT (OUTPATIENT)
Dept: RADIATION ONCOLOGY | Facility: CLINIC | Age: 89
End: 2024-01-23
Payer: MEDICARE

## 2024-01-23 PROCEDURE — 77386 HB NTSTY MODUL RAD TX DLVR CPLX: CPT | Performed by: RADIOLOGY

## 2024-01-23 PROCEDURE — 77014 CHG CT GUIDANCE RADIATION THERAPY FLDS PLACEMENT: CPT | Performed by: RADIOLOGY

## 2024-01-24 ENCOUNTER — APPOINTMENT (OUTPATIENT)
Dept: RADIATION ONCOLOGY | Facility: CLINIC | Age: 89
End: 2024-01-24
Payer: MEDICARE

## 2024-01-24 PROCEDURE — 77386 HB NTSTY MODUL RAD TX DLVR CPLX: CPT | Performed by: RADIOLOGY

## 2024-01-24 PROCEDURE — 77014 CHG CT GUIDANCE RADIATION THERAPY FLDS PLACEMENT: CPT | Performed by: RADIOLOGY

## 2024-01-25 ENCOUNTER — APPOINTMENT (OUTPATIENT)
Dept: RADIATION ONCOLOGY | Facility: CLINIC | Age: 89
End: 2024-01-25
Payer: MEDICARE

## 2024-01-25 PROCEDURE — 77014 CHG CT GUIDANCE RADIATION THERAPY FLDS PLACEMENT: CPT | Performed by: RADIOLOGY

## 2024-01-25 PROCEDURE — 77386 HB NTSTY MODUL RAD TX DLVR CPLX: CPT | Performed by: RADIOLOGY

## 2024-01-26 ENCOUNTER — APPOINTMENT (OUTPATIENT)
Dept: RADIATION ONCOLOGY | Facility: CLINIC | Age: 89
End: 2024-01-26
Payer: MEDICARE

## 2024-01-26 PROCEDURE — 77014 CHG CT GUIDANCE RADIATION THERAPY FLDS PLACEMENT: CPT | Performed by: RADIOLOGY

## 2024-01-26 PROCEDURE — 77386 HB NTSTY MODUL RAD TX DLVR CPLX: CPT | Performed by: RADIOLOGY

## 2024-01-26 PROCEDURE — 77336 RADIATION PHYSICS CONSULT: CPT | Performed by: RADIOLOGY

## 2024-01-29 ENCOUNTER — APPOINTMENT (OUTPATIENT)
Dept: RADIATION ONCOLOGY | Facility: CLINIC | Age: 89
End: 2024-01-29
Payer: MEDICARE

## 2024-01-29 PROCEDURE — 77386 HB NTSTY MODUL RAD TX DLVR CPLX: CPT | Performed by: RADIOLOGY

## 2024-01-29 PROCEDURE — 77427 RADIATION TX MANAGEMENT X5: CPT | Performed by: RADIOLOGY

## 2024-01-29 PROCEDURE — 77014 CHG CT GUIDANCE RADIATION THERAPY FLDS PLACEMENT: CPT | Performed by: RADIOLOGY

## 2024-01-30 ENCOUNTER — APPOINTMENT (OUTPATIENT)
Dept: RADIATION ONCOLOGY | Facility: CLINIC | Age: 89
End: 2024-01-30
Payer: MEDICARE

## 2024-01-30 PROCEDURE — 77386 HB NTSTY MODUL RAD TX DLVR CPLX: CPT | Performed by: RADIOLOGY

## 2024-01-30 PROCEDURE — 77014 CHG CT GUIDANCE RADIATION THERAPY FLDS PLACEMENT: CPT | Performed by: RADIOLOGY

## 2024-01-31 ENCOUNTER — APPOINTMENT (OUTPATIENT)
Dept: RADIATION ONCOLOGY | Facility: CLINIC | Age: 89
End: 2024-01-31
Payer: MEDICARE

## 2024-01-31 PROCEDURE — 77014 CHG CT GUIDANCE RADIATION THERAPY FLDS PLACEMENT: CPT | Performed by: INTERNAL MEDICINE

## 2024-01-31 PROCEDURE — 77336 RADIATION PHYSICS CONSULT: CPT | Performed by: RADIOLOGY

## 2024-01-31 PROCEDURE — 77386 HB NTSTY MODUL RAD TX DLVR CPLX: CPT | Performed by: INTERNAL MEDICINE

## 2024-02-01 ENCOUNTER — RA CDI HCC (OUTPATIENT)
Dept: OTHER | Facility: HOSPITAL | Age: 89
End: 2024-02-01

## 2024-02-01 NOTE — PROGRESS NOTES
I13.0  E11.22  HCC coding opportunities          Chart Reviewed number of suggestions sent to Provider: 2     Patients Insurance     Medicare Insurance: Medicare

## 2024-02-02 DIAGNOSIS — F32.A DEPRESSION, UNSPECIFIED DEPRESSION TYPE: Primary | ICD-10-CM

## 2024-02-02 RX ORDER — ESCITALOPRAM OXALATE 10 MG/1
10 TABLET ORAL DAILY
Qty: 30 TABLET | Refills: 1 | Status: SHIPPED | OUTPATIENT
Start: 2024-02-02

## 2024-02-02 NOTE — PROGRESS NOTES
Patient's wife and son presented to office for conversation/consultation.  Patient's wife is his approved healthcare agent.  They note that patient's affect has been difficult for several years but recently worse.  More anger and outbursts.  He has no memory issues and his overall cognition is normal in their opinion.  Their main concern is his mood which is depressed and angry.  We discussed options for dealing with this problem including starting antidepressant medication which given this history is appropriate.  Patient  will need to agree to the treatment before starting.  I will start him on Lexapro 10 mg daily and he will return to the office for an appointment in 4 to 6 weeks for evaluation to update his progress.

## 2024-03-06 ENCOUNTER — RADIATION ONCOLOGY FOLLOW-UP (OUTPATIENT)
Dept: RADIATION ONCOLOGY | Facility: CLINIC | Age: 89
End: 2024-03-06
Payer: MEDICARE

## 2024-03-06 VITALS
TEMPERATURE: 97.7 F | SYSTOLIC BLOOD PRESSURE: 95 MMHG | OXYGEN SATURATION: 97 % | DIASTOLIC BLOOD PRESSURE: 64 MMHG | HEART RATE: 79 BPM

## 2024-03-06 DIAGNOSIS — C07 CANCER OF PAROTID GLAND (HCC): Primary | ICD-10-CM

## 2024-03-06 PROCEDURE — 99024 POSTOP FOLLOW-UP VISIT: CPT | Performed by: RADIOLOGY

## 2024-03-06 PROCEDURE — 99211 OFF/OP EST MAY X REQ PHY/QHP: CPT | Performed by: RADIOLOGY

## 2024-03-06 NOTE — PROGRESS NOTES
"Misbah Bagley 1935 is a 88 y.o. male H/o Stage I (cT1, cNO, cMO) cancer of left parotid gland.  Completed RT on 24.  Today's visit is an EOT follow-up.    24  Dr. Patino  Doing well.  C/O dry mouth.  F/U in 3 months    Upcomin24  [facility]    Follow up visit     Oncology History   Cancer of left parotid gland (HCC)   10/2/2023 Initial Diagnosis    Cancer of parotid gland (HCC)     10/24/2023 -  Cancer Staged    Staging form: Major Salivary Glands, AJCC 8th Edition  - Clinical stage from 10/24/2023: Stage I (cT1, cN0, cM0) - Signed by Rubens Fontana MD on 10/25/2023  Histopathologic type: Carcinoma, undifferentiated, NOS  Stage prefix: Initial diagnosis  Laterality: Left  Tumor size (mm): 17       2023 - 2024 Radiation      Plan ID Energy Fractions Dose per Fraction (cGy) Dose Correction (cGy) Total Dose Delivered (cGy) Elapsed Days   L ParotidBdNk 6X 33 / 33 200 0 6,600 49      Treatment dates:  C1: 2023 - 2024             Review of Systems:  Review of Systems   Constitutional:  Positive for fatigue.   HENT:  Positive for ear pain (hears \"crackling\" in right ear) and trouble swallowing (needs to drink liquids while eating). Negative for sore throat.    Eyes: Negative.         Glasses   Respiratory: Negative.     Cardiovascular: Negative.    Gastrointestinal: Negative.    Endocrine: Positive for cold intolerance.   Genitourinary: Negative.         On flomax   Musculoskeletal:  Positive for arthralgias, back pain and gait problem (wheelchair/walker).   Skin: Negative.    Allergic/Immunologic: Negative.    Neurological:  Negative for numbness.   Hematological:         On elequis   Psychiatric/Behavioral: Negative.         Clinical Trial: no    Teaching completed    Health Maintenance   Topic Date Due    Zoster Vaccine (2 of 2) 07/15/2018    Pneumococcal Vaccine: 65+ Years (2 of 2 - PPSV23 or PCV20) 2020    PT PLAN OF CARE  2021    DM Eye Exam  " 04/08/2022    Diabetic Foot Exam  10/31/2023    COVID-19 Vaccine (7 - 2023-24 season) 12/12/2023    HEMOGLOBIN A1C  06/05/2024    Kidney Health Evaluation: Albumin/Creatinine Ratio  06/06/2024    Kidney Health Evaluation: GFR  12/05/2024    Fall Risk  12/13/2024    Depression Screening  12/13/2024    Medicare Annual Wellness Visit (AWV)  12/13/2024    BMI: Adult  02/22/2025    Influenza Vaccine  Completed    HIB Vaccine  Aged Out    IPV Vaccine  Aged Out    Hepatitis A Vaccine  Aged Out    Meningococcal ACWY Vaccine  Aged Out    HPV Vaccine  Aged Out    Colonoscopy  Discontinued     Patient Active Problem List   Diagnosis    Benign essential hypertension    History of malignant neoplasm of left colon    Chronic low back pain    Elevated CEA    Elevated transaminase level    Glaucoma    Positive depression screening    Type 2 diabetes mellitus with complication, without long-term current use of insulin (HCC)    Hypertriglyceridemia    Chronic arterial ischemic stroke    Chronic systolic heart failure (HCC)    Coronary artery disease involving native coronary artery    Paroxysmal atrial fibrillation (HCC)    Physical deconditioning    Status post placement of implantable loop recorder    Injury of right foot    Foot pain, right    Abdominal aortic aneurysm (AAA) without rupture (HCC)    Lung mass    Walker as ambulation aid    Stage 3a chronic kidney disease (HCC)    Closed fracture of second lumbar vertebra (HCC)    Chronic idiopathic constipation    Coagulation defect, unspecified (HCC)    Pre-op examination    Cancer of left parotid gland (HCC)     Past Medical History:   Diagnosis Date    Acute ischemic left MCA stroke (HCC) 06/21/2019    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 MRI small bleed CT 8-19 no bleed No neurology follow up LINQ 6-19 PAF seen 11-19   Last Assessment & Plan:  Good recovery CVA- recent LINQ trasmission showed PAF: Would like to start Eliquis but because had CNS bleed needs neurology  evaluation make sure ok. No change meds.    NICOLE (acute kidney injury) (HCC) 2020    Colon cancer (HCC)     age 61's    colon resection     chemo pills    Diabetes mellitus (HCC)     Fall     fell backwards from riding -  in ER-ok- incidental finding CXR   R lobe 2 masses-bx today    2021    Full dentures     Hyperlipidemia     Hypertension     Lung mass     R side x2,bx today 2021    Seasonal allergies     Stroke (HCC)     2019     speech loss     minimal residual    Vitreous hemorrhage of right eye (HCC) 2016    R artificial eye    Walker as ambulation aid     Wears glasses      Past Surgical History:   Procedure Laterality Date    CATARACT EXTRACTION      COLECTOMY      COLONOSCOPY      EYE SURGERY      R eye removed for hemorrhage    SC EXC PRTD HAYDEN/PRTD GLND TOT DSJ&PRSRV FACIAL NR Left 10/6/2023    Procedure: TOTAL PAROTIDECTOMY W/ NIMS W/ SIGNIFICANT AMOUNT OF SKIN WILL BE REMOVED & SKIN FLAP OR PRIMARY CLOSURE;  Surgeon: Sergio Brunson DO;  Location: [facility];  Service: ENT    SEPTOPLASTY       Family History   Family history unknown: Yes     Social History     Socioeconomic History    Marital status: /Civil Union     Spouse name: Not on file    Number of children: Not on file    Years of education: Not on file    Highest education level: Not on file   Occupational History    Not on file   Tobacco Use    Smoking status: Former     Current packs/day: 0.00     Average packs/day: 1 pack/day for 40.0 years (40.0 ttl pk-yrs)     Types: Cigarettes     Start date:      Quit date:      Years since quittin.1    Smokeless tobacco: Never    Tobacco comments:     quit 30 years ago (as of 2018)   Vaping Use    Vaping status: Never Used   Substance and Sexual Activity    Alcohol use: Yes     Alcohol/week: 1.0 standard drink of alcohol     Types: 1 Cans of beer per week     Comment: was drinking 1 beer daily    Drug use: Never    Sexual activity: Not  Currently   Other Topics Concern    Not on file   Social History Narrative    Not on file     Social Determinants of Health     Financial Resource Strain: Low Risk  (12/6/2023)    Overall Financial Resource Strain (CARDIA)     Difficulty of Paying Living Expenses: Not hard at all   Food Insecurity: No Food Insecurity (9/3/2023)    Received from [facility]    Hunger Vital Sign     Worried About Running Out of Food in the Last Year: Never true     Ran Out of Food in the Last Year: Never true   Transportation Needs: No Transportation Needs (12/6/2023)    PRAPARE - Transportation     Lack of Transportation (Medical): No     Lack of Transportation (Non-Medical): No   Physical Activity: Not on file   Stress: Not on file   Social Connections: Not on file   Intimate Partner Violence: Not At Risk (9/3/2023)    Received from [facility]    Humiliation, Afraid, Rape, and Kick questionnaire     Fear of Current or Ex-Partner: No     Emotionally Abused: No     Physically Abused: No     Sexually Abused: No   Housing Stability: Low Risk  (9/3/2023)    Received from [facility]    Housing Stability Vital Sign     Unable to Pay for Housing in the Last Year: No     Number of Places Lived in the Last Year: 1     Unstable Housing in the Last Year: No       Current Outpatient Medications:     acetaminophen (TYLENOL) 325 mg tablet, every 6 (six) hours, Disp: , Rfl:     atorvastatin (LIPITOR) 40 mg tablet, Take 1 tablet (40 mg total) by mouth daily at bedtime, Disp: 90 tablet, Rfl: 3    Brinzolamide-Brimonidine (Simbrinza) 1-0.2 % SUSP, Apply to eye every 8 (eight) hours, Disp: , Rfl:     Cholecalciferol (Vitamin D-3) 25 MCG (1000 UT) CAPS, Take by mouth, Disp: , Rfl:     diphenhydramine, lidocaine, Al/Mg hydroxide, simethicone (Magic Mouthwash) SUSP, Swish and swallow 10 mL every 4 (four) hours as needed for mouth pain or discomfort, Disp: 480 mL, Rfl: 1    Eliquis 5 MG, Take 1 tablet (5  mg total) by mouth 2 (two) times a day, Disp: 180 tablet, Rfl: 1    escitalopram (Lexapro) 10 mg tablet, Take 1 tablet (10 mg total) by mouth daily, Disp: 30 tablet, Rfl: 1    famotidine (PEPCID) 10 mg tablet, Take 10 mg by mouth if needed for heartburn, Disp: , Rfl:     glucose blood (Wilmer Contour Test) test strip, Test Once Daily, Disp: 100 each, Rfl: 1    guaiFENesin (MUCINEX) 600 mg 12 hr tablet, Take 1,200 mg by mouth every 12 (twelve) hours, Disp: , Rfl:     Lancets MISC, To test BS once daily., Disp: 100 each, Rfl: 3    lisinopril (ZESTRIL) 20 mg tablet, Take 1 tablet (20 mg total) by mouth daily, Disp: 90 tablet, Rfl: 3    Lumigan 0.01 % ophthalmic drops, Administer into the left eye  , Disp: , Rfl:     metFORMIN (GLUCOPHAGE) 500 mg tablet, Take 1 tablet (500 mg total) by mouth daily with breakfast, Disp: 90 tablet, Rfl: 3    metoprolol succinate (TOPROL-XL) 100 mg 24 hr tablet, Take 1 tablet (100 mg total) by mouth daily, Disp: 90 tablet, Rfl: 3    nitroglycerin (NITROSTAT) 0.4 mg SL tablet, Place 0.4 mg under the tongue as needed (Patient not taking: Reported on 10/24/2023), Disp: , Rfl:     oxyCODONE-acetaminophen (PERCOCET) 5-325 mg per tablet, Take 1 tablet by mouth every 6 (six) hours as needed for moderate pain Max Daily Amount: 4 tablets (Patient not taking: Reported on 10/24/2023), Disp: 20 tablet, Rfl: 0    tamsulosin (FLOMAX) 0.4 mg, Take 1 capsule (0.4 mg total) by mouth daily with dinner, Disp: 90 capsule, Rfl: 1    tiZANidine (ZANAFLEX) 2 mg tablet, Take 1 tablet (2 mg total) by mouth 2 (two) times a day as needed for muscle spasms, Disp: 20 tablet, Rfl: 0    traMADol (ULTRAM) 50 mg tablet, Take 50 mg by mouth every 6 (six) hours as needed for moderate pain, Disp: , Rfl:     vitamin B-12 (VITAMIN B-12) 1,000 mcg tablet, Take 1,000 mcg by mouth daily, Disp: , Rfl:   No Known Allergies  There were no vitals filed for this visit.

## 2024-03-06 NOTE — PROGRESS NOTES
Follow-up - Radiation Oncology   Misbah Bagley 1935 88 y.o. male [id number]      History of Present Illness   Cancer Staging   Cancer of left parotid gland (HCC)  Staging form: Major Salivary Glands, AJCC 8th Edition  - Clinical stage from 10/24/2023: Stage I (cT1, cN0, cM0) - Signed by Rubens Fontana MD on 10/25/2023  Histopathologic type: Carcinoma, undifferentiated, NOS  Stage prefix: Initial diagnosis  Laterality: Left  Tumor size (mm): Rosy Bagley is a 88 y.o. year old male with a history of a Stage I (cT1, cNO, cMO) cancer of left parotid gland. Completed post-op RT on 1/31/24.  Today's visit is an EOT follow-up.     Interval History:  2/22/24  Dr. Patino  Doing well.  C/O dry mouth.  F/U in 3 months     He is seen today with his wife.  He has recovered well from radiation therapy.  He reports less fatigue.  His skin reaction in the neck has resolved.  He is still applying lotion daily.  He is having no dysphagia.  His taste is improving.  He still has some dryness in the mouth.  He started Lexapro on February 2, 2024 and reports he is feeling better.  He had stopped smoking tobacco in 1997.    Upcoming:  3/8/24 Dr. Khan  5/23/24  Dr. Patino    Historical Information   Oncology History   Cancer of left parotid gland (HCC)   10/2/2023 Initial Diagnosis    Cancer of parotid gland (HCC)     10/24/2023 -  Cancer Staged    Staging form: Major Salivary Glands, AJCC 8th Edition  - Clinical stage from 10/24/2023: Stage I (cT1, cN0, cM0) - Signed by Rubens Fontana MD on 10/25/2023  Histopathologic type: Carcinoma, undifferentiated, NOS  Stage prefix: Initial diagnosis  Laterality: Left  Tumor size (mm): 17       12/13/2023 - 1/31/2024 Radiation      Plan ID Energy Fractions Dose per Fraction (cGy) Dose Correction (cGy) Total Dose Delivered (cGy) Elapsed Days   L ParotidBdNk 6X 33 / 33 200 0 6,600 49      Treatment dates:  C1: 12/13/2023 - 1/31/2024              Past Medical History:   Diagnosis Date    Acute ischemic left MCA stroke (HCC) 06/21/2019    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 MRI small bleed CT 8-19 no bleed No neurology follow up LINQ 6-19 PAF seen 11-19   Last Assessment & Plan:  Good recovery CVA- recent LINQ trasmission showed PAF: Would like to start Eliquis but because had CNS bleed needs neurology evaluation make sure ok. No change meds.    NICOLE (acute kidney injury) (HCC) 04/09/2020    Colon cancer (HCC)     age 61's    colon resection     chemo pills    Diabetes mellitus (HCC)     Fall     fell backwards from riding -  in ER-ok- incidental finding CXR   R lobe 2 masses-bx today    12/21/2021    Full dentures     Hyperlipidemia     Hypertension     Lung mass     R side x2,bx today 12/21/2021    Seasonal allergies     Stroke (HCC)     6/2019     speech loss     minimal residual    Vitreous hemorrhage of right eye (HCC) 07/19/2016    R artificial eye    Walker as ambulation aid     Wears glasses      Past Surgical History:   Procedure Laterality Date    CATARACT EXTRACTION      COLECTOMY      COLONOSCOPY      EYE SURGERY      R eye removed for hemorrhage    [place] EXC PRTD HAYDEN/PRTD GLND TOT DSJ&PRSRV FACIAL NR Left 10/6/2023    Procedure: TOTAL PAROTIDECTOMY W/ NIMS W/ SIGNIFICANT AMOUNT OF SKIN WILL BE REMOVED & SKIN FLAP OR PRIMARY CLOSURE;  Surgeon: Sergio Brunson DO;  Location: [facility];  Service: ENT    SEPTOPLASTY         Social History   Social History     Substance and Sexual Activity   Alcohol Use Not Currently    Alcohol/week: 1.0 standard drink of alcohol    Types: 1 Cans of beer per week    Comment: was drinking 1 beer daily     Social History     Substance and Sexual Activity   Drug Use No     Social History     Tobacco Use   Smoking Status Former    Current packs/day: 0.00    Average packs/day: 1 pack/day for 40.0 years (40.0 ttl pk-yrs)    Types: Cigarettes    Start date: 1957    Quit date: 1997    Years since  quittin.1   Smokeless Tobacco Never   Tobacco Comments    quit 30 years ago (as of 2018)     Meds/Allergies     Current Outpatient Medications:     acetaminophen (TYLENOL) 325 mg tablet, every 6 (six) hours, Disp: , Rfl:     atorvastatin (LIPITOR) 40 mg tablet, Take 1 tablet (40 mg total) by mouth daily at bedtime, Disp: 90 tablet, Rfl: 3    Brinzolamide-Brimonidine (Simbrinza) 1-0.2 % SUSP, Apply to eye every 8 (eight) hours, Disp: , Rfl:     Cholecalciferol (Vitamin D-3) 25 MCG (1000 UT) CAPS, Take by mouth, Disp: , Rfl:     Eliquis 5 MG, Take 1 tablet (5 mg total) by mouth 2 (two) times a day, Disp: 180 tablet, Rfl: 1    escitalopram (Lexapro) 10 mg tablet, Take 1 tablet (10 mg total) by mouth daily, Disp: 30 tablet, Rfl: 1    famotidine (PEPCID) 10 mg tablet, Take 10 mg by mouth if needed for heartburn, Disp: , Rfl:     glucose blood (Wilmer Contour Test) test strip, Test Once Daily, Disp: 100 each, Rfl: 1    guaiFENesin (MUCINEX) 600 mg 12 hr tablet, Take 1,200 mg by mouth every 12 (twelve) hours, Disp: , Rfl:     Lancets MISC, To test BS once daily., Disp: 100 each, Rfl: 3    lisinopril (ZESTRIL) 20 mg tablet, Take 1 tablet (20 mg total) by mouth daily, Disp: 90 tablet, Rfl: 3    Lumigan 0.01 % ophthalmic drops, Administer into the left eye  , Disp: , Rfl:     metFORMIN (GLUCOPHAGE) 500 mg tablet, Take 1 tablet (500 mg total) by mouth daily with breakfast, Disp: 90 tablet, Rfl: 3    metoprolol succinate (TOPROL-XL) 100 mg 24 hr tablet, Take 1 tablet (100 mg total) by mouth daily, Disp: 90 tablet, Rfl: 3    tamsulosin (FLOMAX) 0.4 mg, Take 1 capsule (0.4 mg total) by mouth daily with dinner, Disp: 90 capsule, Rfl: 1    vitamin B-12 (VITAMIN B-12) 1,000 mcg tablet, Take 1,000 mcg by mouth daily, Disp: , Rfl:     diphenhydramine, lidocaine, Al/Mg hydroxide, simethicone (Magic Mouthwash) SUSP, Swish and swallow 10 mL every 4 (four) hours as needed for mouth pain or discomfort (Patient not taking: Reported  "on 3/6/2024), Disp: 480 mL, Rfl: 1    nitroglycerin (NITROSTAT) 0.4 mg SL tablet, Place 0.4 mg under the tongue as needed (Patient not taking: Reported on 10/24/2023), Disp: , Rfl:     oxyCODONE-acetaminophen (PERCOCET) 5-325 mg per tablet, Take 1 tablet by mouth every 6 (six) hours as needed for moderate pain Max Daily Amount: 4 tablets (Patient not taking: Reported on 10/24/2023), Disp: 20 tablet, Rfl: 0    tiZANidine (ZANAFLEX) 2 mg tablet, Take 1 tablet (2 mg total) by mouth 2 (two) times a day as needed for muscle spasms (Patient not taking: Reported on 3/6/2024), Disp: 20 tablet, Rfl: 0    traMADol (ULTRAM) 50 mg tablet, Take 50 mg by mouth every 6 (six) hours as needed for moderate pain (Patient not taking: Reported on 3/6/2024), Disp: , Rfl:   No Known Allergies    Review of Systems  Constitutional:  Positive for fatigue.   HENT:  Positive for ear pain (hears \"crackling\" in right ear) and trouble swallowing (needs to drink liquids while eating). Negative for sore throat.    Eyes: Negative.         Glasses   Respiratory: Negative.     Cardiovascular: Negative.    Gastrointestinal: Negative.    Endocrine: Positive for cold intolerance.   Genitourinary: Negative.         On flomax   Musculoskeletal:  Positive for arthralgias, back pain and gait problem (wheelchair/walker).   Skin: Negative.    Allergic/Immunologic: Negative.    Neurological:  Negative for numbness.   Hematological:         On elequis   Psychiatric/Behavioral: Negativ    OBJECTIVE:   BP 95/64   Pulse 79   Temp 97.7 °F (36.5 °C)   SpO2 97%   Pain Assessment:  0  ECOG/Zubrod/WHO: 2 - Symptomatic, <50% confined to bed    Physical Exam   Vitals and nursing note reviewed.   Constitutional:       General: He is not in acute distress.     Appearance: Normal appearance. He is well-developed. He is not ill-appearing or diaphoretic.   HENT:      Head: Normocephalic and atraumatic.      Mouth/Throat:      Mouth: Mucous membranes are moist.      Pharynx: " No oropharyngeal exudate or posterior oropharyngeal erythema.      Comments: He is edentulous and has full dentures.  Eyes:      General: No scleral icterus.     Conjunctiva/sclera: Conjunctivae normal.      Pupils: Pupils are equal, round, and reactive to light.      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   Neck:      Thyroid: No thyromegaly.      Vascular: No carotid bruit.      Trachea: No tracheal deviation.      Comments: Left neck/parotid incision is well healed with some underlying induration and no nodularity as well as no lymphadenopathy. He has mild post radiation changes.   Cardiovascular:      Rate and Rhythm: Normal rate and regular rhythm.      Heart sounds: Normal heart sounds.   Pulmonary:      Effort: Pulmonary effort is normal. No respiratory distress.      Breath sounds: Normal breath sounds. No wheezing or rales.   Abdominal:      General: Bowel sounds are normal. There is no distension.      Palpations: Abdomen is soft. There is no mass.      Tenderness: There is no abdominal tenderness.   Musculoskeletal:         General: No swelling or tenderness. Normal range of motion.      Cervical back: Normal range of motion and neck supple. No rigidity or tenderness.      Right lower leg: No edema.      Left lower leg: No edema.      Comments: He walks with a walker with a slow shuffled gait.   Lymphadenopathy:      Cervical: No cervical adenopathy.   Skin:     General: Skin is warm and dry.      Coloration: Skin is not pale.      Findings: No erythema or rash.   Neurological:      General: No focal deficit present.      Mental Status: He is alert and oriented to person, place, and time.      Cranial Nerves: No cranial nerve deficit.      Coordination: Coordination normal.   Psychiatric:         Mood and Affect: Mood normal.         Behavior: Behavior normal.         Thought Content: Thought content normal.         Judgment: Judgment normal.      RESULTS    Lab Results: No results found  for this or any previous visit (from the past 672 hour(s)).    Imaging Studies:No results found.    Assessment/Plan:  No orders of the defined types were placed in this encounter.       Misbah Bagley is a 88 y.o. 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  He had fine-needle aspiration of the left parotid July 27, 2023 that revealed malignancy favoring non-small cell carcinoma.  He had work-up with a neck CT with contrast as well as a PET/CT study that revealed a 1.7 cm maximal diameter left posterior inferior parotid gland lesion with an SUV of 7.6 compatible with malignancy.  There was a small focus of uptake deeper in the parotid gland and just posterior to the mandible.  There was no evidence of cervical adenopathy and no distant metastatic disease.  There was focal uptake along the skin superficial to the left parotid gland.  He did have a left cheek basal cell carcinoma excised on July 27, 2023.  He is status post left total parotidectomy with nerve sparing integrity monitoring system with Dr. Brunson on October 6, 2023.  The preliminary pathology report revealed high-grade carcinoma involving the left superficial and left deep parotid gland.  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  There was a separate basal cell carcinoma identified.  Surgical margins were negative.  There were a total of 6 negative lymph nodes.  The patient has clinical stage I, T1 N0 M0 disease.    We recommended postoperative radiation therapy for his stage I parotid gland carcinoma with high-grade disease since they have a higher risk of local recurrence.  He completed postoperative radiation therapy to the left parotid bed as well as left neck over 6-1/2 weeks with 33 fractions to a total dose of 6600 cGy.      He returns for follow examination today.  He  "is recovering well from treatment.  He has no clinical evidence of any recurrent disease.  He saw Dr. Brunson on February 22, 2024 and will see Dr. Brunson again on May 23, 2024.  He will return here for follow-up in 6 months.      Rubens Fontana MD  3/6/2024,9:55 AM    Portions of the record may have been created with voice recognition software.  Occasional wrong word or \"sound a like\" substitutions may have occurred due to the inherent limitations of voice recognition software.  Read the chart carefully and recognize, using context, where substitutions have occurred.        "

## 2024-03-08 ENCOUNTER — OFFICE VISIT (OUTPATIENT)
Dept: FAMILY MEDICINE CLINIC | Facility: CLINIC | Age: 89
End: 2024-03-08
Payer: MEDICARE

## 2024-03-08 VITALS
HEART RATE: 67 BPM | DIASTOLIC BLOOD PRESSURE: 68 MMHG | SYSTOLIC BLOOD PRESSURE: 126 MMHG | BODY MASS INDEX: 22.96 KG/M2 | WEIGHT: 138 LBS | OXYGEN SATURATION: 97 % | TEMPERATURE: 97.6 F

## 2024-03-08 DIAGNOSIS — I71.40 ABDOMINAL AORTIC ANEURYSM (AAA) WITHOUT RUPTURE, UNSPECIFIED PART (HCC): ICD-10-CM

## 2024-03-08 DIAGNOSIS — I50.22 CHRONIC SYSTOLIC HEART FAILURE (HCC): ICD-10-CM

## 2024-03-08 DIAGNOSIS — E11.9 TYPE 2 DIABETES MELLITUS WITHOUT COMPLICATION, WITHOUT LONG-TERM CURRENT USE OF INSULIN (HCC): ICD-10-CM

## 2024-03-08 DIAGNOSIS — N40.1 BENIGN PROSTATIC HYPERPLASIA WITH URINARY HESITANCY: ICD-10-CM

## 2024-03-08 DIAGNOSIS — I10 BENIGN ESSENTIAL HYPERTENSION: ICD-10-CM

## 2024-03-08 DIAGNOSIS — I48.0 PAROXYSMAL ATRIAL FIBRILLATION (HCC): ICD-10-CM

## 2024-03-08 DIAGNOSIS — D68.9 COAGULATION DEFECT, UNSPECIFIED (HCC): ICD-10-CM

## 2024-03-08 DIAGNOSIS — R39.11 BENIGN PROSTATIC HYPERPLASIA WITH URINARY HESITANCY: ICD-10-CM

## 2024-03-08 DIAGNOSIS — E78.2 MIXED HYPERLIPIDEMIA: ICD-10-CM

## 2024-03-08 DIAGNOSIS — F32.A DEPRESSION, UNSPECIFIED DEPRESSION TYPE: Primary | ICD-10-CM

## 2024-03-08 DIAGNOSIS — N18.31 STAGE 3A CHRONIC KIDNEY DISEASE (HCC): ICD-10-CM

## 2024-03-08 DIAGNOSIS — E11.8 TYPE 2 DIABETES MELLITUS WITH COMPLICATION, WITHOUT LONG-TERM CURRENT USE OF INSULIN (HCC): ICD-10-CM

## 2024-03-08 PROCEDURE — G2211 COMPLEX E/M VISIT ADD ON: HCPCS | Performed by: FAMILY MEDICINE

## 2024-03-08 PROCEDURE — 99213 OFFICE O/P EST LOW 20 MIN: CPT | Performed by: FAMILY MEDICINE

## 2024-03-08 RX ORDER — ATORVASTATIN CALCIUM 40 MG/1
40 TABLET, FILM COATED ORAL
Qty: 90 TABLET | Refills: 3 | Status: SHIPPED | OUTPATIENT
Start: 2024-03-08

## 2024-03-08 RX ORDER — METOPROLOL SUCCINATE 100 MG/1
100 TABLET, EXTENDED RELEASE ORAL DAILY
Qty: 90 TABLET | Refills: 3 | Status: SHIPPED | OUTPATIENT
Start: 2024-03-08

## 2024-03-08 RX ORDER — LISINOPRIL 20 MG/1
20 TABLET ORAL DAILY
Qty: 90 TABLET | Refills: 3 | Status: SHIPPED | OUTPATIENT
Start: 2024-03-08

## 2024-03-08 RX ORDER — APIXABAN 5 MG/1
5 TABLET, FILM COATED ORAL 2 TIMES DAILY
Qty: 180 TABLET | Refills: 1 | Status: SHIPPED | OUTPATIENT
Start: 2024-03-08

## 2024-03-08 RX ORDER — TAMSULOSIN HYDROCHLORIDE 0.4 MG/1
0.4 CAPSULE ORAL
Qty: 90 CAPSULE | Refills: 1 | Status: SHIPPED | OUTPATIENT
Start: 2024-03-08

## 2024-03-08 RX ORDER — ESCITALOPRAM OXALATE 10 MG/1
10 TABLET ORAL DAILY
Qty: 90 TABLET | Refills: 1 | Status: SHIPPED | OUTPATIENT
Start: 2024-03-08

## 2024-03-08 NOTE — PROGRESS NOTES
Name: Misbah Bagley      : 1935      MRN: [id number]  Encounter Provider: Gregory Khan DO  Encounter Date: 3/8/2024   Encounter department: [facility]    Assessment & Plan     1. Depression, unspecified depression type  Assessment & Plan:  Mood much improved on Lexapro.  Continue at 10 mg daily.    Orders:  -     escitalopram (Lexapro) 10 mg tablet; Take 1 tablet (10 mg total) by mouth daily    2. Mixed hyperlipidemia  -     atorvastatin (LIPITOR) 40 mg tablet; Take 1 tablet (40 mg total) by mouth daily at bedtime    3. Paroxysmal atrial fibrillation (HCC)  -     Eliquis 5 MG; Take 1 tablet (5 mg total) by mouth 2 (two) times a day    4. Benign essential hypertension  -     lisinopril (ZESTRIL) 20 mg tablet; Take 1 tablet (20 mg total) by mouth daily  -     metoprolol succinate (TOPROL-XL) 100 mg 24 hr tablet; Take 1 tablet (100 mg total) by mouth daily    5. Type 2 diabetes mellitus without complication, without long-term current use of insulin (HCC)  -     metFORMIN (GLUCOPHAGE) 500 mg tablet; Take 1 tablet (500 mg total) by mouth daily with breakfast    6. Benign prostatic hyperplasia with urinary hesitancy  -     tamsulosin (FLOMAX) 0.4 mg; Take 1 capsule (0.4 mg total) by mouth daily with dinner    7. Chronic systolic heart failure (HCC)    8. Type 2 diabetes mellitus with complication, without long-term current use of insulin (HCC)    9. Abdominal aortic aneurysm (AAA) without rupture, unspecified part (HCC)    10. Coagulation defect, unspecified (HCC)    11. Stage 3a chronic kidney disease (HCC)           Subjective      Primary reason for appointment was to follow-up on Lexapro.  Patient was started approximately 1 month ago.  He notes no problems with the medication, no side effects.  Wife notes that his mood is improved significantly.  Less anger and fewer outbursts.      Review of Systems   Constitutional: Negative.    Respiratory: Negative.     Cardiovascular:  Negative.    Gastrointestinal: Negative.    Genitourinary: Negative.    Musculoskeletal: Negative.    Psychiatric/Behavioral:  Positive for dysphoric mood.        Current Outpatient Medications on File Prior to Visit   Medication Sig   • acetaminophen (TYLENOL) 325 mg tablet every 6 (six) hours   • Brinzolamide-Brimonidine (Simbrinza) 1-0.2 % SUSP Apply to eye every 8 (eight) hours   • Cholecalciferol (Vitamin D-3) 25 MCG (1000 UT) CAPS Take by mouth   • famotidine (PEPCID) 10 mg tablet Take 10 mg by mouth if needed for heartburn   • glucose blood (Wlimer Contour Test) test strip Test Once Daily   • guaiFENesin (MUCINEX) 600 mg 12 hr tablet Take 1,200 mg by mouth every 12 (twelve) hours   • Lancets MISC To test BS once daily.   • Lumigan 0.01 % ophthalmic drops Administer into the left eye     • vitamin B-12 (VITAMIN B-12) 1,000 mcg tablet Take 1,000 mcg by mouth daily   • [DISCONTINUED] atorvastatin (LIPITOR) 40 mg tablet Take 1 tablet (40 mg total) by mouth daily at bedtime   • [DISCONTINUED] Eliquis 5 MG Take 1 tablet (5 mg total) by mouth 2 (two) times a day   • [DISCONTINUED] escitalopram (Lexapro) 10 mg tablet Take 1 tablet (10 mg total) by mouth daily   • [DISCONTINUED] lisinopril (ZESTRIL) 20 mg tablet Take 1 tablet (20 mg total) by mouth daily   • [DISCONTINUED] metFORMIN (GLUCOPHAGE) 500 mg tablet Take 1 tablet (500 mg total) by mouth daily with breakfast   • [DISCONTINUED] metoprolol succinate (TOPROL-XL) 100 mg 24 hr tablet Take 1 tablet (100 mg total) by mouth daily   • [DISCONTINUED] tamsulosin (FLOMAX) 0.4 mg Take 1 capsule (0.4 mg total) by mouth daily with dinner   • diphenhydramine, lidocaine, Al/Mg hydroxide, simethicone (Magic Mouthwash) SUSP Swish and swallow 10 mL every 4 (four) hours as needed for mouth pain or discomfort (Patient not taking: Reported on 3/6/2024)   • nitroglycerin (NITROSTAT) 0.4 mg SL tablet Place 0.4 mg under the tongue as needed (Patient not taking: Reported on 10/24/2023)    • oxyCODONE-acetaminophen (PERCOCET) 5-325 mg per tablet Take 1 tablet by mouth every 6 (six) hours as needed for moderate pain Max Daily Amount: 4 tablets (Patient not taking: Reported on 10/24/2023)   • tiZANidine (ZANAFLEX) 2 mg tablet Take 1 tablet (2 mg total) by mouth 2 (two) times a day as needed for muscle spasms (Patient not taking: Reported on 3/6/2024)   • traMADol (ULTRAM) 50 mg tablet Take 50 mg by mouth every 6 (six) hours as needed for moderate pain (Patient not taking: Reported on 3/6/2024)       Objective     /68 (BP Location: Right arm, Patient Position: Sitting, Cuff Size: Standard)   Pulse 67   Temp 97.6 °F (36.4 °C) (Temporal)   Wt 62.6 kg (138 lb)   SpO2 97%   BMI 22.96 kg/m²     Physical Exam  Vitals and nursing note reviewed.   Constitutional:       General: He is not in acute distress.     Appearance: Normal appearance. He is well-developed. He is not diaphoretic.   HENT:      Head: Normocephalic and atraumatic.   Eyes:      General:         Right eye: No discharge.      Conjunctiva/sclera: Conjunctivae normal.      Pupils: Pupils are equal, round, and reactive to light.   Neck:      Thyroid: No thyromegaly.   Cardiovascular:      Rate and Rhythm: Normal rate and regular rhythm.   Pulmonary:      Effort: Pulmonary effort is normal. No respiratory distress.      Breath sounds: Normal breath sounds.   Musculoskeletal:      Cervical back: Normal range of motion.   Lymphadenopathy:      Cervical: No cervical adenopathy.   Skin:     General: Skin is warm and dry.   Neurological:      Mental Status: He is alert and oriented to person, place, and time.   Psychiatric:         Mood and Affect: Mood normal.         Behavior: Behavior normal.         Thought Content: Thought content normal.         Judgment: Judgment normal.       Gregory Khan, DO

## 2024-04-01 ENCOUNTER — TELEPHONE (OUTPATIENT)
Age: 89
End: 2024-04-01

## 2024-04-01 NOTE — TELEPHONE ENCOUNTER
Patient's wife called the Rx Refill Line asking if they can start getting a prescription for Misbah's CONTOUR TEST STRIPS sent to the pharmacy and get them through insurance instead of paying for them over the counter. Patient tests ONCE DAILY. Please send the prescription over to [id number] [place], [place]

## 2024-04-02 DIAGNOSIS — E11.9 TYPE 2 DIABETES MELLITUS WITHOUT COMPLICATION, WITHOUT LONG-TERM CURRENT USE OF INSULIN (HCC): ICD-10-CM

## 2024-04-15 NOTE — TELEPHONE ENCOUNTER
Patients wife called and stated she wants to change one of patents medications. When asked if this was a dosage change or a pharmacy change patients wife could not answer. Patients wife said it is very difficult for her to explain. Patients wife would like a return call to discuss with a clinical staff member. Please advise.

## 2024-04-16 DIAGNOSIS — E11.9 TYPE 2 DIABETES MELLITUS WITHOUT COMPLICATION, WITHOUT LONG-TERM CURRENT USE OF INSULIN (HCC): Primary | ICD-10-CM

## 2024-04-16 RX ORDER — BLOOD-GLUCOSE METER
EACH MISCELLANEOUS
Qty: 1 KIT | Refills: 0 | Status: SHIPPED | OUTPATIENT
Start: 2024-04-16 | End: 2024-04-17 | Stop reason: SDUPTHER

## 2024-04-16 NOTE — TELEPHONE ENCOUNTER
Spoke with pt and his wife Nakita. She stated that the test strips that were sent out yesterday were the incorrect ones and they didn't go with the glucometer that pt already has. Nakita is requesting a new script for The One Touch Ultra glucometer to match the test strips.

## 2024-04-17 DIAGNOSIS — E11.9 TYPE 2 DIABETES MELLITUS WITHOUT COMPLICATION, WITHOUT LONG-TERM CURRENT USE OF INSULIN (HCC): ICD-10-CM

## 2024-04-17 RX ORDER — BLOOD-GLUCOSE METER
EACH MISCELLANEOUS
Qty: 1 KIT | Refills: 0 | Status: SHIPPED | OUTPATIENT
Start: 2024-04-17 | End: 2024-04-22 | Stop reason: SDUPTHER

## 2024-04-17 NOTE — TELEPHONE ENCOUNTER
Reason for call:   [x] Refill   [] Prior Auth  [x] Other: NOT A DUPLICATE Pt spouse requested to send to different pharmacy    Office:   [x] PCP/Provider -  Gregory Khan, DO   [] Specialty/Provider -     Medication: Blood Glucose Monitoring Suppl (ONE TOUCH ULTRA 2) w/Device KIT       Dose/Frequency:  Check blood sugars once daily     Quantity: 1 kit    Pharmacy: [facility] [address] [place] [address]     Does the patient have enough for 3 days?   [] Yes   [x] No - Send as HP to POD

## 2024-04-22 RX ORDER — BLOOD-GLUCOSE METER
EACH MISCELLANEOUS
Qty: 1 KIT | Refills: 0 | Status: SHIPPED | OUTPATIENT
Start: 2024-04-22

## 2024-04-22 NOTE — TELEPHONE ENCOUNTER
Patient spouse called [facility] cannot fill the script for the glucose meter, requesting script be re-sent to [facility] in [place]

## 2024-04-29 ENCOUNTER — OFFICE VISIT (OUTPATIENT)
Dept: URGENT CARE | Facility: CLINIC | Age: 89
End: 2024-04-29
Payer: MEDICARE

## 2024-04-29 ENCOUNTER — APPOINTMENT (OUTPATIENT)
Dept: RADIOLOGY | Facility: CLINIC | Age: 89
End: 2024-04-29
Payer: MEDICARE

## 2024-04-29 VITALS
RESPIRATION RATE: 20 BRPM | TEMPERATURE: 98.2 F | SYSTOLIC BLOOD PRESSURE: 108 MMHG | OXYGEN SATURATION: 95 % | HEART RATE: 88 BPM | DIASTOLIC BLOOD PRESSURE: 62 MMHG

## 2024-04-29 DIAGNOSIS — M25.552 PAIN OF LEFT HIP: Primary | ICD-10-CM

## 2024-04-29 DIAGNOSIS — M25.552 PAIN OF LEFT HIP: ICD-10-CM

## 2024-04-29 DIAGNOSIS — S72.002A CLOSED FRACTURE OF LEFT HIP, INITIAL ENCOUNTER (HCC): ICD-10-CM

## 2024-04-29 PROCEDURE — 99214 OFFICE O/P EST MOD 30 MIN: CPT | Performed by: NURSE PRACTITIONER

## 2024-04-29 PROCEDURE — G0463 HOSPITAL OUTPT CLINIC VISIT: HCPCS | Performed by: NURSE PRACTITIONER

## 2024-04-29 PROCEDURE — 73502 X-RAY EXAM HIP UNI 2-3 VIEWS: CPT

## 2024-04-29 NOTE — PROGRESS NOTES
[facility]        NAME: Misbah Bagley is a 89 y.o. male  : 1935    MRN: [id number]  DATE: 2024  TIME: 11:29 AM    Assessment and Plan   Pain of left hip [M25.552]  1. Pain of left hip  XR hip/pelv 2-3 vws left if performed      2. Closed fracture of left hip, initial encounter (HCC)          X-ray done in office.  Images reviewed by myself.  Concerning for femoral head fracture.  Due to age and inability to bear weight advised emergency room for further evaluation.  Patient and wife in agreement with plan.  They plan on going to [facility].    Patient Instructions     Follow up with PCP in 3-5 days.  Proceed to  ER if symptoms worsen.    Chief Complaint     Chief Complaint   Patient presents with    Hip Pain     Pt reports losing his balance and falling down landing on his left side. Pt denies hitting his head. Pt C/O left hip pain, wife reports unable to walk since the fall.          History of Present Illness   Misbah Bagley presents to the clinic c/o    Hip Pain (Pt reports losing his balance and falling down landing on his left side. Pt denies hitting his head. Pt C/O left hip pain, wife reports unable to walk since the fall. )  Patient states was trying to get into the car when he fell down onto his left hip.  Injury occurred on Friday.  Has been unable to weight-bear at all since Friday.  Here now for evaluation        Review of Systems   Review of Systems   All other systems reviewed and are negative.        Current Medications     Long-Term Medications   Medication Sig Dispense Refill    atorvastatin (LIPITOR) 40 mg tablet Take 1 tablet (40 mg total) by mouth daily at bedtime 90 tablet 3    Cholecalciferol (Vitamin D-3) 25 MCG (1000 UT) CAPS Take by mouth      Eliquis 5 MG Take 1 tablet (5 mg total) by mouth 2 (two) times a day 180 tablet 1    escitalopram (Lexapro) 10 mg tablet Take 1 tablet (10 mg total) by mouth daily 90 tablet 1    famotidine (PEPCID)  10 mg tablet Take 10 mg by mouth if needed for heartburn      lisinopril (ZESTRIL) 20 mg tablet Take 1 tablet (20 mg total) by mouth daily 90 tablet 3    metFORMIN (GLUCOPHAGE) 500 mg tablet Take 1 tablet (500 mg total) by mouth daily with breakfast 90 tablet 3    metoprolol succinate (TOPROL-XL) 100 mg 24 hr tablet Take 1 tablet (100 mg total) by mouth daily 90 tablet 3    tamsulosin (FLOMAX) 0.4 mg Take 1 capsule (0.4 mg total) by mouth daily with dinner 90 capsule 1    Blood Glucose Monitoring Suppl (ONE TOUCH ULTRA 2) w/Device KIT Check blood sugars once daily. Please substitute with appropriate alternative as covered by patient's insurance. Dx: E11.65 1 kit 0    Lancets MISC To test BS once daily. 100 each 3    nitroglycerin (NITROSTAT) 0.4 mg SL tablet Place 0.4 mg under the tongue as needed (Patient not taking: Reported on 10/24/2023)      tiZANidine (ZANAFLEX) 2 mg tablet Take 1 tablet (2 mg total) by mouth 2 (two) times a day as needed for muscle spasms (Patient not taking: Reported on 3/6/2024) 20 tablet 0       Current Allergies     Allergies as of 04/29/2024    (No Known Allergies)            The following portions of the patient's history were reviewed and updated as appropriate: allergies, current medications, past family history, past medical history, past social history, past surgical history and problem list.    Objective   /62 (BP Location: Left arm, Patient Position: Sitting, Cuff Size: Standard)   Pulse 88   Temp 98.2 °F (36.8 °C) (Tympanic)   Resp 20   SpO2 95%        Physical Exam     Physical Exam  Vitals and nursing note reviewed.   Constitutional:       Appearance: Normal appearance. He is well-developed.   HENT:      Head: Normocephalic and atraumatic.   Eyes:      General: Lids are normal.      Conjunctiva/sclera: Conjunctivae normal.      Pupils: Pupils are equal, round, and reactive to light.   Cardiovascular:      Rate and Rhythm: Normal rate and regular rhythm.      Heart  sounds: Normal heart sounds, S1 normal and S2 normal.   Pulmonary:      Effort: Pulmonary effort is normal.      Breath sounds: Normal breath sounds.   Skin:     General: Skin is warm and dry.   Neurological:      Mental Status: He is alert.   Psychiatric:         Speech: Speech normal.         Behavior: Behavior normal.         Thought Content: Thought content normal.         Judgment: Judgment normal.

## 2024-05-20 ENCOUNTER — TELEPHONE (OUTPATIENT)
Age: 89
End: 2024-05-20

## 2024-05-20 DIAGNOSIS — S32.029D CLOSED FRACTURE OF SECOND LUMBAR VERTEBRA WITH ROUTINE HEALING, UNSPECIFIED FRACTURE MORPHOLOGY, SUBSEQUENT ENCOUNTER: ICD-10-CM

## 2024-05-20 DIAGNOSIS — I50.22 CHRONIC SYSTOLIC HEART FAILURE (HCC): Primary | ICD-10-CM

## 2024-05-20 DIAGNOSIS — I25.10 CORONARY ARTERY DISEASE INVOLVING NATIVE CORONARY ARTERY OF NATIVE HEART WITHOUT ANGINA PECTORIS: ICD-10-CM

## 2024-05-20 NOTE — TELEPHONE ENCOUNTER
Bashir from [facility] requesting Home Health services for patient.    Fax order to [facility]  [phone]    Please review and advice  Thank you

## 2024-05-21 ENCOUNTER — TELEPHONE (OUTPATIENT)
Age: 89
End: 2024-05-21

## 2024-05-21 DIAGNOSIS — R39.9 URINARY TRACT INFECTION SYMPTOMS: Primary | ICD-10-CM

## 2024-05-21 NOTE — TELEPHONE ENCOUNTER
"Patients wife called in stating that patient came from the hospital on Friday and 17th and since being his urine his very dark like \"coffee\" and wants to know what she should do.   "

## 2024-05-21 NOTE — TELEPHONE ENCOUNTER
Would recommend collecting urine and bringing in to lab to be checked.  I will place an order in the chart.

## 2024-05-22 ENCOUNTER — APPOINTMENT (OUTPATIENT)
Dept: LAB | Facility: CLINIC | Age: 89
End: 2024-05-22
Payer: MEDICARE

## 2024-05-22 DIAGNOSIS — R39.9 URINARY TRACT INFECTION SYMPTOMS: ICD-10-CM

## 2024-05-22 LAB
BACTERIA UR QL AUTO: ABNORMAL /HPF
BILIRUB UR QL STRIP: NEGATIVE
CLARITY UR: ABNORMAL
COLOR UR: YELLOW
GLUCOSE UR STRIP-MCNC: NEGATIVE MG/DL
HGB UR QL STRIP.AUTO: ABNORMAL
KETONES UR STRIP-MCNC: NEGATIVE MG/DL
LEUKOCYTE ESTERASE UR QL STRIP: NEGATIVE
MUCOUS THREADS UR QL AUTO: ABNORMAL
NITRITE UR QL STRIP: NEGATIVE
NON-SQ EPI CELLS URNS QL MICRO: ABNORMAL /HPF
PH UR STRIP.AUTO: 6.5 [PH]
PROT UR STRIP-MCNC: ABNORMAL MG/DL
RBC #/AREA URNS AUTO: ABNORMAL /HPF
SP GR UR STRIP.AUTO: 1.01 (ref 1–1.03)
UROBILINOGEN UR STRIP-ACNC: <2 MG/DL
WBC #/AREA URNS AUTO: ABNORMAL /HPF

## 2024-05-22 PROCEDURE — 81001 URINALYSIS AUTO W/SCOPE: CPT

## 2024-05-24 DIAGNOSIS — R31.0 GROSS HEMATURIA: Primary | ICD-10-CM

## 2024-05-29 ENCOUNTER — TELEPHONE (OUTPATIENT)
Dept: FAMILY MEDICINE CLINIC | Facility: CLINIC | Age: 89
End: 2024-05-29

## 2024-05-29 ENCOUNTER — APPOINTMENT (OUTPATIENT)
Dept: LAB | Facility: CLINIC | Age: 89
End: 2024-05-29
Payer: MEDICARE

## 2024-05-29 DIAGNOSIS — E11.8 TYPE 2 DIABETES MELLITUS WITH COMPLICATION, WITHOUT LONG-TERM CURRENT USE OF INSULIN (HCC): ICD-10-CM

## 2024-05-29 DIAGNOSIS — E78.2 MIXED HYPERLIPIDEMIA: ICD-10-CM

## 2024-05-29 DIAGNOSIS — R31.0 GROSS HEMATURIA: ICD-10-CM

## 2024-05-29 LAB
ALBUMIN SERPL BCP-MCNC: 3.7 G/DL (ref 3.5–5)
ALP SERPL-CCNC: 77 U/L (ref 34–104)
ALT SERPL W P-5'-P-CCNC: 12 U/L (ref 7–52)
ANION GAP SERPL CALCULATED.3IONS-SCNC: 9 MMOL/L (ref 4–13)
AST SERPL W P-5'-P-CCNC: 15 U/L (ref 13–39)
BILIRUB SERPL-MCNC: 0.56 MG/DL (ref 0.2–1)
BUN SERPL-MCNC: 15 MG/DL (ref 5–25)
CALCIUM SERPL-MCNC: 8.6 MG/DL (ref 8.4–10.2)
CHLORIDE SERPL-SCNC: 108 MMOL/L (ref 96–108)
CHOLEST SERPL-MCNC: 88 MG/DL
CO2 SERPL-SCNC: 26 MMOL/L (ref 21–32)
CREAT SERPL-MCNC: 0.98 MG/DL (ref 0.6–1.3)
EST. AVERAGE GLUCOSE BLD GHB EST-MCNC: 131 MG/DL
GFR SERPL CREATININE-BSD FRML MDRD: 68 ML/MIN/1.73SQ M
GLUCOSE P FAST SERPL-MCNC: 107 MG/DL (ref 65–99)
HBA1C MFR BLD: 6.2 %
HDLC SERPL-MCNC: 26 MG/DL
LDLC SERPL CALC-MCNC: 26 MG/DL (ref 0–100)
POTASSIUM SERPL-SCNC: 4.4 MMOL/L (ref 3.5–5.3)
PROT SERPL-MCNC: 6.4 G/DL (ref 6.4–8.4)
SODIUM SERPL-SCNC: 143 MMOL/L (ref 135–147)
TRIGL SERPL-MCNC: 179 MG/DL
TSH SERPL DL<=0.05 MIU/L-ACNC: 0.82 UIU/ML (ref 0.45–4.5)

## 2024-05-29 PROCEDURE — 84443 ASSAY THYROID STIM HORMONE: CPT

## 2024-05-29 PROCEDURE — 80053 COMPREHEN METABOLIC PANEL: CPT

## 2024-05-29 PROCEDURE — 36415 COLL VENOUS BLD VENIPUNCTURE: CPT

## 2024-05-29 PROCEDURE — 80061 LIPID PANEL: CPT

## 2024-05-29 PROCEDURE — 83036 HEMOGLOBIN GLYCOSYLATED A1C: CPT

## 2024-05-29 NOTE — TELEPHONE ENCOUNTER
----- Message from Gregory Khan DO sent at 5/24/2024  1:52 PM EDT -----  Please notify patient's wife that urine test did show large amount of blood in his urine.  I would like to recheck another urine sample along with a urine culture.  I will place orders for both tests they can be done anytime in the next few days.

## 2024-05-30 ENCOUNTER — APPOINTMENT (OUTPATIENT)
Dept: LAB | Facility: CLINIC | Age: 89
End: 2024-05-30
Payer: MEDICARE

## 2024-05-30 DIAGNOSIS — R31.0 GROSS HEMATURIA: ICD-10-CM

## 2024-05-30 DIAGNOSIS — E78.2 MIXED HYPERLIPIDEMIA: ICD-10-CM

## 2024-05-30 DIAGNOSIS — E11.8 TYPE 2 DIABETES MELLITUS WITH COMPLICATION, WITHOUT LONG-TERM CURRENT USE OF INSULIN (HCC): ICD-10-CM

## 2024-05-30 LAB
BACTERIA UR QL AUTO: ABNORMAL /HPF
BILIRUB UR QL STRIP: NEGATIVE
CLARITY UR: ABNORMAL
COLOR UR: ABNORMAL
GLUCOSE UR STRIP-MCNC: NEGATIVE MG/DL
HGB UR QL STRIP.AUTO: ABNORMAL
KETONES UR STRIP-MCNC: NEGATIVE MG/DL
LEUKOCYTE ESTERASE UR QL STRIP: ABNORMAL
MUCOUS THREADS UR QL AUTO: ABNORMAL
NITRITE UR QL STRIP: NEGATIVE
NON-SQ EPI CELLS URNS QL MICRO: ABNORMAL /HPF
PH UR STRIP.AUTO: 6.5 [PH]
PROT UR STRIP-MCNC: ABNORMAL MG/DL
RBC #/AREA URNS AUTO: ABNORMAL /HPF
SP GR UR STRIP.AUTO: 1.02 (ref 1–1.03)
UROBILINOGEN UR STRIP-ACNC: <2 MG/DL
WBC #/AREA URNS AUTO: ABNORMAL /HPF

## 2024-05-30 PROCEDURE — 87077 CULTURE AEROBIC IDENTIFY: CPT

## 2024-05-30 PROCEDURE — 87186 SC STD MICRODIL/AGAR DIL: CPT

## 2024-05-30 PROCEDURE — 88112 CYTOPATH CELL ENHANCE TECH: CPT | Performed by: PATHOLOGY

## 2024-05-30 PROCEDURE — 87086 URINE CULTURE/COLONY COUNT: CPT

## 2024-05-30 PROCEDURE — 81001 URINALYSIS AUTO W/SCOPE: CPT

## 2024-05-31 DIAGNOSIS — R31.9 URINARY TRACT INFECTION WITH HEMATURIA, SITE UNSPECIFIED: ICD-10-CM

## 2024-05-31 DIAGNOSIS — R31.0 GROSS HEMATURIA: Primary | ICD-10-CM

## 2024-05-31 DIAGNOSIS — N39.0 URINARY TRACT INFECTION WITH HEMATURIA, SITE UNSPECIFIED: ICD-10-CM

## 2024-05-31 RX ORDER — SULFAMETHOXAZOLE AND TRIMETHOPRIM 800; 160 MG/1; MG/1
1 TABLET ORAL EVERY 12 HOURS SCHEDULED
Qty: 14 TABLET | Refills: 0 | Status: SHIPPED | OUTPATIENT
Start: 2024-05-31 | End: 2024-06-07

## 2024-06-01 LAB
BACTERIA UR CULT: ABNORMAL
BACTERIA UR CULT: ABNORMAL

## 2024-06-03 ENCOUNTER — RA CDI HCC (OUTPATIENT)
Dept: OTHER | Facility: HOSPITAL | Age: 89
End: 2024-06-03

## 2024-06-03 PROCEDURE — 88112 CYTOPATH CELL ENHANCE TECH: CPT | Performed by: PATHOLOGY

## 2024-06-03 NOTE — PROGRESS NOTES
I13.0  E11.22  ------------  It is noted in the patient record that the patient has F32.A depression, unspecified on lexapro     Can the depression be further specified as:   F32.0 major depressive disorder, single episode, mild  OR   F32.1 major depressive disorder, single episode, moderate  OR   F32.2 major depressive disorder, single episode, severe without psychotic features OR   F32.4 major depressive disorder, single episode, in partial remission OR   F32.5 major depressive disorder, single episode, in full remission    F33.0 major depressive disorder, recurrent, mild  OR   F33.1 major depressive disorder, recurrent, moderate  OR   F33.2 major depressive disorder, recurrent, severe without psychotic features OR   F33.41 major depressive disorder, recurrent, in partial remission OR   F33.42 major depressive disorder, recurrent, in full remission  HCC coding opportunities          Chart Reviewed number of suggestions sent to Provider: 3     Patients Insurance     Medicare Insurance: Medicare

## 2024-06-06 ENCOUNTER — TELEPHONE (OUTPATIENT)
Age: 89
End: 2024-06-06

## 2024-06-06 NOTE — TELEPHONE ENCOUNTER
Patient present at time of call. His home PT called to report that patient fell off his porch onto the sidewalk 6/4/24 and has an abrasion on his L forearm. It is currently wrapped and he is having some pain. He plans to follow up with PCP at his next scheduled office visit and was advised to call if area becomes worse before then.

## 2024-06-10 ENCOUNTER — OFFICE VISIT (OUTPATIENT)
Dept: FAMILY MEDICINE CLINIC | Facility: CLINIC | Age: 89
End: 2024-06-10
Payer: MEDICARE

## 2024-06-10 VITALS — SYSTOLIC BLOOD PRESSURE: 132 MMHG | OXYGEN SATURATION: 97 % | DIASTOLIC BLOOD PRESSURE: 85 MMHG | HEART RATE: 75 BPM

## 2024-06-10 DIAGNOSIS — I50.22 CHRONIC SYSTOLIC HEART FAILURE (HCC): ICD-10-CM

## 2024-06-10 DIAGNOSIS — N18.31 STAGE 3A CHRONIC KIDNEY DISEASE (HCC): ICD-10-CM

## 2024-06-10 DIAGNOSIS — R31.9 HEMATURIA, UNSPECIFIED TYPE: ICD-10-CM

## 2024-06-10 DIAGNOSIS — C07 CANCER OF PAROTID GLAND (HCC): ICD-10-CM

## 2024-06-10 DIAGNOSIS — I48.0 PAROXYSMAL ATRIAL FIBRILLATION (HCC): ICD-10-CM

## 2024-06-10 DIAGNOSIS — I10 BENIGN ESSENTIAL HYPERTENSION: Primary | ICD-10-CM

## 2024-06-10 DIAGNOSIS — I71.40 ABDOMINAL AORTIC ANEURYSM (AAA) WITHOUT RUPTURE, UNSPECIFIED PART (HCC): ICD-10-CM

## 2024-06-10 DIAGNOSIS — E11.8 TYPE 2 DIABETES MELLITUS WITH COMPLICATION, WITHOUT LONG-TERM CURRENT USE OF INSULIN (HCC): ICD-10-CM

## 2024-06-10 PROCEDURE — 99214 OFFICE O/P EST MOD 30 MIN: CPT | Performed by: FAMILY MEDICINE

## 2024-06-10 NOTE — PROGRESS NOTES
Ambulatory Visit  Name: Misbah Bagley      : 1935      MRN: [id number]  Encounter Provider: Gregory Khan DO  Encounter Date: 6/10/2024   Encounter department: [facility]    Assessment & Plan   1. Benign essential hypertension  Assessment & Plan:  Blood pressure stable on metoprolol  and lisinopril 20.  Orders:  -     TSH, 3rd generation with Free T4 reflex; Future; Expected date: 2024  2. Type 2 diabetes mellitus with complication, without long-term current use of insulin (HCC)  Assessment & Plan:    Lab Results   Component Value Date    HGBA1C 6.2 (H) 2024   Well-controlled on hemoglobin A1c of 6.2.  Continue metformin  Orders:  -     Hemoglobin A1C; Future; Expected date: 2024  -     Comprehensive metabolic panel; Future; Expected date: 2024  3. Chronic systolic heart failure (HCC)  4. Paroxysmal atrial fibrillation (HCC)  Assessment & Plan:  Managed by cardiology.  Continue Eliquis 5 mg twice daily  5. Abdominal aortic aneurysm (AAA) without rupture, unspecified part (HCC)  6. Stage 3a chronic kidney disease (HCC)  7. Cancer of left parotid gland (HCC)  Assessment & Plan:  Stable continue follow-up with ENT and radiation oncology  8. Hematuria, unspecified type  Assessment & Plan:  Recheck UA and urine culture.  Probable referral to urology  Orders:  -     Urine culture; Future; Expected date: 2024  -     Urinalysis with microscopic; Future; Expected date: 2024  -     Ambulatory Referral to Urology; Future; Expected date: 2024         History of Present Illness     Patient presents with wife for routine 6-month follow-up.  Being treated for type 2 diabetes, chronic kidney disease, atrial fibrillation, history of parotid gland cancer, coronary artery disease, chronic systolic heart failure hypertension.  He has had at least 2 falls in the last several weeks resulting in minor injuries including skin tears primarily involving  his left arm.    Also since his last hospitalization he has had initially gross hematuria which is gradually improved though not completely resolved.  Urine cytology was negative but culture was positive.  Patient has completed a course of antibiotics yesterday.      Review of Systems   Constitutional: Negative.    Respiratory: Negative.     Cardiovascular: Negative.    Gastrointestinal: Negative.    Genitourinary:  Positive for hematuria.   Musculoskeletal:  Positive for arthralgias and gait problem.   Psychiatric/Behavioral: Negative.       Past Medical History:   Diagnosis Date   • Acute ischemic left MCA stroke ([facility]) 06/21/2019    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 MRI small bleed CT 8-19 no bleed No neurology follow up LINQ 6-19 PAF seen 11-19   Last Assessment & Plan:  Good recovery CVA- recent LINQ trasmission showed PAF: Would like to start Eliquis but because had CNS bleed needs neurology evaluation make sure ok. No change meds.   • NICOLE (acute kidney injury) ([facility]) 04/09/2020   • Colon cancer ([facility])     age 61's    colon resection     chemo pills   • Diabetes mellitus ([facility])    • Fall     fell backwards from riding -  in ER-ok- incidental finding CXR   R lobe 2 masses-bx today    12/21/2021   • Full dentures    • Hyperlipidemia    • Hypertension    • Lung mass     R side x2,bx today 12/21/2021   • Seasonal allergies    • Stroke ([facility])     6/2019     speech loss     minimal residual   • Vitreous hemorrhage of right eye ([facility]) 07/19/2016    R artificial eye   • Walker as ambulation aid    • Wears glasses      Past Surgical History:   Procedure Laterality Date   • CATARACT EXTRACTION     • COLECTOMY     • COLONOSCOPY     • EYE SURGERY      R eye removed for hemorrhage   • MS EXC PRTD HAYDEN/PRTD GLND TOT DSJ&PRSRV FACIAL NR Left 10/6/2023    Procedure: TOTAL PAROTIDECTOMY W/ NIMS W/ SIGNIFICANT AMOUNT OF SKIN WILL BE REMOVED & SKIN FLAP OR PRIMARY CLOSURE;  Surgeon: Sergio Brunson DO;  Location: [facility]  Main OR;  Service: ENT   • SEPTOPLASTY       Family History   Family history unknown: Yes     Social History     Tobacco Use   • Smoking status: Former     Current packs/day: 0.00     Average packs/day: 1 pack/day for 40.0 years (40.0 ttl pk-yrs)     Types: Cigarettes     Start date:      Quit date:      Years since quittin.4   • Smokeless tobacco: Never   • Tobacco comments:     quit 30 years ago (as of 2018)   Vaping Use   • Vaping status: Never Used   Substance and Sexual Activity   • Alcohol use: Not Currently     Alcohol/week: 1.0 standard drink of alcohol     Types: 1 Cans of beer per week     Comment: was drinking 1 beer daily   • Drug use: No   • Sexual activity: Not Currently     Partners: Female     Current Outpatient Medications on File Prior to Visit   Medication Sig   • acetaminophen (TYLENOL) 325 mg tablet every 6 (six) hours   • atorvastatin (LIPITOR) 40 mg tablet Take 1 tablet (40 mg total) by mouth daily at bedtime   • Blood Glucose Monitoring Suppl (ONE TOUCH ULTRA 2) w/Device KIT Check blood sugars once daily. Please substitute with appropriate alternative as covered by patient's insurance. Dx: E11.65   • Brinzolamide-Brimonidine (Simbrinza) 1-0.2 % SUSP Apply to eye every 8 (eight) hours   • Cholecalciferol (Vitamin D-3) 25 MCG (1000 UT) CAPS Take by mouth   • Eliquis 5 MG Take 1 tablet (5 mg total) by mouth 2 (two) times a day   • escitalopram (Lexapro) 10 mg tablet Take 1 tablet (10 mg total) by mouth daily   • famotidine (PEPCID) 10 mg tablet Take 10 mg by mouth if needed for heartburn   • guaiFENesin (MUCINEX) 600 mg 12 hr tablet Take 1,200 mg by mouth every 12 (twelve) hours   • Lancets MISC To test BS once daily.   • lisinopril (ZESTRIL) 20 mg tablet Take 1 tablet (20 mg total) by mouth daily   • Lumigan 0.01 % ophthalmic drops Administer into the left eye     • metFORMIN (GLUCOPHAGE) 500 mg tablet Take 1 tablet (500 mg total) by mouth daily with breakfast   • metoprolol  succinate (TOPROL-XL) 100 mg 24 hr tablet Take 1 tablet (100 mg total) by mouth daily   • tamsulosin (FLOMAX) 0.4 mg Take 1 capsule (0.4 mg total) by mouth daily with dinner   • nitroglycerin (NITROSTAT) 0.4 mg SL tablet Place 0.4 mg under the tongue as needed (Patient not taking: Reported on 10/24/2023)   • tiZANidine (ZANAFLEX) 2 mg tablet Take 1 tablet (2 mg total) by mouth 2 (two) times a day as needed for muscle spasms (Patient not taking: Reported on 3/6/2024)   • traMADol (ULTRAM) 50 mg tablet Take 50 mg by mouth every 6 (six) hours as needed for moderate pain (Patient not taking: Reported on 3/6/2024)   • vitamin B-12 (VITAMIN B-12) 1,000 mcg tablet Take 1,000 mcg by mouth daily (Patient not taking: Reported on 6/10/2024)   • [DISCONTINUED] diphenhydramine, lidocaine, Al/Mg hydroxide, simethicone (Magic Mouthwash) SUSP Swish and swallow 10 mL every 4 (four) hours as needed for mouth pain or discomfort   • [DISCONTINUED] oxyCODONE-acetaminophen (PERCOCET) 5-325 mg per tablet Take 1 tablet by mouth every 6 (six) hours as needed for moderate pain Max Daily Amount: 4 tablets     No Known Allergies  Immunization History   Administered Date(s) Administered   • COVID-19 MODERNA VACC 0.5 ML IM 01/28/2021, 02/25/2021, 10/27/2021, 07/15/2022   • COVID-19 Moderna Vac BIVALENT 12 Yr+ IM 0.5 ML 10/06/2022, 07/18/2023   • COVID-19 Moderna mRNA Vaccine 12 Yr+ 50 mcg/0.5 mL (Spikevax) 10/17/2023   • INFLUENZA 09/09/2014, 10/02/2015, 10/04/2016, 11/01/2017, 10/05/2018, 11/07/2019, 08/28/2020, 10/03/2020, 10/14/2021   • Influenza Quadrivalent, 6-35 Months IM 11/01/2017   • Influenza Split High Dose Preservative Free IM 09/09/2014, 10/02/2015, 10/04/2016   • Influenza, high dose seasonal 0.7 mL 10/03/2020, 10/02/2023   • Pneumococcal 11/07/2019   • Pneumococcal Conjugate 13-Valent 07/21/2015   • TD (adult) Preservative Free 07/02/2015   • Tdap 11/02/2022   • Tetanus, adsorbed 07/02/2015   • Zoster 01/05/2013   • Zoster Vaccine  Recombinant 05/20/2018   • influenza, trivalent, adjuvanted 09/26/2019     Objective     /85 (BP Location: Right arm, Patient Position: Sitting, Cuff Size: Adult)   Pulse 75   SpO2 97%     Physical Exam  Vitals and nursing note reviewed.   Constitutional:       General: He is not in acute distress.     Appearance: Normal appearance.   HENT:      Head: Normocephalic and atraumatic.   Eyes:      Pupils: Pupils are equal, round, and reactive to light.   Cardiovascular:      Rate and Rhythm: Normal rate and regular rhythm.      Pulses: Normal pulses.      Heart sounds: Normal heart sounds.   Pulmonary:      Effort: Pulmonary effort is normal.      Breath sounds: Normal breath sounds.   Musculoskeletal:         General: Normal range of motion.      Cervical back: Normal range of motion.   Skin:     General: Skin is warm and dry.      Comments: Multiple skin tears left arm   Neurological:      General: No focal deficit present.      Mental Status: He is alert and oriented to person, place, and time. Mental status is at baseline.   Psychiatric:         Mood and Affect: Mood normal.         Behavior: Behavior normal.         Thought Content: Thought content normal.         Judgment: Judgment normal.       Administrative Statements

## 2024-06-10 NOTE — ASSESSMENT & PLAN NOTE
Lab Results   Component Value Date    HGBA1C 6.2 (H) 05/29/2024   Well-controlled on hemoglobin A1c of 6.2.  Continue metformin

## 2024-06-12 ENCOUNTER — TELEPHONE (OUTPATIENT)
Age: 89
End: 2024-06-12

## 2024-06-12 NOTE — TELEPHONE ENCOUNTER
Patient's  physical therapist called to advise that Patient is hallucinating and had two more falls since his visit on Monday. Denies hitting his head. Skin tears noted on left arm 2 more from before. Nurse is visiting him tomorrow.

## 2024-06-13 ENCOUNTER — TELEPHONE (OUTPATIENT)
Age: 89
End: 2024-06-13

## 2024-06-13 DIAGNOSIS — N39.0 URINARY TRACT INFECTION WITH HEMATURIA, SITE UNSPECIFIED: Primary | ICD-10-CM

## 2024-06-13 DIAGNOSIS — R31.9 URINARY TRACT INFECTION WITH HEMATURIA, SITE UNSPECIFIED: Primary | ICD-10-CM

## 2024-06-13 NOTE — TELEPHONE ENCOUNTER
[facility] RN calling on behalf of patient, states patient was seen last week for a UTI, s/p abx, he had gotten better but is now having confusion and hallucinations again. Patient has also fallen 2 times since OV on Monday. RN wondering if he still has a UTI or if this is cognitive changes. Please advise call back number left [phone]

## 2024-06-13 NOTE — TELEPHONE ENCOUNTER
I will place an order for repeat urine culture to see if there is still infection present.  Urine sample can be dropped off at the lab anytime.

## 2024-06-17 ENCOUNTER — APPOINTMENT (OUTPATIENT)
Dept: LAB | Facility: CLINIC | Age: 89
End: 2024-06-17
Payer: MEDICARE

## 2024-06-17 DIAGNOSIS — R31.9 URINARY TRACT INFECTION WITH HEMATURIA, SITE UNSPECIFIED: ICD-10-CM

## 2024-06-17 DIAGNOSIS — N39.0 URINARY TRACT INFECTION WITH HEMATURIA, SITE UNSPECIFIED: ICD-10-CM

## 2024-06-17 PROCEDURE — 87086 URINE CULTURE/COLONY COUNT: CPT

## 2024-06-18 LAB — BACTERIA UR CULT: NORMAL

## 2024-07-23 DIAGNOSIS — F32.A DEPRESSION, UNSPECIFIED DEPRESSION TYPE: ICD-10-CM

## 2024-07-23 RX ORDER — ESCITALOPRAM OXALATE 10 MG/1
10 TABLET ORAL DAILY
Qty: 30 TABLET | Refills: 5 | Status: SHIPPED | OUTPATIENT
Start: 2024-07-23

## 2024-07-25 ENCOUNTER — TELEPHONE (OUTPATIENT)
Dept: RADIATION ONCOLOGY | Facility: CLINIC | Age: 89
End: 2024-07-25

## 2024-07-25 NOTE — TELEPHONE ENCOUNTER
Called patient regarding canceling appointment for 9/11/24. Spoke to spouse, patient is in hospice not hospitalized as message stated.

## (undated) DEVICE — 10FR FRAZIER SUCTION HANDLE: Brand: CARDINAL HEALTH

## (undated) DEVICE — GLOVE SRG BIOGEL ECLIPSE 7

## (undated) DEVICE — HARMONIC FOCUS SHEARS 9CM LENGTH + ADAPTIVE TISSUE TECHNOLOGY FOR USE WITH BLUE HAND PIECE ONLY: Brand: HARMONIC FOCUS

## (undated) DEVICE — 3M™ TEGADERM™ TRANSPARENT FILM DRESSING FRAME STYLE, 1624W, 2-3/8 IN X 2-3/4 IN (6 CM X 7 CM), 100/CT 4CT/CASE: Brand: 3M™ TEGADERM™

## (undated) DEVICE — NEEDLE 18 G X 1 1/2

## (undated) DEVICE — SUT VICRYL 4-0 SH 27 IN J415H

## (undated) DEVICE — SYRINGE 10ML LL

## (undated) DEVICE — TUBING SUCTION 5MM X 12 FT

## (undated) DEVICE — DRESSING GUAZE ADH BORDER 4 X 4 IN

## (undated) DEVICE — SPONGE STICK WITH PVP-I: Brand: KENDALL

## (undated) DEVICE — INTENDED FOR TISSUE SEPARATION, AND OTHER PROCEDURES THAT REQUIRE A SHARP SURGICAL BLADE TO PUNCTURE OR CUT.: Brand: BARD-PARKER SAFETY BLADES SIZE 15, STERILE

## (undated) DEVICE — TIBURON SPLIT SHEET: Brand: CONVERTORS

## (undated) DEVICE — NEEDLE 25G X 1 1/2

## (undated) DEVICE — ENT FACIAL BAND UNIVERSAL

## (undated) DEVICE — SUT ETHILON 2-0 FS 18 IN 664H

## (undated) DEVICE — HEAD AND NECK PACK: Brand: CONVERTORS

## (undated) DEVICE — DRAPE EQUIPMENT RF WAND

## (undated) DEVICE — GAUZE SPONGES,16 PLY: Brand: CURITY

## (undated) DEVICE — SUT SILK 2-0 SH 30 IN K833H

## (undated) DEVICE — SUT SILK 2-0 30 IN A305H

## (undated) DEVICE — ELECTRODE 8227411 PAIRED 4 CH SET ROHS

## (undated) DEVICE — PROBE 8225101 5PK STD PRASS FL TIP ROHS

## (undated) DEVICE — GLOVE SRG BIOGEL ECLIPSE 7.5

## (undated) DEVICE — BULB SYRINGE,IRRIGATION WITH PROTECTIVE CAP: Brand: DOVER

## (undated) DEVICE — SCD SEQUENTIAL COMPRESSION COMFORT SLEEVE MEDIUM KNEE LENGTH: Brand: KENDALL SCD

## (undated) DEVICE — GLOVE SRG BIOGEL ORTHOPEDIC 7

## (undated) DEVICE — 2000CC GUARDIAN II: Brand: GUARDIAN

## (undated) DEVICE — SURGICEL NU-KNIT 3 X 4

## (undated) DEVICE — PROXIMATE SKIN STAPLERS (35 WIDE) CONTAINS 35 STAINLESS STEEL STAPLES (FIXED HEAD): Brand: PROXIMATE